# Patient Record
Sex: FEMALE | Race: WHITE | Employment: FULL TIME | URBAN - METROPOLITAN AREA
[De-identification: names, ages, dates, MRNs, and addresses within clinical notes are randomized per-mention and may not be internally consistent; named-entity substitution may affect disease eponyms.]

---

## 2017-08-30 ENCOUNTER — TELEPHONE (OUTPATIENT)
Dept: INTERNAL MEDICINE CLINIC | Facility: CLINIC | Age: 47
End: 2017-08-30

## 2017-08-31 ENCOUNTER — TELEPHONE (OUTPATIENT)
Dept: INTERNAL MEDICINE CLINIC | Facility: CLINIC | Age: 47
End: 2017-08-31

## 2017-08-31 DIAGNOSIS — Z13.220 SCREENING FOR LIPID DISORDERS: ICD-10-CM

## 2017-08-31 DIAGNOSIS — Z13.29 SCREENING FOR THYROID DISORDER: ICD-10-CM

## 2017-08-31 DIAGNOSIS — Z00.00 ROUTINE GENERAL MEDICAL EXAMINATION AT A HEALTH CARE FACILITY: Primary | ICD-10-CM

## 2017-08-31 DIAGNOSIS — Z12.31 ENCOUNTER FOR SCREENING MAMMOGRAM FOR MALIGNANT NEOPLASM OF BREAST: ICD-10-CM

## 2017-08-31 DIAGNOSIS — Z13.228 SCREENING FOR METABOLIC DISORDER: ICD-10-CM

## 2017-08-31 DIAGNOSIS — Z13.0 SCREENING FOR DISORDER OF BLOOD AND BLOOD-FORMING ORGANS: ICD-10-CM

## 2017-08-31 NOTE — TELEPHONE ENCOUNTER
CPE 9/25 please send all orders to [facility] . Patient is aware to fast no cb required.

## 2017-09-05 NOTE — TELEPHONE ENCOUNTER
Future Appointments  Date Time Provider Leora Hansen   9/25/2017 7:30 AM KOFI Steele [facility]

## 2017-09-19 ENCOUNTER — LAB ENCOUNTER (OUTPATIENT)
Dept: LAB | Facility: HOSPITAL | Age: 47
End: 2017-09-19
Attending: NURSE PRACTITIONER
Payer: COMMERCIAL

## 2017-09-19 DIAGNOSIS — Z00.00 ROUTINE GENERAL MEDICAL EXAMINATION AT A HEALTH CARE FACILITY: ICD-10-CM

## 2017-09-19 DIAGNOSIS — Z13.228 SCREENING FOR METABOLIC DISORDER: ICD-10-CM

## 2017-09-19 DIAGNOSIS — Z13.220 SCREENING FOR LIPID DISORDERS: ICD-10-CM

## 2017-09-19 DIAGNOSIS — Z13.29 SCREENING FOR THYROID DISORDER: ICD-10-CM

## 2017-09-19 DIAGNOSIS — Z13.0 SCREENING FOR DISORDER OF BLOOD AND BLOOD-FORMING ORGANS: ICD-10-CM

## 2017-09-19 LAB
ALBUMIN SERPL-MCNC: 3.8 G/DL (ref 3.5–4.8)
ALP LIVER SERPL-CCNC: 90 U/L (ref 39–100)
ALT SERPL-CCNC: 34 U/L (ref 14–54)
AST SERPL-CCNC: 17 U/L (ref 15–41)
BASOPHILS # BLD AUTO: 0.05 X10(3) UL (ref 0–0.1)
BASOPHILS NFR BLD AUTO: 0.7 %
BILIRUB SERPL-MCNC: 0.5 MG/DL (ref 0.1–2)
BUN BLD-MCNC: 15 MG/DL (ref 8–20)
CALCIUM BLD-MCNC: 9.3 MG/DL (ref 8.3–10.3)
CHLORIDE: 105 MMOL/L (ref 101–111)
CHOLEST SMN-MCNC: 186 MG/DL (ref ?–200)
CO2: 28 MMOL/L (ref 22–32)
CREAT BLD-MCNC: 0.71 MG/DL (ref 0.55–1.02)
EOSINOPHIL # BLD AUTO: 0.45 X10(3) UL (ref 0–0.3)
EOSINOPHIL NFR BLD AUTO: 6.3 %
ERYTHROCYTE [DISTWIDTH] IN BLOOD BY AUTOMATED COUNT: 11.5 % (ref 11.5–16)
GLUCOSE BLD-MCNC: 88 MG/DL (ref 70–99)
HCT VFR BLD AUTO: 40.4 % (ref 34–50)
HDLC SERPL-MCNC: 88 MG/DL (ref 45–?)
HDLC SERPL: 2.11 {RATIO} (ref ?–4.44)
HGB BLD-MCNC: 13.8 G/DL (ref 12–16)
IMMATURE GRANULOCYTE COUNT: 0.02 X10(3) UL (ref 0–1)
IMMATURE GRANULOCYTE RATIO %: 0.3 %
LDLC SERPL CALC-MCNC: 84 MG/DL (ref ?–130)
LDLC SERPL-MCNC: 14 MG/DL (ref 5–40)
LYMPHOCYTES # BLD AUTO: 2.05 X10(3) UL (ref 0.9–4)
LYMPHOCYTES NFR BLD AUTO: 28.6 %
M PROTEIN MFR SERPL ELPH: 7.8 G/DL (ref 6.1–8.3)
MCH RBC QN AUTO: 32.3 PG (ref 27–33.2)
MCHC RBC AUTO-ENTMCNC: 34.2 G/DL (ref 31–37)
MCV RBC AUTO: 94.6 FL (ref 81–100)
MONOCYTES # BLD AUTO: 0.42 X10(3) UL (ref 0.1–0.6)
MONOCYTES NFR BLD AUTO: 5.8 %
NEUTROPHIL ABS PRELIM: 4.19 X10 (3) UL (ref 1.3–6.7)
NEUTROPHILS # BLD AUTO: 4.19 X10(3) UL (ref 1.3–6.7)
NEUTROPHILS NFR BLD AUTO: 58.3 %
NONHDLC SERPL-MCNC: 98 MG/DL (ref ?–130)
PLATELET # BLD AUTO: 275 10(3)UL (ref 150–450)
POTASSIUM SERPL-SCNC: 4.2 MMOL/L (ref 3.6–5.1)
RBC # BLD AUTO: 4.27 X10(6)UL (ref 3.8–5.1)
RED CELL DISTRIBUTION WIDTH-SD: 39.1 FL (ref 35.1–46.3)
SODIUM SERPL-SCNC: 140 MMOL/L (ref 136–144)
TRIGLYCERIDES: 70 MG/DL (ref ?–150)
TSI SER-ACNC: 2.39 MIU/ML (ref 0.35–5.5)
WBC # BLD AUTO: 7.2 X10(3) UL (ref 4–13)

## 2017-09-19 PROCEDURE — 84443 ASSAY THYROID STIM HORMONE: CPT

## 2017-09-19 PROCEDURE — 36415 COLL VENOUS BLD VENIPUNCTURE: CPT

## 2017-09-19 PROCEDURE — 85025 COMPLETE CBC W/AUTO DIFF WBC: CPT

## 2017-09-19 PROCEDURE — 80053 COMPREHEN METABOLIC PANEL: CPT

## 2017-09-19 PROCEDURE — 80061 LIPID PANEL: CPT

## 2017-09-25 ENCOUNTER — OFFICE VISIT (OUTPATIENT)
Dept: INTERNAL MEDICINE CLINIC | Facility: CLINIC | Age: 47
End: 2017-09-25

## 2017-09-25 VITALS
TEMPERATURE: 97 F | HEART RATE: 80 BPM | RESPIRATION RATE: 16 BRPM | SYSTOLIC BLOOD PRESSURE: 104 MMHG | DIASTOLIC BLOOD PRESSURE: 60 MMHG | BODY MASS INDEX: 28.93 KG/M2 | HEIGHT: 66 IN | WEIGHT: 180 LBS

## 2017-09-25 DIAGNOSIS — Z00.00 ROUTINE GENERAL MEDICAL EXAMINATION AT A HEALTH CARE FACILITY: Primary | ICD-10-CM

## 2017-09-25 PROCEDURE — 99396 PREV VISIT EST AGE 40-64: CPT | Performed by: NURSE PRACTITIONER

## 2017-09-25 NOTE — PROGRESS NOTES
Geovanna Thorne is a 52year old female who presents for a complete physical exam:       Patient complains of:  Doing well   No complaints.      Wt Readings from Last 6 Encounters:  05/19/16 : 193 lb  10/26/15 : 193 lb 7 oz  10/05/15 : 192 lb 8 oz  09/14/15 Maintenance  Immunizations: flu will be done at work    There is no immunization history on file for this patient.   Dental Visits: yes  Colonoscopy: due 2021  Pap: Dr Hagan      History of dysplasia:  No  Menstrual Cycle: regular         LMP: 8/20  [facility] visit.

## 2017-10-07 ENCOUNTER — HOSPITAL ENCOUNTER (OUTPATIENT)
Dept: MAMMOGRAPHY | Facility: HOSPITAL | Age: 47
Discharge: HOME | End: 2017-10-07
Attending: NURSE PRACTITIONER
Payer: COMMERCIAL

## 2017-10-07 DIAGNOSIS — Z12.31 ENCOUNTER FOR SCREENING MAMMOGRAM FOR MALIGNANT NEOPLASM OF BREAST: ICD-10-CM

## 2017-10-07 DIAGNOSIS — Z00.00 ROUTINE GENERAL MEDICAL EXAMINATION AT A HEALTH CARE FACILITY: ICD-10-CM

## 2017-10-07 PROCEDURE — 77067 SCR MAMMO BI INCL CAD: CPT | Performed by: NURSE PRACTITIONER

## 2018-01-08 ENCOUNTER — HOSPITAL ENCOUNTER (OUTPATIENT)
Dept: CT IMAGING | Facility: HOSPITAL | Age: 48
Discharge: HOME | End: 2018-01-08
Attending: INTERNAL MEDICINE

## 2018-01-08 DIAGNOSIS — Z13.6 SCREENING FOR HEART DISEASE: ICD-10-CM

## 2018-10-15 ENCOUNTER — TELEPHONE (OUTPATIENT)
Dept: INTERNAL MEDICINE CLINIC | Facility: CLINIC | Age: 48
End: 2018-10-15

## 2018-10-15 DIAGNOSIS — Z00.00 ROUTINE GENERAL MEDICAL EXAMINATION AT A HEALTH CARE FACILITY: Primary | ICD-10-CM

## 2018-10-15 NOTE — TELEPHONE ENCOUNTER
Blood work orders for cpe   Future Appointments   Date Time Provider Leora Hansen   12/17/2018  1:00 PM Isa Koenig MD [facility]     Lab is edward.  Pt aware to fast

## 2018-12-10 ENCOUNTER — TELEPHONE (OUTPATIENT)
Dept: INTERNAL MEDICINE CLINIC | Facility: CLINIC | Age: 48
End: 2018-12-10

## 2018-12-11 ENCOUNTER — LAB ENCOUNTER (OUTPATIENT)
Dept: LAB | Facility: HOSPITAL | Age: 48
End: 2018-12-11
Attending: INTERNAL MEDICINE
Payer: COMMERCIAL

## 2018-12-11 DIAGNOSIS — Z00.00 ROUTINE GENERAL MEDICAL EXAMINATION AT A HEALTH CARE FACILITY: ICD-10-CM

## 2018-12-11 PROCEDURE — 36415 COLL VENOUS BLD VENIPUNCTURE: CPT

## 2018-12-11 PROCEDURE — 80061 LIPID PANEL: CPT

## 2018-12-11 PROCEDURE — 80053 COMPREHEN METABOLIC PANEL: CPT

## 2018-12-11 PROCEDURE — 84443 ASSAY THYROID STIM HORMONE: CPT

## 2018-12-11 PROCEDURE — 85025 COMPLETE CBC W/AUTO DIFF WBC: CPT

## 2018-12-17 ENCOUNTER — LABORATORY ENCOUNTER (OUTPATIENT)
Dept: LAB | Age: 48
End: 2018-12-17
Attending: INTERNAL MEDICINE
Payer: COMMERCIAL

## 2018-12-17 ENCOUNTER — OFFICE VISIT (OUTPATIENT)
Dept: INTERNAL MEDICINE CLINIC | Facility: CLINIC | Age: 48
End: 2018-12-17
Payer: COMMERCIAL

## 2018-12-17 VITALS
DIASTOLIC BLOOD PRESSURE: 84 MMHG | RESPIRATION RATE: 16 BRPM | HEART RATE: 76 BPM | SYSTOLIC BLOOD PRESSURE: 130 MMHG | HEIGHT: 65.5 IN | WEIGHT: 199.38 LBS | TEMPERATURE: 98 F | BODY MASS INDEX: 32.82 KG/M2

## 2018-12-17 DIAGNOSIS — R74.8 ELEVATED ALKALINE PHOSPHATASE LEVEL: ICD-10-CM

## 2018-12-17 DIAGNOSIS — Z00.00 PE (PHYSICAL EXAM), ANNUAL: Primary | ICD-10-CM

## 2018-12-17 DIAGNOSIS — Z12.31 VISIT FOR SCREENING MAMMOGRAM: ICD-10-CM

## 2018-12-17 DIAGNOSIS — R10.11 RUQ ABDOMINAL PAIN: ICD-10-CM

## 2018-12-17 PROCEDURE — 84080 ASSAY ALKALINE PHOSPHATASES: CPT

## 2018-12-17 PROCEDURE — 84075 ASSAY ALKALINE PHOSPHATASE: CPT

## 2018-12-17 PROCEDURE — 99213 OFFICE O/P EST LOW 20 MIN: CPT | Performed by: INTERNAL MEDICINE

## 2018-12-17 PROCEDURE — 99396 PREV VISIT EST AGE 40-64: CPT | Performed by: INTERNAL MEDICINE

## 2018-12-17 NOTE — PROGRESS NOTES
Patient presents with:  Physical: cn room 9: physical , pressure on the same side of gallbaldder surgery, same Hpylori symptoms as before      HPI:  Here for cpe. Pt has ruq abd discommfort more at night, since her cholecystectomy.  Pt also notes gerd lik Smoking status: Never Smoker      Smokeless tobacco: Never Used    Alcohol use: No      Alcohol/week: 0.0 oz    Drug use: No        Current Outpatient Medications:  Multiple Vitamins-Minerals (MULTI-VITAMIN/MINERALS) Oral Tab Take 1 tablet by mouth daily. for elevated alk phos, likely fatty liver due to increased wt and chol. Labs reviewed. Advised low fat and low cholesterol diet. Exercise 3-4 times weekly.      Orders Placed This Encounter      Alk Phos Isoenzyme [E]      Meds & Refills for this Visit:

## 2018-12-23 NOTE — PROGRESS NOTES
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 Mom    REVIEW OF SYSTEMS  GENERAL: feels well otherwise  SKIN: denies any rashes to skin folds  HEENT: spouse reports snoring  LUNGS: denies shortness of breath with exertion, no apnea  CARDIOVASCULAR: denies chest pain on exertion, denies palpitations or 12/11/2018    BILT 0.5 12/11/2018    TP 7.3 12/11/2018    ALB 3.5 12/11/2018    GLOBULT 2.8 09/14/2015    GLOBULIN 3.8 12/11/2018    ALBGLOBRAT 1.5 09/14/2015     12/11/2018    K 4.1 12/11/2018     12/11/2018    CO2 28.0 12/11/2018     No resul contraindications: none  · Follow up with dietitian and psychologist as recommended. · Discussed the role of sleep and stress in weight management. · Labs orders as above.   · Counseled on comprehensive weight loss plan including attention to nutrition, e eating the right amount of calories or too much or too little which would hinder weight loss. Additionally this will help to see your daily carbohydrate intake.  When you set the neyda up chose 1-2 lbs/week as a goal.  Keeping a paper food journal is an optio

## 2018-12-26 ENCOUNTER — OFFICE VISIT (OUTPATIENT)
Dept: INTERNAL MEDICINE CLINIC | Facility: CLINIC | Age: 48
End: 2018-12-26
Payer: COMMERCIAL

## 2018-12-26 VITALS
RESPIRATION RATE: 16 BRPM | HEART RATE: 78 BPM | SYSTOLIC BLOOD PRESSURE: 110 MMHG | HEIGHT: 65.5 IN | BODY MASS INDEX: 33.42 KG/M2 | WEIGHT: 203 LBS | DIASTOLIC BLOOD PRESSURE: 70 MMHG

## 2018-12-26 DIAGNOSIS — E66.9 OBESITY (BMI 30.0-34.9): ICD-10-CM

## 2018-12-26 DIAGNOSIS — Z51.81 ENCOUNTER FOR THERAPEUTIC DRUG MONITORING: Primary | ICD-10-CM

## 2018-12-26 PROBLEM — E66.811 OBESITY (BMI 30.0-34.9): Status: ACTIVE | Noted: 2018-12-26

## 2018-12-26 PROCEDURE — 93000 ELECTROCARDIOGRAM COMPLETE: CPT | Performed by: NURSE PRACTITIONER

## 2018-12-26 PROCEDURE — 99214 OFFICE O/P EST MOD 30 MIN: CPT | Performed by: NURSE PRACTITIONER

## 2018-12-26 RX ORDER — PHENTERMINE HYDROCHLORIDE 37.5 MG/1
37.5 TABLET ORAL
Qty: 30 TABLET | Refills: 0 | Status: SHIPPED | OUTPATIENT
Start: 2018-12-26 | End: 2019-01-17

## 2018-12-26 RX ORDER — OMEPRAZOLE 20 MG/1
20 CAPSULE, DELAYED RELEASE ORAL
COMMUNITY
End: 2019-06-11

## 2018-12-26 NOTE — PATIENT INSTRUCTIONS
Welcome to the [facility]...your Lifestyle Renovation begins now! Thank you for placing your trust in our health care team, I look forward to working with you along this journey to better health!     Next steps:     1.  Sched begin with 1 day and progress as able with long-term goal of 30 minutes 5 days a week at a minimum. Meditation daily can help manage and control stress. Chronic stress can make weight loss difficult.   Exercising is one way to help with stress, but medit

## 2019-01-03 ENCOUNTER — LAB ENCOUNTER (OUTPATIENT)
Dept: LAB | Facility: HOSPITAL | Age: 49
End: 2019-01-03
Attending: NURSE PRACTITIONER
Payer: COMMERCIAL

## 2019-01-03 DIAGNOSIS — E66.9 OBESITY (BMI 30.0-34.9): ICD-10-CM

## 2019-01-03 DIAGNOSIS — Z51.81 ENCOUNTER FOR THERAPEUTIC DRUG MONITORING: ICD-10-CM

## 2019-01-03 LAB
EST. AVERAGE GLUCOSE BLD GHB EST-MCNC: 108 MG/DL (ref 68–126)
HAV AB SERPL IA-ACNC: 681 PG/ML (ref 193–986)
HBA1C MFR BLD HPLC: 5.4 % (ref ?–5.7)
VIT D+METAB SERPL-MCNC: 28.8 NG/ML (ref 30–100)

## 2019-01-03 PROCEDURE — 82306 VITAMIN D 25 HYDROXY: CPT

## 2019-01-03 PROCEDURE — 82397 CHEMILUMINESCENT ASSAY: CPT

## 2019-01-03 PROCEDURE — 36415 COLL VENOUS BLD VENIPUNCTURE: CPT

## 2019-01-03 PROCEDURE — 83036 HEMOGLOBIN GLYCOSYLATED A1C: CPT

## 2019-01-03 PROCEDURE — 82607 VITAMIN B-12: CPT

## 2019-01-07 ENCOUNTER — HOSPITAL ENCOUNTER (OUTPATIENT)
Dept: ULTRASOUND IMAGING | Facility: HOSPITAL | Age: 49
Discharge: HOME | End: 2019-01-07
Attending: INTERNAL MEDICINE
Payer: COMMERCIAL

## 2019-01-07 DIAGNOSIS — R10.11 RUQ ABDOMINAL PAIN: ICD-10-CM

## 2019-01-07 LAB — LEPTIN: 16.4 NG/ML

## 2019-01-07 PROCEDURE — 76700 US EXAM ABDOM COMPLETE: CPT | Performed by: INTERNAL MEDICINE

## 2019-01-17 ENCOUNTER — OFFICE VISIT (OUTPATIENT)
Dept: INTERNAL MEDICINE CLINIC | Facility: CLINIC | Age: 49
End: 2019-01-17
Payer: COMMERCIAL

## 2019-01-17 VITALS
HEART RATE: 100 BPM | DIASTOLIC BLOOD PRESSURE: 80 MMHG | SYSTOLIC BLOOD PRESSURE: 110 MMHG | BODY MASS INDEX: 31.93 KG/M2 | HEIGHT: 65.5 IN | WEIGHT: 194 LBS | RESPIRATION RATE: 14 BRPM

## 2019-01-17 DIAGNOSIS — E66.9 OBESITY (BMI 30.0-34.9): ICD-10-CM

## 2019-01-17 DIAGNOSIS — Z51.81 ENCOUNTER FOR THERAPEUTIC DRUG MONITORING: Primary | ICD-10-CM

## 2019-01-17 DIAGNOSIS — K59.03 DRUG INDUCED CONSTIPATION: ICD-10-CM

## 2019-01-17 DIAGNOSIS — E55.9 VITAMIN D DEFICIENCY: ICD-10-CM

## 2019-01-17 PROCEDURE — 99214 OFFICE O/P EST MOD 30 MIN: CPT | Performed by: NURSE PRACTITIONER

## 2019-01-17 RX ORDER — LACTOBACIL 2/BIFIDO 1/S.THERMO 450B CELL
1 PACKET (EA) ORAL DAILY
Qty: 60 CAPSULE | Refills: 3 | COMMUNITY
Start: 2019-01-17 | End: 2019-06-11

## 2019-01-17 RX ORDER — ERGOCALCIFEROL 1.25 MG/1
50000 CAPSULE ORAL WEEKLY
Qty: 12 CAPSULE | Refills: 1 | Status: SHIPPED | OUTPATIENT
Start: 2019-01-17 | End: 2019-11-19

## 2019-01-17 RX ORDER — PHENTERMINE HYDROCHLORIDE 37.5 MG/1
37.5 TABLET ORAL
Qty: 30 TABLET | Refills: 0 | Status: SHIPPED | OUTPATIENT
Start: 2019-01-17 | End: 2019-03-18

## 2019-01-17 NOTE — PATIENT INSTRUCTIONS
Congratulations on your 9# weight loss! Continue making lifestyle changes that focus on good nutrition, regular exercise and stress management.     Today we reviewed your labs with findings of: Vitamin D deficiency, mild leptin elevation    Medication Plan: Although daily physical activity, like walking, swimming and aerobics are essential to good heart health and weight management, combining muscle building weight training with cardiovascular exercise, gives you an unbeatable combination to lose fat and keep One of the best ways to help treat constipation is to increase your fiber intake. You can do this either through diet or by using fiber supplements. Fiber (in whole grains, fruits, and vegetables) adds bulk and absorbs water to soften the stool.  This helps

## 2019-01-17 NOTE — PROGRESS NOTES
Quinton Euceda is a 50year old female presents today for 1 month follow-up on medical weight loss program for the treatment of overweight, obesity, or morbid obesity with associated Vitamin D deficiency, mild leptin elevation.     S:  Current weight Wt Re PSYCH: pleasant, cooperative, normal mood and affect    ASSESSMENT AND PLAN:  Encounter for therapeutic drug monitoring  (primary encounter diagnosis)  Obesity (bmi 30.0-34. 9)  Vitamin d deficiency  Drug induced constipation    No orders of the defined typ Agreed upon goal/s before next office visit include: Romy Matthews work! Keep up the variety of fitness with cardio and strength.     Build Muscle & Lose Fat  The great fat vs muscle diagram below paints a clear picture of why it’s so important for you to build mus 2. Improve appearance. When done properly, strength training can greatly improve your posture and help to prevent joint pain. 3. Build confidence.  Strong muscles and joints increase your level of confidence in your abilities to perform many lifestyle acti · Nuts and legumes (especially beans such as lentils, kidney beans, and lima beans)  Get physically active  Exercise helps improve the working of your colon which helps ease constipation. Try to get some physical activity every day.  If you haven’t been act

## 2019-01-28 ENCOUNTER — HOSPITAL ENCOUNTER (OUTPATIENT)
Dept: MAMMOGRAPHY | Facility: HOSPITAL | Age: 49
Discharge: HOME | End: 2019-01-28
Attending: INTERNAL MEDICINE
Payer: COMMERCIAL

## 2019-01-28 DIAGNOSIS — Z12.31 VISIT FOR SCREENING MAMMOGRAM: ICD-10-CM

## 2019-01-28 PROCEDURE — 77063 BREAST TOMOSYNTHESIS BI: CPT | Performed by: INTERNAL MEDICINE

## 2019-01-28 PROCEDURE — 77067 SCR MAMMO BI INCL CAD: CPT | Performed by: INTERNAL MEDICINE

## 2019-03-18 ENCOUNTER — OFFICE VISIT (OUTPATIENT)
Dept: INTERNAL MEDICINE CLINIC | Facility: CLINIC | Age: 49
End: 2019-03-18
Payer: COMMERCIAL

## 2019-03-18 VITALS
SYSTOLIC BLOOD PRESSURE: 130 MMHG | DIASTOLIC BLOOD PRESSURE: 86 MMHG | WEIGHT: 188.19 LBS | BODY MASS INDEX: 30.98 KG/M2 | RESPIRATION RATE: 16 BRPM | HEART RATE: 88 BPM | HEIGHT: 65.5 IN

## 2019-03-18 DIAGNOSIS — Z51.81 ENCOUNTER FOR THERAPEUTIC DRUG MONITORING: Primary | ICD-10-CM

## 2019-03-18 DIAGNOSIS — E66.9 OBESITY (BMI 30.0-34.9): ICD-10-CM

## 2019-03-18 PROCEDURE — 99213 OFFICE O/P EST LOW 20 MIN: CPT | Performed by: NURSE PRACTITIONER

## 2019-03-18 RX ORDER — PHENTERMINE HYDROCHLORIDE 37.5 MG/1
37.5 TABLET ORAL DAILY
Qty: 30 TABLET | Refills: 0 | Status: SHIPPED | OUTPATIENT
Start: 2019-03-18 | End: 2019-05-16

## 2019-03-18 NOTE — PROGRESS NOTES
Valeria Valadez is a 50year old female presents today for 3 month follow-up on medical weight loss program for the treatment of overweight, obesity, or morbid obesity with associated Vitamin D deficiency, mild leptin elevation.     S:  Current weight Wt Re PLAN:  Encounter for therapeutic drug monitoring  (primary encounter diagnosis)  Obesity (bmi 30.0-34.9)    No orders of the defined types were placed in this encounter.       Meds & Refills for this Visit:  Requested Prescriptions     Signed Prescriptions surprised at how fast these little changes can make a difference. Some people really cannot walk very far, and tire out quickly with exercise.  Instead of becoming discouraged, resolve to do what you can do, and work to make that a regular frequent habit.

## 2019-03-18 NOTE — PATIENT INSTRUCTIONS
Continue making lifestyle changes that focus on good nutrition, regular exercise and stress management. Medication Plan: Continue current medication regimen. Agreed upon goal/s before next office visit include: Lai Burch work!     Weight Management: Exerc Have a  help you develop a plan that’s safe for you. Date Last Reviewed: 2/4/2016  © 3020-2208 [employer]. [address] [facility] [place], [address] [place] [address]. All rights reserved.  This information is not intended as a substitute

## 2019-04-09 ENCOUNTER — OFFICE VISIT (OUTPATIENT)
Dept: INTERNAL MEDICINE CLINIC | Facility: CLINIC | Age: 49
End: 2019-04-09
Payer: COMMERCIAL

## 2019-04-09 VITALS — BODY MASS INDEX: 31.11 KG/M2 | HEIGHT: 65.5 IN | WEIGHT: 189 LBS

## 2019-04-09 DIAGNOSIS — E66.09 CLASS 1 OBESITY DUE TO EXCESS CALORIES WITHOUT SERIOUS COMORBIDITY WITH BODY MASS INDEX (BMI) OF 30.0 TO 30.9 IN ADULT: Primary | ICD-10-CM

## 2019-04-09 PROCEDURE — 97802 MEDICAL NUTRITION INDIV IN: CPT | Performed by: DIETITIAN, REGISTERED

## 2019-04-10 NOTE — PROGRESS NOTES
INITIAL OUTPATIENT NUTRITION CONSULTATION    Nutrition Assessment    Medical Diagnosis: Obesity    Physical Findings: n/a    Client Age and Gender: 50year old female    Marital Status and Occupation: , OB Tech at [employer]:    Current O cholesterol >60 mg/dL is a negative risk factor for coronary heart disease.          HDL CHOLESTEROL   Date Value Ref Range Status   09/14/2015 63 > OR = 46 mg/dL Final     AST   Date Value Ref Range Status   12/11/2018 26 15 - 41 U/L Final   09/14/2015 19 Encouraged pt to try taking phentermine later in the morning + include a high protein snack in between lunch and dinner to prevent feelings of intense hunger around dinner time.  Pt also looking to restart exercise routine- she used to go to the [facility] 2-3x p

## 2019-05-16 ENCOUNTER — OFFICE VISIT (OUTPATIENT)
Dept: INTERNAL MEDICINE CLINIC | Facility: CLINIC | Age: 49
End: 2019-05-16
Payer: COMMERCIAL

## 2019-05-16 VITALS
BODY MASS INDEX: 30.62 KG/M2 | DIASTOLIC BLOOD PRESSURE: 70 MMHG | HEIGHT: 65.5 IN | RESPIRATION RATE: 14 BRPM | HEART RATE: 70 BPM | WEIGHT: 186 LBS | SYSTOLIC BLOOD PRESSURE: 110 MMHG

## 2019-05-16 DIAGNOSIS — E66.9 OBESITY (BMI 30.0-34.9): ICD-10-CM

## 2019-05-16 DIAGNOSIS — Z51.81 ENCOUNTER FOR THERAPEUTIC DRUG MONITORING: Primary | ICD-10-CM

## 2019-05-16 DIAGNOSIS — E55.9 VITAMIN D DEFICIENCY: ICD-10-CM

## 2019-05-16 PROCEDURE — 99214 OFFICE O/P EST MOD 30 MIN: CPT | Performed by: NURSE PRACTITIONER

## 2019-05-16 RX ORDER — PHENTERMINE HYDROCHLORIDE 37.5 MG/1
37.5 TABLET ORAL DAILY
Qty: 30 TABLET | Refills: 0 | Status: SHIPPED | OUTPATIENT
Start: 2019-05-16 | End: 2019-06-11

## 2019-05-16 RX ORDER — TOPIRAMATE 25 MG/1
25 TABLET ORAL 2 TIMES DAILY
Qty: 60 TABLET | Refills: 1 | Status: SHIPPED | OUTPATIENT
Start: 2019-05-16 | End: 2019-06-11

## 2019-05-16 NOTE — PATIENT INSTRUCTIONS
Continue making lifestyle changes that focus on good nutrition, regular exercise and stress management. Medication Plan: Continue current medication regimen, Start Topamax at 1 tab daily for 7 days, then increase to 1 tab twice a day as prescribed.     A afterwards. 3. Manage your stress. Healthy emotional distress management is an important life skill. Positive ways to reduce stress include regular exercise, relaxation techniques and getting support from family and friends. 4. Be physically active.  Exer Living. Taken from [email or web address] com

## 2019-05-16 NOTE — PROGRESS NOTES
Marquis Reardon is covered by your patient's prescription insurance plan  Based on the information provided, your patient can expect to pay:  $75

## 2019-05-16 NOTE — PROGRESS NOTES
Prashant Orozco is a 50year old female presents today for 5 month follow-up on medical weight loss program for the treatment of overweight, obesity, or morbid obesity with associated Vitamin D deficiency, mild leptin elevation.     S:  Current weight Wt Re grossly intact  PSYCH: pleasant, cooperative, normal mood and affect    ASSESSMENT AND PLAN:  Encounter for therapeutic drug monitoring  (primary encounter diagnosis)  Obesity (bmi 30.0-34. 9)  Vitamin d deficiency    No orders of the defined types were boogie appetite anyway. And whether or not Keith Hightowerling been trying to use emotional eating to soothe feelings of stress, depression, loneliness, frustration or boredom, in the long run, overeating to feed those feelings only makes them worse.   But learning how to sto grains and fresh fruits, plus healthy high protein foods, like fish, lean poultry and low-fat dairy. 7. Eat mini-meals often. By eating 5 or 6 small healthy meals a day, including breakfast, you help keep your blood sugar and moods stable.   8. Get rid of

## 2019-06-11 ENCOUNTER — OFFICE VISIT (OUTPATIENT)
Dept: INTERNAL MEDICINE CLINIC | Facility: CLINIC | Age: 49
End: 2019-06-11
Payer: COMMERCIAL

## 2019-06-11 VITALS
DIASTOLIC BLOOD PRESSURE: 80 MMHG | HEART RATE: 70 BPM | WEIGHT: 184 LBS | SYSTOLIC BLOOD PRESSURE: 120 MMHG | HEIGHT: 65.5 IN | RESPIRATION RATE: 14 BRPM | BODY MASS INDEX: 30.29 KG/M2

## 2019-06-11 DIAGNOSIS — Z51.81 ENCOUNTER FOR THERAPEUTIC DRUG MONITORING: Primary | ICD-10-CM

## 2019-06-11 DIAGNOSIS — E66.9 OBESITY (BMI 30.0-34.9): ICD-10-CM

## 2019-06-11 PROCEDURE — 99213 OFFICE O/P EST LOW 20 MIN: CPT | Performed by: NURSE PRACTITIONER

## 2019-06-11 RX ORDER — PEN NEEDLE, DIABETIC 30 GX3/16"
1 NEEDLE, DISPOSABLE MISCELLANEOUS DAILY
Qty: 100 EACH | Refills: 1 | Status: SHIPPED | OUTPATIENT
Start: 2019-06-11 | End: 2019-10-28

## 2019-06-11 RX ORDER — PHENTERMINE HYDROCHLORIDE 37.5 MG/1
37.5 TABLET ORAL DAILY
Qty: 30 TABLET | Refills: 0 | Status: SHIPPED | OUTPATIENT
Start: 2019-06-11 | End: 2019-07-16

## 2019-06-11 RX ORDER — LIRAGLUTIDE 6 MG/ML
3 INJECTION, SOLUTION SUBCUTANEOUS DAILY
Qty: 5 PEN | Refills: 1 | Status: SHIPPED | OUTPATIENT
Start: 2019-06-11 | End: 2019-07-16

## 2019-06-11 NOTE — PROGRESS NOTES
Quinton Euceda is a 50year old female presents today for 6 month follow-up on medical weight loss program for the treatment of overweight, obesity, or morbid obesity with associated Vitamin D deficiency, mild leptin elevation.     S:  Current weight Wt Re mood and affect    ASSESSMENT AND PLAN:  Encounter for therapeutic drug monitoring  (primary encounter diagnosis)  Obesity (bmi 30.0-34.9)    No orders of the defined types were placed in this encounter.       Meds & Refills for this Visit:  Requested Presc weight.     Perimenopause/Menopause and Obesity    The prevalence of obesity, which is closely associated with cardiovascular risk, increases significantly in American women after they reach age 36; the prevalence reaches 65% between 36 and 61 years, and 68 with premenopausal and postmenopausal women. The low estrogen levels in the late stages of menopause may also impair the function of leptin and neuropeptide Y, hormones that control fullness and appetite.  Therefore, women in the late stages of perimenop maintain lean muscle. The American Heart Association recommends 150 minutes of moderate exercise per week. Add ANY activity to your day. Strength and resistance training help maintain bone mass.  This will help to prevent osteoporosis, which is bone loss th

## 2019-06-11 NOTE — PATIENT INSTRUCTIONS
Continue making lifestyle changes that focus on good nutrition, regular exercise and stress management. Medication Plan: Continue current medication regimen, remain off Topamax.  Add Saxenda daily as directed and tolerated:  Start JÄRVENPÄÄ daily as direct overall increase in total body fat but now more so in your midsection. Studies have consistently shown that this waistline increase is different from when you were younger.  An increase in visceral (abdominal) fat is linked to an increase in insulin resista that we’ve got to move more and eat less to keep our healthy weight. To help decrease portion sizes, try splitting your meals with a friend, ordering the lighter portion when available, or put half in the takeout box right away.  Swap out dessert for fruit

## 2019-06-25 ENCOUNTER — TELEPHONE (OUTPATIENT)
Dept: INTERNAL MEDICINE CLINIC | Facility: CLINIC | Age: 49
End: 2019-06-25

## 2019-07-16 DIAGNOSIS — Z51.81 ENCOUNTER FOR THERAPEUTIC DRUG MONITORING: ICD-10-CM

## 2019-07-16 DIAGNOSIS — E66.9 OBESITY (BMI 30.0-34.9): ICD-10-CM

## 2019-07-16 NOTE — TELEPHONE ENCOUNTER
Requesting   Requested Prescriptions     Pending Prescriptions Disp Refills   • Phentermine HCl 37.5 MG Oral Tab 30 tablet 0     Sig: Take 1 tablet (37.5 mg total) by mouth daily.    • SAXENDA 18 MG/3ML Subcutaneous Solution Pen-injector 5 pen 1     Sig: In

## 2019-07-18 RX ORDER — PHENTERMINE HYDROCHLORIDE 37.5 MG/1
37.5 TABLET ORAL DAILY
Qty: 30 TABLET | Refills: 0 | OUTPATIENT
Start: 2019-07-18 | End: 2019-08-15

## 2019-07-18 RX ORDER — LIRAGLUTIDE 6 MG/ML
3 INJECTION, SOLUTION SUBCUTANEOUS DAILY
Qty: 5 PEN | Refills: 1 | Status: SHIPPED | OUTPATIENT
Start: 2019-07-18 | End: 2019-08-15

## 2019-08-15 ENCOUNTER — OFFICE VISIT (OUTPATIENT)
Dept: INTERNAL MEDICINE CLINIC | Facility: CLINIC | Age: 49
End: 2019-08-15
Payer: COMMERCIAL

## 2019-08-15 VITALS
BODY MASS INDEX: 28.31 KG/M2 | WEIGHT: 172 LBS | SYSTOLIC BLOOD PRESSURE: 110 MMHG | DIASTOLIC BLOOD PRESSURE: 80 MMHG | HEART RATE: 70 BPM | RESPIRATION RATE: 14 BRPM | HEIGHT: 65.5 IN

## 2019-08-15 DIAGNOSIS — E66.9 OBESITY (BMI 30.0-34.9): ICD-10-CM

## 2019-08-15 DIAGNOSIS — Z51.81 ENCOUNTER FOR THERAPEUTIC DRUG MONITORING: Primary | ICD-10-CM

## 2019-08-15 PROCEDURE — 99213 OFFICE O/P EST LOW 20 MIN: CPT | Performed by: NURSE PRACTITIONER

## 2019-08-15 RX ORDER — PHENTERMINE HYDROCHLORIDE 37.5 MG/1
37.5 TABLET ORAL DAILY
Qty: 30 TABLET | Refills: 2 | Status: SHIPPED | OUTPATIENT
Start: 2019-08-15 | End: 2019-10-28

## 2019-08-15 RX ORDER — LIRAGLUTIDE 6 MG/ML
3 INJECTION, SOLUTION SUBCUTANEOUS DAILY
Qty: 5 PEN | Refills: 5 | Status: SHIPPED | OUTPATIENT
Start: 2019-08-15 | End: 2019-10-28

## 2019-08-15 NOTE — PROGRESS NOTES
Rema Medina is a 52year old female presents today for 8 month follow-up on medical weight loss program for the treatment of overweight, obesity, or morbid obesity with associated Vitamin D deficiency, mild leptin elevation.     S:  Current weight Wt Re PLAN:  Encounter for therapeutic drug monitoring  (primary encounter diagnosis)  Obesity (bmi 30.0-34.9)    No orders of the defined types were placed in this encounter.       Meds & Refills for this Visit:  Requested Prescriptions     Signed Prescriptions culturally-defined standards for weight and body size can definitely influence an individual’s decision to lose weight, we may be missing one very important factor that should never go overlooked!   Along the journey with weight and health, it’s also import conditions. Fortunately, there is a strong relationship between weight-loss and risk factor improvement.  Every pound lost reduces our risk of:  • Diabetes   • Heart disease and stroke   • Osteoarthritis   • High blood pressure   • Breathing problems (such use and SEs reviewed with patient. Return in about 3 months (around 11/15/2019) for weight mangement. Patient verbalizes understanding.

## 2019-08-15 NOTE — PATIENT INSTRUCTIONS
Continue making lifestyle changes that focus on good nutrition, regular exercise and stress management. Medication Plan: Continue current medication regimen. Agreed upon goal/s before next office visit include: Great work with lifestyle changes!  Now weight gain such as insulin, oral medications for diabetes, antidepressants and more   • Metabolic adaptation – Changes in our metabolism and physiology can affect our energy needs that influence weight-loss   • Environmental drivers – Our environment can going into your body and how much. Are you controlling your portions? Eating foods that are less-processed and rich with nutrients? • Establish healthy habits – Simple lifestyle changes can do a lot for your body.  Try developing an exercise routine, or e

## 2019-09-24 DIAGNOSIS — E55.9 VITAMIN D DEFICIENCY: ICD-10-CM

## 2019-09-24 RX ORDER — ERGOCALCIFEROL 1.25 MG/1
CAPSULE ORAL
Qty: 12 CAPSULE | Refills: 1 | OUTPATIENT
Start: 2019-09-24

## 2019-09-24 NOTE — TELEPHONE ENCOUNTER
Requesting Vitamin D  LOV: 8/15/19  RTC: 3 months  Last Relevant Labs: 1/3/19  Filled: 1/17/19 #12 with 1 refills    No future appointments.     Should be done with RX now

## 2019-10-10 ENCOUNTER — TELEPHONE (OUTPATIENT)
Dept: INTERNAL MEDICINE CLINIC | Facility: CLINIC | Age: 49
End: 2019-10-10

## 2019-10-10 DIAGNOSIS — Z00.00 ROUTINE GENERAL MEDICAL EXAMINATION AT A HEALTH CARE FACILITY: Primary | ICD-10-CM

## 2019-10-10 DIAGNOSIS — Z13.228 SCREENING FOR METABOLIC DISORDER: ICD-10-CM

## 2019-10-10 DIAGNOSIS — Z13.220 SCREENING FOR LIPID DISORDERS: ICD-10-CM

## 2019-10-10 DIAGNOSIS — Z13.0 SCREENING FOR BLOOD DISEASE: ICD-10-CM

## 2019-10-10 DIAGNOSIS — Z13.29 SCREENING FOR THYROID DISORDER: ICD-10-CM

## 2019-10-10 NOTE — TELEPHONE ENCOUNTER
CPE   Future Appointments   Date Time Provider Leora Mittali   12/20/2019 11:00 AM Rafiq Watson MD [facility]     Orders to  Gemini Hernandez aware must fast no call back required

## 2019-10-28 ENCOUNTER — OFFICE VISIT (OUTPATIENT)
Dept: OBGYN CLINIC | Facility: CLINIC | Age: 49
End: 2019-10-28
Payer: COMMERCIAL

## 2019-10-28 VITALS
BODY MASS INDEX: 28.09 KG/M2 | SYSTOLIC BLOOD PRESSURE: 112 MMHG | HEART RATE: 112 BPM | WEIGHT: 170.63 LBS | HEIGHT: 65.5 IN | DIASTOLIC BLOOD PRESSURE: 80 MMHG

## 2019-10-28 DIAGNOSIS — Z01.419 ENCOUNTER FOR WELL WOMAN EXAM WITH ROUTINE GYNECOLOGICAL EXAM: Primary | ICD-10-CM

## 2019-10-28 DIAGNOSIS — Z12.4 CERVICAL CANCER SCREENING: ICD-10-CM

## 2019-10-28 PROCEDURE — 99396 PREV VISIT EST AGE 40-64: CPT | Performed by: OBSTETRICS & GYNECOLOGY

## 2019-10-28 PROCEDURE — 88175 CYTOPATH C/V AUTO FLUID REDO: CPT | Performed by: OBSTETRICS & GYNECOLOGY

## 2019-10-28 PROCEDURE — 87624 HPV HI-RISK TYP POOLED RSLT: CPT | Performed by: OBSTETRICS & GYNECOLOGY

## 2019-10-28 NOTE — PROGRESS NOTES
Rema Medina is a 52year old female  No LMP recorded. (Menstrual status: IUD - Intrauterine Device). No chief complaint on file.   .Patient started having hot flashes, perimenopausal, will leave Mirena IUD for another year and then remove      OBSTE file        Attends meetings of clubs or organizations: Not on file        Relationship status: Not on file      Intimate partner violence:        Fear of current or ex partner: Not on file        Emotionally abused: Not on file        Physically abused: N any breast pain, lumps, or discharge. Neurological:  denies headaches, extremity weakness or numbness. Psychiatric: denies depression or anxiety. Endocrine:   denies excessive thirst or urination.   Heme/Lymph:  denies history of anemia, easy bruising o

## 2019-11-04 ENCOUNTER — TELEPHONE (OUTPATIENT)
Dept: INTERNAL MEDICINE CLINIC | Facility: CLINIC | Age: 49
End: 2019-11-04

## 2019-11-05 NOTE — TELEPHONE ENCOUNTER
Requesting Phentermine  LOV: 8/15/19  RTC: 3 months  Last Relevant Labs: na  Filled: 8/15/19 #30 with 2 refills    Future Appointments   Date Time Provider Leora Hansen   11/19/2019  1:00 PM GERRY Vines [facility]   12/11/2019  9:30 AM Sukhjinder Weiner MD [address]   12/20/2019 11:00 AM Isacc Baxter MD [facility]     Patient should have a refill left to fill.   My chart sent

## 2019-11-19 ENCOUNTER — OFFICE VISIT (OUTPATIENT)
Dept: INTERNAL MEDICINE CLINIC | Facility: CLINIC | Age: 49
End: 2019-11-19
Payer: COMMERCIAL

## 2019-11-19 VITALS
DIASTOLIC BLOOD PRESSURE: 70 MMHG | BODY MASS INDEX: 27.82 KG/M2 | HEIGHT: 65.5 IN | WEIGHT: 169 LBS | SYSTOLIC BLOOD PRESSURE: 110 MMHG | RESPIRATION RATE: 14 BRPM | HEART RATE: 70 BPM

## 2019-11-19 DIAGNOSIS — K59.00 CONSTIPATION, UNSPECIFIED CONSTIPATION TYPE: ICD-10-CM

## 2019-11-19 DIAGNOSIS — Z51.81 ENCOUNTER FOR THERAPEUTIC DRUG MONITORING: Primary | ICD-10-CM

## 2019-11-19 DIAGNOSIS — E66.9 OBESITY (BMI 30.0-34.9): ICD-10-CM

## 2019-11-19 DIAGNOSIS — E55.9 VITAMIN D DEFICIENCY: ICD-10-CM

## 2019-11-19 PROCEDURE — 99213 OFFICE O/P EST LOW 20 MIN: CPT | Performed by: NURSE PRACTITIONER

## 2019-11-19 RX ORDER — PHENTERMINE HYDROCHLORIDE 37.5 MG/1
1 TABLET ORAL DAILY
Refills: 2 | COMMUNITY
Start: 2019-11-05 | End: 2019-11-19

## 2019-11-19 RX ORDER — PHENTERMINE HYDROCHLORIDE 37.5 MG/1
37.5 TABLET ORAL DAILY
Qty: 30 TABLET | Refills: 2 | Status: SHIPPED | OUTPATIENT
Start: 2019-11-19 | End: 2020-03-04

## 2019-11-19 NOTE — PROGRESS NOTES
Christina Monday is a 52year old female presents today for 11 month follow-up on medical weight loss program for the treatment of overweight, obesity, or morbid obesity with associated Vitamin D deficiency, mild leptin elevation.     S:  Current weight Wt R S2 without clicks or gallops, no pedal edema.   GI: +BS, soft, non tender  NEURO/MS: motor and sensory grossly intact  PSYCH: pleasant, cooperative, normal mood and affect    ASSESSMENT AND PLAN:  Encounter for therapeutic drug monitoring  (primary encounte stretching/flexibility/balance- such as Yoga. [facility] schedule provided with options for strength training. Schedule your fitness and put it on your calendar. Recheck Vitamin D level with wellness labs.     Maintaining weight over the holidays can be a challen active  Exercise helps improve the working of your colon which helps ease constipation. Try to get some physical activity every day. If you haven’t been active for a while, talk to your healthcare provider before starting again.   Laxatives  Your healthcare

## 2019-11-19 NOTE — PATIENT INSTRUCTIONS
Continue making lifestyle changes that focus on good nutrition, regular exercise and stress management. Medication Plan: Continue current medication regimen.     Agreed upon goal/s before next office visit include: Develop a fitness routine gradually and effects such as bloating. Also increase the amount of water that you drink. Eating more of the following foods can add fiber to your diet.   · High-fiber cereals  · Whole grains, bran, and brown rice  · Vegetables such as carrots, broccoli, and greens  · Fr

## 2020-01-20 ENCOUNTER — TELEPHONE (OUTPATIENT)
Dept: INTERNAL MEDICINE CLINIC | Facility: CLINIC | Age: 50
End: 2020-01-20

## 2020-01-20 DIAGNOSIS — Z51.81 ENCOUNTER FOR THERAPEUTIC DRUG MONITORING: ICD-10-CM

## 2020-01-20 RX ORDER — PEN NEEDLE, DIABETIC 30 GX3/16"
1 NEEDLE, DISPOSABLE MISCELLANEOUS DAILY
Qty: 100 EACH | Refills: 1 | Status: SHIPPED | OUTPATIENT
Start: 2020-01-20 | End: 2020-03-04

## 2020-01-20 NOTE — TELEPHONE ENCOUNTER
Received a call from the pharmacy stating that patient got Saxenda at their pharmacy and no needles. They sent a request over for the needles and we sent back a denial.  She will forward to me now.   She was last seen in November and is to return in 3 justin

## 2020-01-27 ENCOUNTER — LAB ENCOUNTER (OUTPATIENT)
Dept: LAB | Age: 50
End: 2020-01-27
Attending: INTERNAL MEDICINE
Payer: COMMERCIAL

## 2020-01-27 DIAGNOSIS — Z13.0 SCREENING FOR BLOOD DISEASE: ICD-10-CM

## 2020-01-27 DIAGNOSIS — Z00.00 ROUTINE GENERAL MEDICAL EXAMINATION AT A HEALTH CARE FACILITY: ICD-10-CM

## 2020-01-27 DIAGNOSIS — Z13.29 SCREENING FOR THYROID DISORDER: ICD-10-CM

## 2020-01-27 DIAGNOSIS — Z13.220 SCREENING FOR LIPID DISORDERS: ICD-10-CM

## 2020-01-27 DIAGNOSIS — E55.9 VITAMIN D DEFICIENCY: ICD-10-CM

## 2020-01-27 DIAGNOSIS — Z13.228 SCREENING FOR METABOLIC DISORDER: ICD-10-CM

## 2020-01-27 LAB
ALBUMIN SERPL-MCNC: 3.8 G/DL (ref 3.4–5)
ALBUMIN/GLOB SERPL: 1 {RATIO} (ref 1–2)
ALP LIVER SERPL-CCNC: 128 U/L (ref 39–100)
ALT SERPL-CCNC: 44 U/L (ref 13–56)
ANION GAP SERPL CALC-SCNC: 6 MMOL/L (ref 0–18)
AST SERPL-CCNC: 26 U/L (ref 15–37)
BASOPHILS # BLD AUTO: 0.04 X10(3) UL (ref 0–0.2)
BASOPHILS NFR BLD AUTO: 0.7 %
BILIRUB SERPL-MCNC: 0.6 MG/DL (ref 0.1–2)
BUN BLD-MCNC: 15 MG/DL (ref 7–18)
BUN/CREAT SERPL: 18.5 (ref 10–20)
CALCIUM BLD-MCNC: 9.4 MG/DL (ref 8.5–10.1)
CHLORIDE SERPL-SCNC: 105 MMOL/L (ref 98–112)
CHOLEST SMN-MCNC: 218 MG/DL (ref ?–200)
CO2 SERPL-SCNC: 29 MMOL/L (ref 21–32)
CREAT BLD-MCNC: 0.81 MG/DL (ref 0.55–1.02)
DEPRECATED RDW RBC AUTO: 39.1 FL (ref 35.1–46.3)
EOSINOPHIL # BLD AUTO: 0.16 X10(3) UL (ref 0–0.7)
EOSINOPHIL NFR BLD AUTO: 2.8 %
ERYTHROCYTE [DISTWIDTH] IN BLOOD BY AUTOMATED COUNT: 11.3 % (ref 11–15)
GLOBULIN PLAS-MCNC: 3.9 G/DL (ref 2.8–4.4)
GLUCOSE BLD-MCNC: 79 MG/DL (ref 70–99)
HCT VFR BLD AUTO: 44.3 % (ref 35–48)
HDLC SERPL-MCNC: 87 MG/DL (ref 40–59)
HGB BLD-MCNC: 14.8 G/DL (ref 12–16)
IMM GRANULOCYTES # BLD AUTO: 0.01 X10(3) UL (ref 0–1)
IMM GRANULOCYTES NFR BLD: 0.2 %
LDLC SERPL CALC-MCNC: 117 MG/DL (ref ?–100)
LYMPHOCYTES # BLD AUTO: 1.78 X10(3) UL (ref 1–4)
LYMPHOCYTES NFR BLD AUTO: 31 %
M PROTEIN MFR SERPL ELPH: 7.7 G/DL (ref 6.4–8.2)
MCH RBC QN AUTO: 31.4 PG (ref 26–34)
MCHC RBC AUTO-ENTMCNC: 33.4 G/DL (ref 31–37)
MCV RBC AUTO: 94.1 FL (ref 80–100)
MONOCYTES # BLD AUTO: 0.41 X10(3) UL (ref 0.1–1)
MONOCYTES NFR BLD AUTO: 7.1 %
NEUTROPHILS # BLD AUTO: 3.35 X10 (3) UL (ref 1.5–7.7)
NEUTROPHILS # BLD AUTO: 3.35 X10(3) UL (ref 1.5–7.7)
NEUTROPHILS NFR BLD AUTO: 58.2 %
NONHDLC SERPL-MCNC: 131 MG/DL (ref ?–130)
OSMOLALITY SERPL CALC.SUM OF ELEC: 290 MOSM/KG (ref 275–295)
PATIENT FASTING Y/N/NP: YES
PATIENT FASTING Y/N/NP: YES
PLATELET # BLD AUTO: 317 10(3)UL (ref 150–450)
POTASSIUM SERPL-SCNC: 4.4 MMOL/L (ref 3.5–5.1)
RBC # BLD AUTO: 4.71 X10(6)UL (ref 3.8–5.3)
SODIUM SERPL-SCNC: 140 MMOL/L (ref 136–145)
TRIGL SERPL-MCNC: 70 MG/DL (ref 30–149)
TSI SER-ACNC: 2.98 MIU/ML (ref 0.36–3.74)
VIT D+METAB SERPL-MCNC: 61.8 NG/ML (ref 30–100)
VLDLC SERPL CALC-MCNC: 14 MG/DL (ref 0–30)
WBC # BLD AUTO: 5.8 X10(3) UL (ref 4–11)

## 2020-01-27 PROCEDURE — 84443 ASSAY THYROID STIM HORMONE: CPT

## 2020-01-27 PROCEDURE — 85025 COMPLETE CBC W/AUTO DIFF WBC: CPT

## 2020-01-27 PROCEDURE — 80053 COMPREHEN METABOLIC PANEL: CPT

## 2020-01-27 PROCEDURE — 80061 LIPID PANEL: CPT

## 2020-01-27 PROCEDURE — 82306 VITAMIN D 25 HYDROXY: CPT

## 2020-01-31 ENCOUNTER — OFFICE VISIT (OUTPATIENT)
Dept: INTERNAL MEDICINE CLINIC | Facility: CLINIC | Age: 50
End: 2020-01-31
Payer: COMMERCIAL

## 2020-01-31 VITALS
HEART RATE: 84 BPM | SYSTOLIC BLOOD PRESSURE: 132 MMHG | HEIGHT: 65.75 IN | RESPIRATION RATE: 16 BRPM | WEIGHT: 170 LBS | DIASTOLIC BLOOD PRESSURE: 84 MMHG | BODY MASS INDEX: 27.65 KG/M2 | OXYGEN SATURATION: 99 % | TEMPERATURE: 98 F

## 2020-01-31 DIAGNOSIS — R10.9 RIGHT FLANK PAIN: ICD-10-CM

## 2020-01-31 DIAGNOSIS — K59.00 CONSTIPATION, UNSPECIFIED CONSTIPATION TYPE: ICD-10-CM

## 2020-01-31 DIAGNOSIS — R74.8 ELEVATED ALKALINE PHOSPHATASE LEVEL: ICD-10-CM

## 2020-01-31 DIAGNOSIS — Z12.11 SCREEN FOR COLON CANCER: ICD-10-CM

## 2020-01-31 DIAGNOSIS — E55.9 VITAMIN D DEFICIENCY: ICD-10-CM

## 2020-01-31 DIAGNOSIS — Z12.31 ENCOUNTER FOR SCREENING MAMMOGRAM FOR MALIGNANT NEOPLASM OF BREAST: ICD-10-CM

## 2020-01-31 DIAGNOSIS — Z00.00 ROUTINE GENERAL MEDICAL EXAMINATION AT A HEALTH CARE FACILITY: Primary | ICD-10-CM

## 2020-01-31 PROBLEM — R10.11 RUQ ABDOMINAL PAIN: Status: RESOLVED | Noted: 2018-12-17 | Resolved: 2020-01-31

## 2020-01-31 LAB
BILIRUB UR QL STRIP.AUTO: NEGATIVE
CLARITY UR REFRACT.AUTO: CLEAR
GLUCOSE UR STRIP.AUTO-MCNC: NEGATIVE MG/DL
KETONES UR STRIP.AUTO-MCNC: NEGATIVE MG/DL
NITRITE UR QL STRIP.AUTO: NEGATIVE
PH UR STRIP.AUTO: 8 [PH] (ref 4.5–8)
PROT UR STRIP.AUTO-MCNC: NEGATIVE MG/DL
SP GR UR STRIP.AUTO: 1.01 (ref 1–1.03)
UROBILINOGEN UR STRIP.AUTO-MCNC: <2 MG/DL

## 2020-01-31 PROCEDURE — 81001 URINALYSIS AUTO W/SCOPE: CPT | Performed by: NURSE PRACTITIONER

## 2020-01-31 PROCEDURE — 99396 PREV VISIT EST AGE 40-64: CPT | Performed by: NURSE PRACTITIONER

## 2020-01-31 NOTE — PROGRESS NOTES
Wilson Block is a 52year old female who presents for a complete physical exam:       Patient complains of:     Weight loss clinic  Phentermine and Saxenda   Doing well   Has f/u with Brisa    Right flank pain with intermittent RUQ pain.   Saw GI  Per pain    • Abdominal pain, unspecified site 6/29/2012   • Constipation    • Costochondral chest pain 12/27/2014   • Weight gain       Past Surgical History:   Procedure Laterality Date   • CHOLECYSTECTOMY  6/2009   • COLONOSCOPY     • Caffie Kawasaki 27.65 kg/m².    GENERAL: well developed, well nourished,in no apparent distress  SKIN: no rashes,no suspicious lesions  HEENT: atraumatic, normocephalic,ears and throat are clear  EYES:PERRLA, EOMI, conjunctiva are clear  NECK: supple,no adenopathy,  LUNGS:

## 2020-02-14 ENCOUNTER — HOSPITAL ENCOUNTER (OUTPATIENT)
Dept: CT IMAGING | Facility: HOSPITAL | Age: 50
Discharge: HOME | End: 2020-02-14
Attending: NURSE PRACTITIONER
Payer: COMMERCIAL

## 2020-02-14 DIAGNOSIS — R10.9 RIGHT FLANK PAIN: ICD-10-CM

## 2020-02-14 PROCEDURE — 74176 CT ABD & PELVIS W/O CONTRAST: CPT | Performed by: NURSE PRACTITIONER

## 2020-02-15 ENCOUNTER — HOSPITAL ENCOUNTER (OUTPATIENT)
Dept: MAMMOGRAPHY | Facility: HOSPITAL | Age: 50
Discharge: HOME | End: 2020-02-15
Attending: NURSE PRACTITIONER
Payer: COMMERCIAL

## 2020-02-15 DIAGNOSIS — Z12.31 ENCOUNTER FOR SCREENING MAMMOGRAM FOR MALIGNANT NEOPLASM OF BREAST: ICD-10-CM

## 2020-02-15 PROCEDURE — 77067 SCR MAMMO BI INCL CAD: CPT | Performed by: NURSE PRACTITIONER

## 2020-02-15 PROCEDURE — 77063 BREAST TOMOSYNTHESIS BI: CPT | Performed by: NURSE PRACTITIONER

## 2020-03-04 ENCOUNTER — OFFICE VISIT (OUTPATIENT)
Dept: INTERNAL MEDICINE CLINIC | Facility: CLINIC | Age: 50
End: 2020-03-04
Payer: COMMERCIAL

## 2020-03-04 VITALS
DIASTOLIC BLOOD PRESSURE: 60 MMHG | WEIGHT: 169 LBS | RESPIRATION RATE: 16 BRPM | HEIGHT: 65.5 IN | BODY MASS INDEX: 27.82 KG/M2 | SYSTOLIC BLOOD PRESSURE: 110 MMHG | HEART RATE: 91 BPM

## 2020-03-04 DIAGNOSIS — E66.9 OBESITY (BMI 30.0-34.9): ICD-10-CM

## 2020-03-04 DIAGNOSIS — Z51.81 ENCOUNTER FOR THERAPEUTIC DRUG MONITORING: Primary | ICD-10-CM

## 2020-03-04 PROCEDURE — 99213 OFFICE O/P EST LOW 20 MIN: CPT | Performed by: NURSE PRACTITIONER

## 2020-03-04 RX ORDER — PEN NEEDLE, DIABETIC 30 GX3/16"
1 NEEDLE, DISPOSABLE MISCELLANEOUS DAILY
Qty: 100 EACH | Refills: 1 | Status: SHIPPED | OUTPATIENT
Start: 2020-03-04 | End: 2020-10-26 | Stop reason: ALTCHOICE

## 2020-03-04 RX ORDER — PHENTERMINE HYDROCHLORIDE 37.5 MG/1
37.5 TABLET ORAL DAILY
Qty: 30 TABLET | Refills: 2 | Status: SHIPPED | OUTPATIENT
Start: 2020-03-04 | End: 2020-06-17

## 2020-03-04 NOTE — PROGRESS NOTES
Sybil Quintanilla is a 52year old female presents today for follow-up on medical weight loss program for the treatment of overweight, obesity, or morbid obesity with associated Vitamin D deficiency, mild leptin elevation.     S:  Current weight Wt Readings f motor and sensory grossly intact  PSYCH: pleasant, cooperative, normal mood and affect    ASSESSMENT AND PLAN:  Encounter for therapeutic drug monitoring  (primary encounter diagnosis)  Obesity (bmi 30.0-34.9)    No orders of the defined types were placed keeping daily carbs around or under 100 grams/day. Daily caloric needs recommended at 1300/day. Additionally fitness frequency and variety is necessary.  Develop a fitness routine gradually and safely to continue to lose and maintain weight loss, build s off of the answers to these questions. If you are a busy mom and you want to lose weight to gain more energy, you might not have a lot of time. Maybe your only form of exercise is keeping up with your kids. In this case, you may want to start small.  For ex chin-ups, planks, etc.  The FITT Principle: Final Considerations  You can use the FITT Principle for cardio exercise, strength-based exercise, stretching and more. However, before you start any exercise plan, first consult with your healthcare provider.  Sujey Dailey

## 2020-03-04 NOTE — PATIENT INSTRUCTIONS
Continue making lifestyle changes that focus on good nutrition, regular exercise and stress management. Medication Plan: Continue current medication regimen. If Saxenda no longer cost affordable will consider Contrave or Wellbutrin XL as alternative. extended periods every day to lose weight and keep it off. What is considered an “effective” exercise varies between people.  Factors that affect this include age, fitness level, mobility, health conditions, etc.  Before you begin an exercise plan and decid Should You Do for Exercise? Should you hit up the elliptical at the gym? Should you go for a hike? The type of exercise you do depends on what you like and what results you want.   For example, if you want to improve your cardio-vascular fitness and you lo

## 2020-06-17 ENCOUNTER — VIRTUAL PHONE E/M (OUTPATIENT)
Dept: INTERNAL MEDICINE CLINIC | Facility: CLINIC | Age: 50
End: 2020-06-17

## 2020-06-17 DIAGNOSIS — Z51.81 ENCOUNTER FOR THERAPEUTIC DRUG MONITORING: Primary | ICD-10-CM

## 2020-06-17 DIAGNOSIS — E66.9 OBESITY (BMI 30.0-34.9): ICD-10-CM

## 2020-06-17 PROCEDURE — 99213 OFFICE O/P EST LOW 20 MIN: CPT | Performed by: NURSE PRACTITIONER

## 2020-06-17 RX ORDER — PHENTERMINE HYDROCHLORIDE 37.5 MG/1
37.5 TABLET ORAL DAILY
Qty: 30 TABLET | Refills: 2 | Status: SHIPPED | OUTPATIENT
Start: 2020-06-17 | End: 2020-10-19

## 2020-06-17 NOTE — PATIENT INSTRUCTIONS
Thank you for taking the time for our virtual encounter today! Here are the next steps and plans we discussed:    1. Continue your medications, no changes. 2. Great job with choosing alternative fitness with the gym closed.  Consider adding at home options can use to fight back.   Hormones  When your brain detects the presence of a threat, no matter if it is a snake in the grass, a grumpy boss, or a big credit card bill, it triggers the release of a cascade of chemicals, including adrenaline, CRH, and cortiso jeans you splurged on, leading to more stress about money wasted!  Unfortunately, excess cortisol also slows down your metabolism, because your body wants to maintain an adequate supply of glucose for all that hard mental and physical work dealing with the more likely to drive through the [address] [place] [address], rather than taking the time and mental energy to plan and cook a meal. Americans are less likely to cook and eat dinner at home than people from many other countries, and they also work more hours.  Working exercise has a one-two punch. It can decrease cortisol and trigger release of chemicals that relieve pain and improve mood.  It can also help speed your metabolism so you burn off the extra indulgences  Eat Mindfully  Mindful Eating programs train you in me Ph.D

## 2020-06-17 NOTE — PROGRESS NOTES
Virtual Telephone Check-In    Raegan Joshi verbally consents to a Virtual/Telephone Check-In visit on 6/17/2020. Patient understands and accepts financial responsibility for any deductible, co-insurance and/or co-pays associated with this service. time  -  on resources for support and alternate home fitness programs  -     Phentermine HCl 37.5 MG Oral Tab; Take 1 tablet (37.5 mg total) by mouth daily. Medication side effects and monitoring reviewed with patient.   Patient verbalizes und

## 2020-06-18 ENCOUNTER — TELEPHONE (OUTPATIENT)
Dept: INTERNAL MEDICINE CLINIC | Facility: CLINIC | Age: 50
End: 2020-06-18

## 2020-06-18 DIAGNOSIS — E66.9 OBESITY (BMI 30.0-34.9): ICD-10-CM

## 2020-06-18 DIAGNOSIS — Z51.81 ENCOUNTER FOR THERAPEUTIC DRUG MONITORING: Primary | ICD-10-CM

## 2020-06-18 NOTE — TELEPHONE ENCOUNTER
Received request from [address] [place] [address] to complete PA for [place] (Key: [id number])  Entered in [address] [place] [address]

## 2020-06-25 NOTE — TELEPHONE ENCOUNTER
This request has received a Cancelled outcome. This may mean either your patient does not have active coverage with this plan, this authorization was processed as a duplicate request, or an authorization was not needed for this medication.     Note any a

## 2020-06-26 DIAGNOSIS — Z78.9 PARTICIPANT IN HEALTH AND WELLNESS PLAN: Primary | ICD-10-CM

## 2020-06-30 NOTE — TELEPHONE ENCOUNTER
I called [facility] and spoke with Jono Millan there. She said patient must obtain RX through mail order - it does not need a pa. I sent my chart to patient to find out what mail order she uses as it is her responsibility to contact her insurance.

## 2020-07-02 ENCOUNTER — NURSE ONLY (OUTPATIENT)
Dept: LAB | Age: 50
End: 2020-07-02
Attending: PREVENTIVE MEDICINE

## 2020-07-02 DIAGNOSIS — Z78.9 PARTICIPANT IN HEALTH AND WELLNESS PLAN: ICD-10-CM

## 2020-07-02 PROCEDURE — 86769 SARS-COV-2 COVID-19 ANTIBODY: CPT

## 2020-07-03 LAB — SARS-COV-2 IGG SERPLBLD QL IA.RAPID: NEGATIVE

## 2020-07-06 ENCOUNTER — TELEPHONE (OUTPATIENT)
Dept: INTERNAL MEDICINE CLINIC | Facility: CLINIC | Age: 50
End: 2020-07-06

## 2020-07-14 NOTE — TELEPHONE ENCOUNTER
I went onto website to checked status of PA and got this message    Your prior authorization for Mp Cavazos has been approved! MORE INFO  For eligible patients, copay assistance may be available.  To learn more and be redirected to the [place] website, click

## 2020-07-16 ENCOUNTER — TELEPHONE (OUTPATIENT)
Dept: INTERNAL MEDICINE CLINIC | Facility: CLINIC | Age: 50
End: 2020-07-16

## 2020-07-16 NOTE — TELEPHONE ENCOUNTER
We sent letter to pt to tell her she is to have CT of abd/pelvis prior to having a colonoscopy-there is no order in the system-please enter the order and call pt back

## 2020-07-16 NOTE — TELEPHONE ENCOUNTER
Please look into why letter that was sent in May states she needs CT? It was done following the ov in February and is not outstanding. No need for CT to be completed again or at this time. Back to me with response.

## 2020-07-16 NOTE — TELEPHONE ENCOUNTER
Spoke with patient apologized and notified it was a mistake on our end. Patient aware no need for CT scan. Patient verbalized understanding and agreeable to POC. Printed letter and gave to [facility] lead she will look into this.    DERICKI to [place].

## 2020-07-16 NOTE — TELEPHONE ENCOUNTER
LOV-01/31/2020  ASSESSMENT AND PLAN:       HEALTH MAINTENANCE:  Labs reviewed. Mammogram ordered.   For screening colonoscopy age 48.      Routine general medical examination at a health care facility  (primary encounter diagnosis)  Vitamin d deficiency  S

## 2020-07-18 ENCOUNTER — APPOINTMENT (OUTPATIENT)
Dept: LAB | Facility: HOSPITAL | Age: 50
End: 2020-07-18
Attending: NURSE PRACTITIONER
Payer: COMMERCIAL

## 2020-07-18 DIAGNOSIS — R10.9 RIGHT FLANK PAIN: ICD-10-CM

## 2020-07-18 DIAGNOSIS — R74.8 ELEVATED ALKALINE PHOSPHATASE LEVEL: ICD-10-CM

## 2020-07-18 LAB
ALBUMIN SERPL-MCNC: 3.8 G/DL (ref 3.4–5)
ALBUMIN/GLOB SERPL: 1 {RATIO} (ref 1–2)
ALP LIVER SERPL-CCNC: 114 U/L (ref 39–100)
ALT SERPL-CCNC: 41 U/L (ref 13–56)
ANION GAP SERPL CALC-SCNC: 3 MMOL/L (ref 0–18)
AST SERPL-CCNC: 26 U/L (ref 15–37)
BILIRUB SERPL-MCNC: 0.5 MG/DL (ref 0.1–2)
BUN BLD-MCNC: 14 MG/DL (ref 7–18)
BUN/CREAT SERPL: 20.3 (ref 10–20)
CALCIUM BLD-MCNC: 8.9 MG/DL (ref 8.5–10.1)
CHLORIDE SERPL-SCNC: 107 MMOL/L (ref 98–112)
CO2 SERPL-SCNC: 30 MMOL/L (ref 21–32)
CREAT BLD-MCNC: 0.69 MG/DL (ref 0.55–1.02)
GLOBULIN PLAS-MCNC: 3.7 G/DL (ref 2.8–4.4)
GLUCOSE BLD-MCNC: 90 MG/DL (ref 70–99)
M PROTEIN MFR SERPL ELPH: 7.5 G/DL (ref 6.4–8.2)
OSMOLALITY SERPL CALC.SUM OF ELEC: 290 MOSM/KG (ref 275–295)
PATIENT FASTING Y/N/NP: YES
POTASSIUM SERPL-SCNC: 4.1 MMOL/L (ref 3.5–5.1)
SODIUM SERPL-SCNC: 140 MMOL/L (ref 136–145)

## 2020-07-18 PROCEDURE — 84075 ASSAY ALKALINE PHOSPHATASE: CPT

## 2020-07-18 PROCEDURE — 84080 ASSAY ALKALINE PHOSPHATASES: CPT

## 2020-07-18 PROCEDURE — 80053 COMPREHEN METABOLIC PANEL: CPT

## 2020-07-18 PROCEDURE — 36415 COLL VENOUS BLD VENIPUNCTURE: CPT

## 2020-07-22 LAB
ALK-PHOSPHATASE BONE CALC: 13 U/L
ALK-PHOSPHATASE LIVER CALC: 99 U/L
ALK-PHOSPHATASE OTHER CALC: 0 U/L
ALKALINE PHOSPHATASE: 112 U/L

## 2020-07-23 DIAGNOSIS — R74.8 ELEVATED ALKALINE PHOSPHATASE LEVEL: Primary | ICD-10-CM

## 2020-07-23 DIAGNOSIS — D18.03 LIVER HEMANGIOMA: ICD-10-CM

## 2020-08-08 ENCOUNTER — HOSPITAL ENCOUNTER (OUTPATIENT)
Dept: ULTRASOUND IMAGING | Age: 50
Discharge: HOME | End: 2020-08-08
Attending: NURSE PRACTITIONER
Payer: COMMERCIAL

## 2020-08-08 DIAGNOSIS — D18.03 LIVER HEMANGIOMA: ICD-10-CM

## 2020-08-08 DIAGNOSIS — R74.8 ELEVATED ALKALINE PHOSPHATASE LEVEL: ICD-10-CM

## 2020-08-08 PROCEDURE — 76700 US EXAM ABDOM COMPLETE: CPT | Performed by: NURSE PRACTITIONER

## 2020-10-09 ENCOUNTER — APPOINTMENT (OUTPATIENT)
Dept: LAB | Facility: HOSPITAL | Age: 50
End: 2020-10-09
Attending: INTERNAL MEDICINE
Payer: COMMERCIAL

## 2020-10-09 ENCOUNTER — LAB ENCOUNTER (OUTPATIENT)
Dept: LAB | Facility: HOSPITAL | Age: 50
End: 2020-10-09
Attending: NURSE PRACTITIONER
Payer: COMMERCIAL

## 2020-10-09 DIAGNOSIS — D18.03 LIVER HEMANGIOMA: ICD-10-CM

## 2020-10-09 DIAGNOSIS — Z01.818 PRE-OP TESTING: ICD-10-CM

## 2020-10-09 DIAGNOSIS — R74.8 ELEVATED ALKALINE PHOSPHATASE LEVEL: ICD-10-CM

## 2020-10-09 PROCEDURE — 84075 ASSAY ALKALINE PHOSPHATASE: CPT

## 2020-10-09 PROCEDURE — 36415 COLL VENOUS BLD VENIPUNCTURE: CPT

## 2020-10-09 PROCEDURE — 80053 COMPREHEN METABOLIC PANEL: CPT

## 2020-10-09 PROCEDURE — 84080 ASSAY ALKALINE PHOSPHATASES: CPT

## 2020-10-12 PROBLEM — Z12.11 SPECIAL SCREENING FOR MALIGNANT NEOPLASMS, COLON: Status: ACTIVE | Noted: 2020-10-12

## 2020-10-19 ENCOUNTER — OFFICE VISIT (OUTPATIENT)
Dept: OBGYN CLINIC | Facility: CLINIC | Age: 50
End: 2020-10-19
Payer: COMMERCIAL

## 2020-10-19 VITALS
DIASTOLIC BLOOD PRESSURE: 72 MMHG | HEART RATE: 89 BPM | HEIGHT: 65.5 IN | BODY MASS INDEX: 29.14 KG/M2 | SYSTOLIC BLOOD PRESSURE: 138 MMHG | WEIGHT: 177 LBS

## 2020-10-19 DIAGNOSIS — Z30.432 ENCOUNTER FOR REMOVAL OF INTRAUTERINE CONTRACEPTIVE DEVICE: Primary | ICD-10-CM

## 2020-10-19 PROCEDURE — 3078F DIAST BP <80 MM HG: CPT | Performed by: OBSTETRICS & GYNECOLOGY

## 2020-10-19 PROCEDURE — 3008F BODY MASS INDEX DOCD: CPT | Performed by: OBSTETRICS & GYNECOLOGY

## 2020-10-19 PROCEDURE — 3075F SYST BP GE 130 - 139MM HG: CPT | Performed by: OBSTETRICS & GYNECOLOGY

## 2020-10-19 PROCEDURE — 58301 REMOVE INTRAUTERINE DEVICE: CPT | Performed by: OBSTETRICS & GYNECOLOGY

## 2020-10-19 NOTE — PROGRESS NOTES
Matthew Jeong is a 48year old female  No LMP recorded. (Menstrual status: IUD - Intrauterine Device).  Patient presents with:  Wellness Visit  IUD: IUD Removal  .  Patient has no complaints, no menses for the past 2 years  OBSTETRICS HISTORY:  OB Social connections        Talks on phone: Not on file        Gets together: Not on file        Attends [employer] service: Not on file        Active member of club or organization: Not on file        Attends meetings of clubs or organizations: Not on file 1    ALLERGIES:  No Known Allergies      PHYSICAL EXAM:   Pelvic Exam:  External Genitalia: normal appearance, hair distribution, and no lesions  Urethral Meatus:  normal in size, location, without lesions and prolapse  Bladder:  No fullness, masses or ten

## 2020-10-26 ENCOUNTER — OFFICE VISIT (OUTPATIENT)
Dept: SURGERY | Facility: CLINIC | Age: 50
End: 2020-10-26
Payer: COMMERCIAL

## 2020-10-26 VITALS
BODY MASS INDEX: 29.17 KG/M2 | HEART RATE: 106 BPM | OXYGEN SATURATION: 96 % | HEIGHT: 65.5 IN | SYSTOLIC BLOOD PRESSURE: 154 MMHG | WEIGHT: 177.19 LBS | DIASTOLIC BLOOD PRESSURE: 103 MMHG | RESPIRATION RATE: 16 BRPM

## 2020-10-26 DIAGNOSIS — R79.89 ELEVATED LIVER FUNCTION TESTS: Primary | ICD-10-CM

## 2020-10-26 NOTE — PROGRESS NOTES
[facility]  [address], [address]  [address]  [phone] ([phone]) Mother    • No Known Problems Paternal Grandmother    • No Known Problems Paternal Grandfather    • No Known Problems Daughter    • No Known Problems Son       Social History    Tobacco Use      Smoking status: Never Smoker      Smokeless tobacco: Never Us

## 2020-12-14 ENCOUNTER — TELEMEDICINE (OUTPATIENT)
Dept: INTERNAL MEDICINE CLINIC | Facility: CLINIC | Age: 50
End: 2020-12-14

## 2020-12-14 DIAGNOSIS — R63.5 WEIGHT GAIN: ICD-10-CM

## 2020-12-14 DIAGNOSIS — E66.9 OBESITY (BMI 30.0-34.9): ICD-10-CM

## 2020-12-14 DIAGNOSIS — Z51.81 ENCOUNTER FOR THERAPEUTIC DRUG MONITORING: Primary | ICD-10-CM

## 2020-12-14 PROCEDURE — 99213 OFFICE O/P EST LOW 20 MIN: CPT | Performed by: NURSE PRACTITIONER

## 2020-12-14 RX ORDER — PEN NEEDLE, DIABETIC 30 GX3/16"
1 NEEDLE, DISPOSABLE MISCELLANEOUS DAILY
Qty: 100 EACH | Refills: 1 | Status: SHIPPED | OUTPATIENT
Start: 2020-12-14 | End: 2021-03-19

## 2020-12-14 RX ORDER — PHENTERMINE HYDROCHLORIDE 37.5 MG/1
37.5 TABLET ORAL DAILY
Qty: 30 TABLET | Refills: 0 | Status: SHIPPED | OUTPATIENT
Start: 2020-12-14 | End: 2021-02-03

## 2020-12-14 RX ORDER — LIRAGLUTIDE 6 MG/ML
3 INJECTION, SOLUTION SUBCUTANEOUS DAILY
Qty: 15 PEN | Refills: 1 | Status: SHIPPED | OUTPATIENT
Start: 2020-12-14 | End: 2021-07-13 | Stop reason: ALTCHOICE

## 2020-12-14 NOTE — PROGRESS NOTES
[facility] Weight Management follow up via video visit:    Subjective    This visit is conducted using Telemedicine with live, interactive video and audio.     Chief Complaint:  Follow up visit for lifestyle and medical management for overweight, Cristina Johnson Resp: Breathing is non labored  Psych: patient appears cheerful, smiling, making good eye contact    Diagnoses and all orders for this visit:    Encounter for therapeutic drug monitoring  -     Liraglutide -Weight Management (SAXENDA) 18 MG/3ML Subcutaneou Agreed upon goal/s before next office visit include: I highlighted resources below to help support getting back on track. Resume tracking nutrition to remain accountable for both quality and quantity of food.  Recommend keeping daily carbs under 100 grams/d ? MCFP  ? Family holidays  ? Neighborhood environment  ? Income level  This category also includes environments with many high-calorie and tempting foods that are available around the clock.  Some people have limited access to healthy food and places · AnySize Fitness @ http://anysizefitArcadia Eco[employer]ies.[employer]thenas [employer] Personal training and Group Fitness classes with Valentina Edwards [phone]  · 360FIT Ria @ [email or web address]-tobar.org/.  Group Fitness [phone] and/or email Jorge Gr at [email or web address] · Fitlosophy Fitspiration - journal to better health (found at [employer] in fitness aisle)  · Whole Again by Mica Chapman - discovering your true self after trauma  · Manjit Diaz talk on Netflix, The Call to Courage  · Podcasts:  The Doctor's Farmacy by LISA

## 2020-12-14 NOTE — PATIENT INSTRUCTIONS
Continue making lifestyle changes that focus on good nutrition, regular exercise and stress management. Medication Plan: Restart phentermine at 1/2 tab daily in AM for 7 days, then increase to a full tab daily as prescribed.  Restart Saxenda daily as dir Your biology can influence your physiology and metabolism, which then influences your appetite, how you burn calories, and how you store fat. Biological changes can come from:  ? Aging (loss of muscle mass and a slowing metabolism)  ?  Pregnancy and menopa · [facility] HELEN and [employer] @ [email or web address] Full fitness center with group fitness and personal training. Discount available as client of [facility].   · Health Coaching and Per · Atomic Habits by Ronald Bee  · The End of Dieting: How to Live for Life by Dr. Fernando Guidry M.D. · The Complete Guide to fasting by Dr. Kaye Burks  · Gurvinder Ritter by Jude Begum, Ph.D, R.D.   · The Game Changers- Abound Logic Documentary on plant based nutr

## 2020-12-17 ENCOUNTER — IMMUNIZATION (OUTPATIENT)
Dept: LAB | Facility: HOSPITAL | Age: 50
End: 2020-12-17
Attending: PREVENTIVE MEDICINE
Payer: COMMERCIAL

## 2020-12-17 DIAGNOSIS — Z23 NEED FOR VACCINATION: ICD-10-CM

## 2020-12-17 PROCEDURE — 0001A PFIZER-BIONTECH COVID-19 VACCINE: CPT

## 2020-12-24 ENCOUNTER — TELEPHONE (OUTPATIENT)
Dept: INTERNAL MEDICINE CLINIC | Facility: CLINIC | Age: 50
End: 2020-12-24

## 2020-12-24 NOTE — TELEPHONE ENCOUNTER
Received faxed request for PA for Saxenda to be completed on [address] (Scott: Evelyne Palmer)  Entered on [facility] [place]  Awaiting decision

## 2020-12-30 ENCOUNTER — TELEPHONE (OUTPATIENT)
Dept: INTERNAL MEDICINE CLINIC | Facility: CLINIC | Age: 50
End: 2020-12-30

## 2020-12-30 ENCOUNTER — LAB ENCOUNTER (OUTPATIENT)
Dept: LAB | Facility: HOSPITAL | Age: 50
End: 2020-12-30
Attending: INTERNAL MEDICINE
Payer: COMMERCIAL

## 2020-12-30 DIAGNOSIS — R79.89 ELEVATED LIVER FUNCTION TESTS: ICD-10-CM

## 2020-12-30 LAB
HAV AB SER QL IA: REACTIVE
HAV IGM SER QL: NONREACTIVE
HBV SURFACE AB SER QL: REACTIVE
HBV SURFACE AB SERPL IA-ACNC: >1000 MIU/ML
HBV SURFACE AG SER-ACNC: <0.1 [IU]/L
HBV SURFACE AG SERPL QL IA: NONREACTIVE
HCV AB SERPL QL IA: NONREACTIVE

## 2020-12-30 PROCEDURE — 36415 COLL VENOUS BLD VENIPUNCTURE: CPT

## 2020-12-30 PROCEDURE — 83516 IMMUNOASSAY NONANTIBODY: CPT

## 2020-12-30 PROCEDURE — 86708 HEPATITIS A ANTIBODY: CPT

## 2020-12-30 PROCEDURE — 87340 HEPATITIS B SURFACE AG IA: CPT

## 2020-12-30 PROCEDURE — 82103 ALPHA-1-ANTITRYPSIN TOTAL: CPT

## 2020-12-30 PROCEDURE — 82104 ALPHA-1-ANTITRYPSIN PHENO: CPT

## 2020-12-30 PROCEDURE — 86706 HEP B SURFACE ANTIBODY: CPT

## 2020-12-30 PROCEDURE — 86803 HEPATITIS C AB TEST: CPT

## 2020-12-30 PROCEDURE — 86709 HEPATITIS A IGM ANTIBODY: CPT

## 2020-12-30 NOTE — TELEPHONE ENCOUNTER
Patient is requesting 3 month Rx for Saxenda as her insurance is changing before the new year.     Requesting Saxenda  LOV: 12/14/20  RTC: one month  Last Relevant Labs: na  Filled: 12/14/20 #15 pens with 1 refills    Future Appointments   Date Time Provide

## 2020-12-30 NOTE — TELEPHONE ENCOUNTER
Would like switched before new year because ins is changing       Name of Medication:  Edmundo Espinoza  - ins requires 3 month supply , pharm only recived 2 month supply ,    Dose:     How is medication prescribed:    Specific name of pharmacy and location: [facility]

## 2020-12-31 NOTE — TELEPHONE ENCOUNTER
I received a message from patient yesterday about a 3 month RX for [address] [place] [address] which was already sent. Encounter closed.

## 2021-01-01 LAB — MITOCHONDRIAL M2 AB, IGG: 2.6 UNITS

## 2021-01-02 LAB — ALPHA-1-ANTITRYPSIN: 130 MG/DL

## 2021-01-05 ENCOUNTER — TELEPHONE (OUTPATIENT)
Dept: INTERNAL MEDICINE CLINIC | Facility: CLINIC | Age: 51
End: 2021-01-05

## 2021-01-05 DIAGNOSIS — Z13.228 SCREENING FOR METABOLIC DISORDER: ICD-10-CM

## 2021-01-05 DIAGNOSIS — Z13.29 SCREENING FOR THYROID DISORDER: ICD-10-CM

## 2021-01-05 DIAGNOSIS — Z13.0 SCREENING FOR BLOOD DISEASE: ICD-10-CM

## 2021-01-05 DIAGNOSIS — Z13.220 SCREENING FOR LIPID DISORDERS: ICD-10-CM

## 2021-01-05 DIAGNOSIS — Z00.00 ROUTINE GENERAL MEDICAL EXAMINATION AT A HEALTH CARE FACILITY: Primary | ICD-10-CM

## 2021-01-05 NOTE — TELEPHONE ENCOUNTER
Future Appointments   Date Time Provider Leora Hansne   3/19/2021  8:15 AM Ji Carranza MD [facility]     Orders to edward- Pt informed that labs need to be completed no sooner than 2 weeks prior to the appt.  Pt aware to fast-no call virgilio

## 2021-01-07 ENCOUNTER — IMMUNIZATION (OUTPATIENT)
Dept: LAB | Facility: HOSPITAL | Age: 51
End: 2021-01-07
Attending: PREVENTIVE MEDICINE

## 2021-01-07 DIAGNOSIS — Z23 NEED FOR VACCINATION: ICD-10-CM

## 2021-01-07 PROCEDURE — 0002A SARSCOV2 VAC 30MCG/0.3ML IM: CPT

## 2021-01-08 ENCOUNTER — TELEPHONE (OUTPATIENT)
Dept: SURGERY | Facility: CLINIC | Age: 51
End: 2021-01-08

## 2021-01-09 NOTE — TELEPHONE ENCOUNTER
Discussed results with patient. Immune to hepatitis A and B, but serologies otherwise negative. Encouraged to get ultrasound in March as planned and follow up then.

## 2021-02-03 ENCOUNTER — OFFICE VISIT (OUTPATIENT)
Dept: INTERNAL MEDICINE CLINIC | Facility: CLINIC | Age: 51
End: 2021-02-03
Payer: COMMERCIAL

## 2021-02-03 VITALS
BODY MASS INDEX: 29.96 KG/M2 | WEIGHT: 182 LBS | SYSTOLIC BLOOD PRESSURE: 132 MMHG | HEIGHT: 65.5 IN | HEART RATE: 70 BPM | RESPIRATION RATE: 14 BRPM | DIASTOLIC BLOOD PRESSURE: 80 MMHG

## 2021-02-03 DIAGNOSIS — Z51.81 ENCOUNTER FOR THERAPEUTIC DRUG MONITORING: Primary | ICD-10-CM

## 2021-02-03 DIAGNOSIS — E66.9 OBESITY (BMI 30.0-34.9): ICD-10-CM

## 2021-02-03 DIAGNOSIS — R63.5 WEIGHT GAIN: ICD-10-CM

## 2021-02-03 PROCEDURE — 3008F BODY MASS INDEX DOCD: CPT | Performed by: NURSE PRACTITIONER

## 2021-02-03 PROCEDURE — 3075F SYST BP GE 130 - 139MM HG: CPT | Performed by: NURSE PRACTITIONER

## 2021-02-03 PROCEDURE — 99213 OFFICE O/P EST LOW 20 MIN: CPT | Performed by: NURSE PRACTITIONER

## 2021-02-03 PROCEDURE — 3079F DIAST BP 80-89 MM HG: CPT | Performed by: NURSE PRACTITIONER

## 2021-02-03 RX ORDER — PHENTERMINE HYDROCHLORIDE 37.5 MG/1
37.5 TABLET ORAL DAILY
Qty: 30 TABLET | Refills: 2 | Status: SHIPPED | OUTPATIENT
Start: 2021-02-03 | End: 2021-07-13 | Stop reason: ALTCHOICE

## 2021-02-03 NOTE — PROGRESS NOTES
Corin Loza is a 48year old female presents today for follow-up on medical weight loss program for the treatment of overweight, obesity, or morbid obesity with associated Vitamin D deficiency, mild leptin elevation.     S:  Current weight Wt Readings f CARDIO: RRR without murmur, normal S1 and S2 without clicks or gallops, no pedal edema.   GI: +BS  NEURO/MS: motor and sensory grossly intact  PSYCH: pleasant, cooperative, normal mood and affect    ASSESSMENT AND PLAN:  Encounter for therapeutic drug monit New year, new YOU! Set your intentions for health and wellness in the new year. Get back to the basics. Remind yourself of the 4 Pillars of Health (Sleep, stress, fitness and nutrition). Actively be concious and aware of your choices.  Listen to the Mayank Bassett 87 Eating a healthier diet is also about more than just weight-loss.  Nutritious foods fuel your brain and body so they can perform their best. When cleaning up your diet, meal prepping and cooking new dishes, keep in mind all the ways you are benefiting your · [place] 7 Minute Workout (orange box with white 7) - free on the go HIIT training benji  · Monique Benji @ www. onepeloton. com  · 5 Minute Kitchen Workout [email or web address]-zumtiqv-[email or web address]    Nutrition Trackers and Tools  · LoseIT!  And My Fitness P · Podcasts: The Eddye Button by Dr. Ale Castillo, The Exam Room by the Physician's Committee, Nutrition Facts by Dr. Fox Mullins        Medication use and SEs reviewed with patient. Return in about 3 months (around 5/3/2021) for weight management.     Natalie

## 2021-02-03 NOTE — PATIENT INSTRUCTIONS
Continue making lifestyle changes that focus on good nutrition, regular exercise and stress management. Medication Plan: Restart phentermine at 1/2 tab daily in AM for 7 days, then increase to a full tab daily as prescribed. Continue Saxenda.     Monitor Get the idea out of your head that you have to exercise solely because you need to lose weight. Physical activity has so many functional benefits, including stress management and better sleep.  This year, set measurable and attainable goals for getting more · Health Coaching and Personal Training with Mickie Morocho at our [facility]- individual weekly coaching with option to add personal training and small group fitness classes targeted at weight loss- [phone] and/or email @ [email or web address] · Sugar, Salt & Fat by Erika Max, Ph.D, R.D. · The Game Changers- Netflix Documentary on plant based nutrition  · Fed Up - documentary about obesity (Free on [facility])  · The Truth About Sugar - documentary on sugar (Free on [employer], https://youtu. be/9E9bnj

## 2021-03-01 ENCOUNTER — HOSPITAL ENCOUNTER (OUTPATIENT)
Dept: ULTRASOUND IMAGING | Facility: HOSPITAL | Age: 51
Discharge: HOME | End: 2021-03-01
Attending: INTERNAL MEDICINE
Payer: COMMERCIAL

## 2021-03-01 DIAGNOSIS — R79.89 ELEVATED LIVER FUNCTION TESTS: ICD-10-CM

## 2021-03-01 PROCEDURE — 76981 USE PARENCHYMA: CPT | Performed by: INTERNAL MEDICINE

## 2021-03-01 PROCEDURE — 76705 ECHO EXAM OF ABDOMEN: CPT | Performed by: INTERNAL MEDICINE

## 2021-03-12 ENCOUNTER — LAB ENCOUNTER (OUTPATIENT)
Dept: LAB | Age: 51
End: 2021-03-12
Attending: INTERNAL MEDICINE
Payer: COMMERCIAL

## 2021-03-12 DIAGNOSIS — Z13.29 SCREENING FOR THYROID DISORDER: ICD-10-CM

## 2021-03-12 DIAGNOSIS — Z00.00 ROUTINE GENERAL MEDICAL EXAMINATION AT A HEALTH CARE FACILITY: ICD-10-CM

## 2021-03-12 DIAGNOSIS — Z13.0 SCREENING FOR BLOOD DISEASE: ICD-10-CM

## 2021-03-12 DIAGNOSIS — Z13.220 SCREENING FOR LIPID DISORDERS: ICD-10-CM

## 2021-03-12 DIAGNOSIS — Z13.228 SCREENING FOR METABOLIC DISORDER: ICD-10-CM

## 2021-03-12 LAB
ALBUMIN SERPL-MCNC: 4 G/DL (ref 3.4–5)
ALBUMIN/GLOB SERPL: 1.2 {RATIO} (ref 1–2)
ALP LIVER SERPL-CCNC: 110 U/L
ALT SERPL-CCNC: 33 U/L
ANION GAP SERPL CALC-SCNC: 4 MMOL/L (ref 0–18)
AST SERPL-CCNC: 21 U/L (ref 15–37)
BASOPHILS # BLD AUTO: 0.05 X10(3) UL (ref 0–0.2)
BASOPHILS NFR BLD AUTO: 0.8 %
BILIRUB SERPL-MCNC: 0.6 MG/DL (ref 0.1–2)
BUN BLD-MCNC: 15 MG/DL (ref 7–18)
BUN/CREAT SERPL: 21.4 (ref 10–20)
CALCIUM BLD-MCNC: 9.1 MG/DL (ref 8.5–10.1)
CHLORIDE SERPL-SCNC: 105 MMOL/L (ref 98–112)
CHOLEST SMN-MCNC: 233 MG/DL (ref ?–200)
CO2 SERPL-SCNC: 30 MMOL/L (ref 21–32)
CREAT BLD-MCNC: 0.7 MG/DL
DEPRECATED RDW RBC AUTO: 40.5 FL (ref 35.1–46.3)
EOSINOPHIL # BLD AUTO: 0.18 X10(3) UL (ref 0–0.7)
EOSINOPHIL NFR BLD AUTO: 2.9 %
ERYTHROCYTE [DISTWIDTH] IN BLOOD BY AUTOMATED COUNT: 11.8 % (ref 11–15)
GLOBULIN PLAS-MCNC: 3.4 G/DL (ref 2.8–4.4)
GLUCOSE BLD-MCNC: 88 MG/DL (ref 70–99)
HCT VFR BLD AUTO: 42.6 %
HDLC SERPL-MCNC: 87 MG/DL (ref 40–59)
HGB BLD-MCNC: 13.9 G/DL
IMM GRANULOCYTES # BLD AUTO: 0.05 X10(3) UL (ref 0–1)
IMM GRANULOCYTES NFR BLD: 0.8 %
LDLC SERPL CALC-MCNC: 131 MG/DL (ref ?–100)
LYMPHOCYTES # BLD AUTO: 2.19 X10(3) UL (ref 1–4)
LYMPHOCYTES NFR BLD AUTO: 35.6 %
M PROTEIN MFR SERPL ELPH: 7.4 G/DL (ref 6.4–8.2)
MCH RBC QN AUTO: 31.1 PG (ref 26–34)
MCHC RBC AUTO-ENTMCNC: 32.6 G/DL (ref 31–37)
MCV RBC AUTO: 95.3 FL
MONOCYTES # BLD AUTO: 0.47 X10(3) UL (ref 0.1–1)
MONOCYTES NFR BLD AUTO: 7.6 %
NEUTROPHILS # BLD AUTO: 3.21 X10 (3) UL (ref 1.5–7.7)
NEUTROPHILS # BLD AUTO: 3.21 X10(3) UL (ref 1.5–7.7)
NEUTROPHILS NFR BLD AUTO: 52.3 %
NONHDLC SERPL-MCNC: 146 MG/DL (ref ?–130)
OSMOLALITY SERPL CALC.SUM OF ELEC: 288 MOSM/KG (ref 275–295)
PATIENT FASTING Y/N/NP: YES
PATIENT FASTING Y/N/NP: YES
PLATELET # BLD AUTO: 313 10(3)UL (ref 150–450)
POTASSIUM SERPL-SCNC: 4.2 MMOL/L (ref 3.5–5.1)
RBC # BLD AUTO: 4.47 X10(6)UL
SODIUM SERPL-SCNC: 139 MMOL/L (ref 136–145)
TRIGL SERPL-MCNC: 77 MG/DL (ref 30–149)
TSI SER-ACNC: 2.64 MIU/ML (ref 0.36–3.74)
VLDLC SERPL CALC-MCNC: 15 MG/DL (ref 0–30)
WBC # BLD AUTO: 6.2 X10(3) UL (ref 4–11)

## 2021-03-12 PROCEDURE — 84443 ASSAY THYROID STIM HORMONE: CPT

## 2021-03-12 PROCEDURE — 80061 LIPID PANEL: CPT

## 2021-03-12 PROCEDURE — 80053 COMPREHEN METABOLIC PANEL: CPT

## 2021-03-12 PROCEDURE — 36415 COLL VENOUS BLD VENIPUNCTURE: CPT

## 2021-03-12 PROCEDURE — 85025 COMPLETE CBC W/AUTO DIFF WBC: CPT

## 2021-03-19 ENCOUNTER — OFFICE VISIT (OUTPATIENT)
Dept: INTERNAL MEDICINE CLINIC | Facility: CLINIC | Age: 51
End: 2021-03-19
Payer: COMMERCIAL

## 2021-03-19 VITALS
HEIGHT: 65.35 IN | HEART RATE: 75 BPM | WEIGHT: 184 LBS | SYSTOLIC BLOOD PRESSURE: 124 MMHG | BODY MASS INDEX: 30.29 KG/M2 | DIASTOLIC BLOOD PRESSURE: 82 MMHG | OXYGEN SATURATION: 98 % | TEMPERATURE: 97 F

## 2021-03-19 DIAGNOSIS — Z12.31 VISIT FOR SCREENING MAMMOGRAM: ICD-10-CM

## 2021-03-19 DIAGNOSIS — Z00.00 PE (PHYSICAL EXAM), ANNUAL: Primary | ICD-10-CM

## 2021-03-19 PROCEDURE — 3074F SYST BP LT 130 MM HG: CPT | Performed by: INTERNAL MEDICINE

## 2021-03-19 PROCEDURE — 3008F BODY MASS INDEX DOCD: CPT | Performed by: INTERNAL MEDICINE

## 2021-03-19 PROCEDURE — 99396 PREV VISIT EST AGE 40-64: CPT | Performed by: INTERNAL MEDICINE

## 2021-03-19 PROCEDURE — 3079F DIAST BP 80-89 MM HG: CPT | Performed by: INTERNAL MEDICINE

## 2021-03-19 NOTE — PROGRESS NOTES
Patient presents with:  Physical: LM rm 9  Health Maintenance: due for mammogram      HPI:  Her for cpe  Pt due for mammo. Feels well. Labs reiwed, chol borderline. Review of Systems   Constitutional: Negative for fever, chills and fatigue.  No distr Onset   • Heart Disease Maternal Grandmother    • Heart Attack Maternal Grandmother    • Other (Malignant Melanoma of the Skin) Maternal Grandfather    • Cancer Father 68        BLADDER CANCER   • No Known Problems Mother    • No Known Problems Paternal Gr distal pulses. No murmur, rubs or gallops. Pulmonary/Chest: Effort normal and breath sounds normal. No respiratory distress. No wheezes, rhonchi or rales  Abdominal: Soft. Bowel sounds are normal. Non tender, no masses, no organomegaly or hernias.   Musc

## 2021-03-23 DIAGNOSIS — R63.5 WEIGHT GAIN: ICD-10-CM

## 2021-03-23 DIAGNOSIS — Z51.81 ENCOUNTER FOR THERAPEUTIC DRUG MONITORING: ICD-10-CM

## 2021-03-23 DIAGNOSIS — E66.9 OBESITY (BMI 30.0-34.9): ICD-10-CM

## 2021-03-23 RX ORDER — LIRAGLUTIDE 6 MG/ML
3 INJECTION, SOLUTION SUBCUTANEOUS DAILY
Refills: 1 | OUTPATIENT
Start: 2021-03-23

## 2021-03-23 NOTE — TELEPHONE ENCOUNTER
Requesting Lisa  LOV: 2/3/21  RTC: 3 months  Last Relevant Labs: na  Filled: 12/14/20 #15 pens with 1 refills    RX was sent to [address] [place] [address] - denied this request from [facility]

## 2021-04-10 DIAGNOSIS — R79.89 ELEVATED LIVER FUNCTION TESTS: Primary | ICD-10-CM

## 2021-04-22 ENCOUNTER — HOSPITAL ENCOUNTER (OUTPATIENT)
Dept: MAMMOGRAPHY | Facility: HOSPITAL | Age: 51
Discharge: HOME | End: 2021-04-22
Attending: INTERNAL MEDICINE
Payer: COMMERCIAL

## 2021-04-22 DIAGNOSIS — Z12.31 VISIT FOR SCREENING MAMMOGRAM: ICD-10-CM

## 2021-04-22 PROCEDURE — 77063 BREAST TOMOSYNTHESIS BI: CPT | Performed by: INTERNAL MEDICINE

## 2021-04-22 PROCEDURE — 77067 SCR MAMMO BI INCL CAD: CPT | Performed by: INTERNAL MEDICINE

## 2021-07-13 ENCOUNTER — OFFICE VISIT (OUTPATIENT)
Dept: INTERNAL MEDICINE CLINIC | Facility: CLINIC | Age: 51
End: 2021-07-13
Payer: COMMERCIAL

## 2021-07-13 VITALS
SYSTOLIC BLOOD PRESSURE: 134 MMHG | WEIGHT: 189 LBS | HEIGHT: 63.35 IN | BODY MASS INDEX: 33.07 KG/M2 | DIASTOLIC BLOOD PRESSURE: 86 MMHG | HEART RATE: 92 BPM | TEMPERATURE: 98 F

## 2021-07-13 DIAGNOSIS — F43.21 GRIEF REACTION: ICD-10-CM

## 2021-07-13 DIAGNOSIS — M62.9 MUSCULOSKELETAL DISORDER INVOLVING UPPER TRAPEZIUS MUSCLE: ICD-10-CM

## 2021-07-13 DIAGNOSIS — Z82.49 FAMILY HISTORY OF ANEURYSM OF BLOOD VESSEL OF BRAIN: ICD-10-CM

## 2021-07-13 DIAGNOSIS — R51.9 NEW ONSET HEADACHE: Primary | ICD-10-CM

## 2021-07-13 PROBLEM — Z12.11 SPECIAL SCREENING FOR MALIGNANT NEOPLASMS, COLON: Status: RESOLVED | Noted: 2020-10-12 | Resolved: 2021-07-13

## 2021-07-13 PROCEDURE — 3079F DIAST BP 80-89 MM HG: CPT | Performed by: NURSE PRACTITIONER

## 2021-07-13 PROCEDURE — 99214 OFFICE O/P EST MOD 30 MIN: CPT | Performed by: NURSE PRACTITIONER

## 2021-07-13 PROCEDURE — 3008F BODY MASS INDEX DOCD: CPT | Performed by: NURSE PRACTITIONER

## 2021-07-13 PROCEDURE — 3075F SYST BP GE 130 - 139MM HG: CPT | Performed by: NURSE PRACTITIONER

## 2021-07-13 RX ORDER — NAPROXEN 500 MG/1
500 TABLET ORAL 2 TIMES DAILY WITH MEALS
Qty: 30 TABLET | Refills: 0 | Status: SHIPPED | OUTPATIENT
Start: 2021-07-13 | End: 2021-11-30

## 2021-07-13 RX ORDER — SERTRALINE HYDROCHLORIDE 25 MG/1
25 TABLET, FILM COATED ORAL DAILY
Qty: 90 TABLET | Refills: 1 | Status: SHIPPED | OUTPATIENT
Start: 2021-07-13 | End: 2021-11-30

## 2021-07-13 NOTE — PROGRESS NOTES
Courtney Zimmerman is a 48year old female. Patient presents with:  Headache: AJ rm 10 head pain in back of head and sometimes lower back of head. pt said she has noticed some facial numbness that comes and goes      HPI:   Here for eval of head pain.   Poste Maternal Grandfather    • Cancer Father 68        BLADDER CANCER   • No Known Problems Mother    • No Known Problems Paternal Grandmother    • No Known Problems Paternal Grandfather    • No Known Problems Daughter    • No Known Problems Son         Brant Cohen Visit:  Requested Prescriptions     Signed Prescriptions Disp Refills   • naproxen 500 MG Oral Tab 30 tablet 0     Sig: Take 1 tablet (500 mg total) by mouth 2 (two) times daily with meals.    • Sertraline HCl 25 MG Oral Tab 90 tablet 1     Sig: Take 1 tabl

## 2021-08-02 ENCOUNTER — OFFICE VISIT (OUTPATIENT)
Dept: INTEGRATIVE MEDICINE | Facility: CLINIC | Age: 51
End: 2021-08-02
Payer: COMMERCIAL

## 2021-08-02 DIAGNOSIS — M54.2 NECK PAIN: Primary | ICD-10-CM

## 2021-08-02 PROCEDURE — 97811 ACUP 1/> W/O ESTIM EA ADD 15: CPT | Performed by: ACUPUNCTURIST

## 2021-08-02 PROCEDURE — 97810 ACUP 1/> WO ESTIM 1ST 15 MIN: CPT | Performed by: ACUPUNCTURIST

## 2021-08-02 NOTE — PROGRESS NOTES
Parker Fisher is a 48year old female Acupuncture Therapy. Complaints:  1. Neck and shoulder tightness and pain  2. Occipital HA   3. Stress  4.  Bilateral thumb pain    Response to last TX: NA    HPI: Asad complains of bilateral neck and shoulder odn

## 2021-08-09 ENCOUNTER — TELEPHONE (OUTPATIENT)
Dept: INTEGRATIVE MEDICINE | Facility: CLINIC | Age: 51
End: 2021-08-09

## 2021-08-09 ENCOUNTER — OFFICE VISIT (OUTPATIENT)
Dept: INTEGRATIVE MEDICINE | Facility: CLINIC | Age: 51
End: 2021-08-09
Payer: COMMERCIAL

## 2021-08-09 DIAGNOSIS — M54.2 NECK PAIN: Primary | ICD-10-CM

## 2021-08-09 PROCEDURE — 97810 ACUP 1/> WO ESTIM 1ST 15 MIN: CPT | Performed by: ACUPUNCTURIST

## 2021-08-09 PROCEDURE — 97811 ACUP 1/> W/O ESTIM EA ADD 15: CPT | Performed by: ACUPUNCTURIST

## 2021-08-09 NOTE — TELEPHONE ENCOUNTER
Referral for ACUPUNCTURE;  received and entered into Epic.   Referral sent to scanning:  NO    Authorization number: n/a  # of visits approved: 15    Dates approved: 8/2/2021     Order on file: NO  Order sent to scanning: NO    Referral for tracking purpose

## 2021-08-09 NOTE — PROGRESS NOTES
Melba Cain is a 48year old female Acupuncture Therapy. Complaints:  1. Neck and shoulder tightness and pain  2. Stress  3. Bilateral thumb pain     Response to last TX: Neck pain improved by 40%.  Today she is primarily concerned with he neck/should

## 2021-08-16 ENCOUNTER — OFFICE VISIT (OUTPATIENT)
Dept: INTEGRATIVE MEDICINE | Facility: CLINIC | Age: 51
End: 2021-08-16
Payer: COMMERCIAL

## 2021-08-16 DIAGNOSIS — M79.641 BILATERAL HAND PAIN: ICD-10-CM

## 2021-08-16 DIAGNOSIS — M54.2 NECK PAIN: Primary | ICD-10-CM

## 2021-08-16 DIAGNOSIS — M79.642 BILATERAL HAND PAIN: ICD-10-CM

## 2021-08-16 PROCEDURE — 97810 ACUP 1/> WO ESTIM 1ST 15 MIN: CPT | Performed by: ACUPUNCTURIST

## 2021-08-16 PROCEDURE — 97811 ACUP 1/> W/O ESTIM EA ADD 15: CPT | Performed by: ACUPUNCTURIST

## 2021-08-23 ENCOUNTER — OFFICE VISIT (OUTPATIENT)
Dept: INTEGRATIVE MEDICINE | Facility: CLINIC | Age: 51
End: 2021-08-23
Payer: COMMERCIAL

## 2021-08-23 DIAGNOSIS — M79.642 BILATERAL HAND PAIN: ICD-10-CM

## 2021-08-23 DIAGNOSIS — M79.641 BILATERAL HAND PAIN: ICD-10-CM

## 2021-08-23 DIAGNOSIS — M54.2 NECK PAIN: Primary | ICD-10-CM

## 2021-08-23 PROCEDURE — 97811 ACUP 1/> W/O ESTIM EA ADD 15: CPT | Performed by: ACUPUNCTURIST

## 2021-08-23 PROCEDURE — 97810 ACUP 1/> WO ESTIM 1ST 15 MIN: CPT | Performed by: ACUPUNCTURIST

## 2021-08-23 NOTE — PROGRESS NOTES
Christina Monday is a 46year old female Acupuncture Therapy. Complaints:  1. Neck and shoulder tightness and pain  2. Stress  3. Bilateral thumb pain     Response to last TX: Neck pain improved by 60-70% and is feeling much better.   Her bilateral thumb p

## 2021-08-30 ENCOUNTER — OFFICE VISIT (OUTPATIENT)
Dept: INTEGRATIVE MEDICINE | Facility: CLINIC | Age: 51
End: 2021-08-30
Payer: COMMERCIAL

## 2021-08-30 DIAGNOSIS — M54.2 NECK PAIN: Primary | ICD-10-CM

## 2021-08-30 PROCEDURE — 97811 ACUP 1/> W/O ESTIM EA ADD 15: CPT | Performed by: ACUPUNCTURIST

## 2021-08-30 PROCEDURE — 97810 ACUP 1/> WO ESTIM 1ST 15 MIN: CPT | Performed by: ACUPUNCTURIST

## 2021-08-30 NOTE — PROGRESS NOTES
Ricardo Szymanski is a 46year old female Acupuncture Therapy. Complaints:  1. Neck and shoulder tightness and pain  2. Stress  3.  Bilateral thumb pain     Response to last TX: Neck pain improved by 80% and is feeling much better.  Her bilateral thumb pain

## 2021-11-30 ENCOUNTER — OFFICE VISIT (OUTPATIENT)
Dept: INTERNAL MEDICINE CLINIC | Facility: CLINIC | Age: 51
End: 2021-11-30
Payer: COMMERCIAL

## 2021-11-30 VITALS
RESPIRATION RATE: 16 BRPM | DIASTOLIC BLOOD PRESSURE: 82 MMHG | SYSTOLIC BLOOD PRESSURE: 124 MMHG | BODY MASS INDEX: 33.42 KG/M2 | HEART RATE: 100 BPM | HEIGHT: 65.5 IN | WEIGHT: 203 LBS | OXYGEN SATURATION: 95 %

## 2021-11-30 DIAGNOSIS — Z51.81 ENCOUNTER FOR THERAPEUTIC DRUG MONITORING: Primary | ICD-10-CM

## 2021-11-30 DIAGNOSIS — R63.5 WEIGHT GAIN: ICD-10-CM

## 2021-11-30 DIAGNOSIS — F43.9 STRESS: ICD-10-CM

## 2021-11-30 DIAGNOSIS — E66.9 OBESITY WITH BODY MASS INDEX (BMI) OF 30.0 TO 39.9: ICD-10-CM

## 2021-11-30 DIAGNOSIS — E55.9 VITAMIN D DEFICIENCY: ICD-10-CM

## 2021-11-30 PROCEDURE — 99214 OFFICE O/P EST MOD 30 MIN: CPT | Performed by: NURSE PRACTITIONER

## 2021-11-30 PROCEDURE — 3008F BODY MASS INDEX DOCD: CPT | Performed by: NURSE PRACTITIONER

## 2021-11-30 PROCEDURE — 3074F SYST BP LT 130 MM HG: CPT | Performed by: NURSE PRACTITIONER

## 2021-11-30 PROCEDURE — 3079F DIAST BP 80-89 MM HG: CPT | Performed by: NURSE PRACTITIONER

## 2021-11-30 RX ORDER — PHENTERMINE HYDROCHLORIDE 37.5 MG/1
37.5 TABLET ORAL
Qty: 30 TABLET | Refills: 1 | Status: SHIPPED | OUTPATIENT
Start: 2021-11-30 | End: 2022-01-18

## 2021-11-30 NOTE — PATIENT INSTRUCTIONS
We are here to support you with weight loss, but please remember that you still need your primary care provider for your routine health maintenance.       PLAN:  Will restart phentermine 37.5 mg (cut tablet in 1/2 X 4 days) and then take the full dose  Chec Activity level: not very active (can't count exercise towards calorie number per day)                   ** Daily INPUT> Look at nutrition section-- \"nutrients\" and it will break down your macros for the day (ie. Protein, carbs, fibers, sugars and fats).

## 2021-11-30 NOTE — PROGRESS NOTES
HISTORY OF PRESENT ILLNESS  Patient presents with:  Weight Check: up 21 since LOV 02/2021    Hero Moreno is a 46year old female here for follow up with medical weight loss program for the treatment of overweight, obesity, or morbid obesity.      Up #21 all fields, breathing non labored  CARDIO: RRR without murmur  GI: +BS, NT/ND, no masses or HSM  EXTREMITIES: no cyanosis, no clubbing, no edema  PSYCH: pleasant, cooperative, normal mood and affect    Lab Results   Component Value Date    GLU 88 03/12/202 Loss: 0 lbs  Total weight loss: up #21 lbs total, Net loss - lbs  · Will restart medications: phentermine 37.5mg (cut tablet in 1/2 X 4 days and then take full dose)  · Mentioned possibly trying wegovy (since she had taken in the past)- will wait for 1 mon

## 2022-01-18 DIAGNOSIS — E66.9 OBESITY WITH BODY MASS INDEX (BMI) OF 30.0 TO 39.9: ICD-10-CM

## 2022-01-18 DIAGNOSIS — Z51.81 ENCOUNTER FOR THERAPEUTIC DRUG MONITORING: ICD-10-CM

## 2022-01-18 RX ORDER — PHENTERMINE HYDROCHLORIDE 37.5 MG/1
37.5 TABLET ORAL
Qty: 30 TABLET | Refills: 0 | Status: SHIPPED | OUTPATIENT
Start: 2022-01-18 | End: 2022-02-03

## 2022-01-18 NOTE — TELEPHONE ENCOUNTER
Requesting Phentermine 37.5 mg  LOV: 11/30/21  RTC: 5 weeks  Last Relevant Labs: na  Filled: 11/30/21 #30 with 1 refills last [place]30/21 [place]    Future Appointments   Date Time Provider Leora Hansen   2/3/2022  9:40 Angelica Guido,

## 2022-01-18 NOTE — TELEPHONE ENCOUNTER
Name of Medication:    Phentermine HCl 37.5 MG Oral Tab     Dose:     How is medication prescribed:    Specific name of pharmacy and location: [facility], [address] [place] [address], [address] [place] [address] [phone], [phone]    Name of mail or

## 2022-02-03 ENCOUNTER — OFFICE VISIT (OUTPATIENT)
Dept: INTERNAL MEDICINE CLINIC | Facility: CLINIC | Age: 52
End: 2022-02-03
Payer: COMMERCIAL

## 2022-02-03 VITALS
RESPIRATION RATE: 16 BRPM | OXYGEN SATURATION: 97 % | WEIGHT: 199 LBS | HEIGHT: 65.5 IN | HEART RATE: 83 BPM | SYSTOLIC BLOOD PRESSURE: 128 MMHG | DIASTOLIC BLOOD PRESSURE: 82 MMHG | BODY MASS INDEX: 32.76 KG/M2

## 2022-02-03 DIAGNOSIS — E55.9 VITAMIN D DEFICIENCY: ICD-10-CM

## 2022-02-03 DIAGNOSIS — E66.9 OBESITY WITH BODY MASS INDEX (BMI) OF 30.0 TO 39.9: ICD-10-CM

## 2022-02-03 DIAGNOSIS — Z51.81 ENCOUNTER FOR THERAPEUTIC DRUG MONITORING: Primary | ICD-10-CM

## 2022-02-03 DIAGNOSIS — F43.9 STRESS: ICD-10-CM

## 2022-02-03 PROCEDURE — 99214 OFFICE O/P EST MOD 30 MIN: CPT | Performed by: NURSE PRACTITIONER

## 2022-02-03 PROCEDURE — 3008F BODY MASS INDEX DOCD: CPT | Performed by: NURSE PRACTITIONER

## 2022-02-03 PROCEDURE — 3074F SYST BP LT 130 MM HG: CPT | Performed by: NURSE PRACTITIONER

## 2022-02-03 PROCEDURE — 3079F DIAST BP 80-89 MM HG: CPT | Performed by: NURSE PRACTITIONER

## 2022-02-03 RX ORDER — PHENTERMINE HYDROCHLORIDE 37.5 MG/1
37.5 TABLET ORAL
Qty: 30 TABLET | Refills: 1 | Status: SHIPPED | OUTPATIENT
Start: 2022-02-15 | End: 2022-03-24

## 2022-02-03 RX ORDER — SEMAGLUTIDE 0.25 MG/.5ML
0.25 INJECTION, SOLUTION SUBCUTANEOUS WEEKLY
Qty: 2 ML | Refills: 0 | Status: SHIPPED | OUTPATIENT
Start: 2022-02-03 | End: 2022-02-25

## 2022-02-03 NOTE — PATIENT INSTRUCTIONS
We are here to support you with weight loss, but please remember that you still need your primary care provider for your routine health maintenance. PLAN:  Will continue with phentermine 37.5mg and will start wegovy 0.25mg X 4 weeks and then increase 0.5mg weekly X 4 weeks   Follow up with me in 6 weeks  Schedule follow up appointments: Red Florentino (dietitian) or Owen Larsen (presurgery dietitian)   Check for insurance coverage for dietitian and labwork prior to scheduling appointment. Please try to work on the following dietary changes:  1. Goals: Aim for 20-30 grams of protein/ meal  i. Aim for 110 grams of carbohydrates/day  ii. Eat 4-6 vegetables/day  iii. Avoid skipping meals- eat every 4-5 hours  iv. Aim for 3 meals/day  2. Drink lots of water and cut down on soda/juice consumption if soda/juice drinker  3. Focus on protein: (15-30 grams with each meal) ie. greek yogurt, cottage cheese, string cheese, hard boiled eggs  4. Healthy snacks: peanut butter and apples, hummus and carrots, berries, nuts (1/4 cup), tuna and crackers                 Protein Shakes: Premier protein or Core Power                Protein Bars: Rx Bars, Oatmega, Power Crunch                 Sargento balanced breaks (cheese and nuts)- without chocolate  5. Reduce carbohydrates which includes sweets as well as rice, pasta, potatoes, bread, corn and instead choose whole grain options or more protein or vegetables (4-6 servings of vegetables per day)  6. Get a good night of sleep  7. Try to decrease stress in life     Please download apps:  1. \"My Fitness Pal\" (other option is Lose it)) to help you to monitor daily dietary intake and you will be able to see if you are eating the right amount of calories, protein, carbs                With My Fitness Pal-->When you set-up the neyda or need to adjust settings:                Goals should include:                  Lose 1.5-2 lbs per week                Activity level: not very active (can't count exercise towards calorie number per day)                   ** Daily INPUT> Look at nutrition section-- \"nutrients\" and it will break down your macros for the day (ie. Protein, carbs, fibers, sugars and fats). Try to stay within these numbers daily     2. \"7 minute workout\" to help with exercise/activity which takes 7 minutes of your day and that you can do at home! 3. \"Calm\" or \"Headspace\" which helps with mindfulness, meditation, clarity, sleep, and anu to your daily life. 4. [employer] blog for healthy recipe ideas  5. Quire for low carb resources    HIGH PROTEIN SNACK IDEAS  -cottage cheese  -plain yogurt  -kefir  -hard-boiled eggs  -natural cheeses  -nuts (measure portion size)   -unsweetened nut butters  -dried edamame   -vincenzo seeds soaked in water or almond milk  -soy nuts  -cured meats (monitor for sodium issues)   -hummus with vegetables  -bean dip with vegetables     FRUIT  Low carb fruit options   Raspberries: Half a cup (60 grams) contains 3 grams of carbs. Blackberries: Half a cup (70 grams) contains 4 grams of carbs. Strawberries: Half a cup (100 grams) contains 6 grams of carbs. Blueberries: Half a cup (50 grams) contains 6 grams of carbs. Plum: One medium-sized (80 grams) contains 6 grams of carbs.      VEGETABLES  Low carb vegetables

## 2022-02-24 RX ORDER — SEMAGLUTIDE 0.25 MG/.5ML
0.25 INJECTION, SOLUTION SUBCUTANEOUS WEEKLY
Qty: 2 ML | Refills: 0 | Status: CANCELLED | OUTPATIENT
Start: 2022-02-24 | End: 2022-03-18

## 2022-02-25 NOTE — TELEPHONE ENCOUNTER
Requesting Wegovy increase dose  LOV: 2/3/22  RTC: 6 weeks   Last Relevant Labs: na  Filled: 2/3/22 #2ml with 0 refills at 0.25 mg    Future Appointments   Date Time Provider Leora Hansen   3/24/2022  9:00 AM GERRY Long [id number]

## 2022-02-27 RX ORDER — SEMAGLUTIDE 0.5 MG/.5ML
0.5 INJECTION, SOLUTION SUBCUTANEOUS WEEKLY
Qty: 2 ML | Refills: 0 | Status: SHIPPED | OUTPATIENT
Start: 2022-02-27 | End: 2022-03-21

## 2022-03-24 ENCOUNTER — OFFICE VISIT (OUTPATIENT)
Dept: INTERNAL MEDICINE CLINIC | Facility: CLINIC | Age: 52
End: 2022-03-24
Payer: COMMERCIAL

## 2022-03-24 VITALS
DIASTOLIC BLOOD PRESSURE: 80 MMHG | HEART RATE: 94 BPM | BODY MASS INDEX: 31.11 KG/M2 | HEIGHT: 65.5 IN | SYSTOLIC BLOOD PRESSURE: 118 MMHG | RESPIRATION RATE: 16 BRPM | WEIGHT: 189 LBS | OXYGEN SATURATION: 99 %

## 2022-03-24 DIAGNOSIS — F43.9 STRESS: ICD-10-CM

## 2022-03-24 DIAGNOSIS — E66.9 OBESITY WITH BODY MASS INDEX (BMI) OF 30.0 TO 39.9: ICD-10-CM

## 2022-03-24 DIAGNOSIS — E55.9 VITAMIN D DEFICIENCY: ICD-10-CM

## 2022-03-24 DIAGNOSIS — Z51.81 ENCOUNTER FOR THERAPEUTIC DRUG MONITORING: Primary | ICD-10-CM

## 2022-03-24 PROBLEM — D64.9 ANEMIA: Status: ACTIVE | Noted: 2022-03-24

## 2022-03-24 PROCEDURE — 3079F DIAST BP 80-89 MM HG: CPT | Performed by: NURSE PRACTITIONER

## 2022-03-24 PROCEDURE — 3074F SYST BP LT 130 MM HG: CPT | Performed by: NURSE PRACTITIONER

## 2022-03-24 PROCEDURE — 3008F BODY MASS INDEX DOCD: CPT | Performed by: NURSE PRACTITIONER

## 2022-03-24 PROCEDURE — 99213 OFFICE O/P EST LOW 20 MIN: CPT | Performed by: NURSE PRACTITIONER

## 2022-03-24 RX ORDER — PHENTERMINE HYDROCHLORIDE 37.5 MG/1
37.5 TABLET ORAL
Qty: 30 TABLET | Refills: 1 | Status: SHIPPED | OUTPATIENT
Start: 2022-04-12

## 2022-03-24 RX ORDER — SEMAGLUTIDE 1 MG/.5ML
1 INJECTION, SOLUTION SUBCUTANEOUS WEEKLY
Qty: 2 ML | Refills: 0 | Status: SHIPPED | OUTPATIENT
Start: 2022-03-24 | End: 2022-04-15

## 2022-03-24 NOTE — PATIENT INSTRUCTIONS
We are here to support you with weight loss, but please remember that you still need your primary care provider for your routine health maintenance. PLAN:  Will continue with phentermine and increase wegovy 1 weekly X 4 weeks and then increase to 1.7mg weekly   Follow up with me in 6 weeks  Schedule follow up appointments: Lorri Posey (dietitian) or Tiffany Mac (presurgery dietitian)   Check for insurance coverage for dietitian and labwork prior to scheduling appointment. Please try to work on the following dietary changes:  1. Goals: Aim for 20-30 grams of protein/ meal  i. Aim for 100 grams of carbohydrates/day  ii. Eat 4-6 vegetables/day  iii. Avoid skipping meals- eat every 4-5 hours  iv. Aim for 3 meals/day  2. Drink lots of water and cut down on soda/juice consumption if soda/juice drinker  3. Focus on protein: (15-30 grams with each meal) ie. greek yogurt, cottage cheese, string cheese, hard boiled eggs  4. Healthy snacks: peanut butter and apples, hummus and carrots, berries, nuts (1/4 cup), tuna and crackers                 Protein Shakes: Premier protein or Core Power                Protein Bars: Rx Bars, Oatmega, Power Crunch                 Sargento balanced breaks (cheese and nuts)- without chocolate  5. Reduce carbohydrates which includes sweets as well as rice, pasta, potatoes, bread, corn and instead choose whole grain options or more protein or vegetables (4-6 servings of vegetables per day)  6. Get a good night of sleep  7. Try to decrease stress in life     Please download apps:  1. \"My Fitness Pal\" (other option is Lose it)) to help you to monitor daily dietary intake and you will be able to see if you are eating the right amount of calories, protein, carbs                With My Fitness Pal-->When you set-up the neyda or need to adjust settings:                Goals should include:                  Lose 1.5-2 lbs per week                Activity level: not very active (can't count exercise towards calorie number per day)                   ** Daily INPUT> Look at nutrition section-- \"nutrients\" and it will break down your macros for the day (ie. Protein, carbs, fibers, sugars and fats). Try to stay within these numbers daily     2. \"7 minute workout\" to help with exercise/activity which takes 7 minutes of your day and that you can do at home! 3. \"Calm\" or \"Headspace\" which helps with mindfulness, meditation, clarity, sleep, and anu to your daily life. 4. [employer] blog for healthy recipe ideas  5. [employer] for low carb resources    HIGH PROTEIN SNACK IDEAS  -cottage cheese  -plain yogurt  -kefir  -hard-boiled eggs  -natural cheeses  -nuts (measure portion size)   -unsweetened nut butters  -dried edamame   -vincenzo seeds soaked in water or almond milk  -soy nuts  -cured meats (monitor for sodium issues)   -hummus with vegetables  -bean dip with vegetables     FRUIT  Low carb fruit options   Raspberries: Half a cup (60 grams) contains 3 grams of carbs. Blackberries: Half a cup (70 grams) contains 4 grams of carbs. Strawberries: Half a cup (100 grams) contains 6 grams of carbs. Blueberries: Half a cup (50 grams) contains 6 grams of carbs. Plum: One medium-sized (80 grams) contains 6 grams of carbs.      VEGETABLES  Low carb vegetables

## 2022-04-27 ENCOUNTER — TELEPHONE (OUTPATIENT)
Dept: INTERNAL MEDICINE CLINIC | Facility: CLINIC | Age: 52
End: 2022-04-27

## 2022-04-27 RX ORDER — SEMAGLUTIDE 1.7 MG/.75ML
1.7 INJECTION, SOLUTION SUBCUTANEOUS WEEKLY
Qty: 3 ML | Refills: 0 | Status: SHIPPED | OUTPATIENT
Start: 2022-04-27 | End: 2022-05-19

## 2022-04-27 NOTE — TELEPHONE ENCOUNTER
Requesting Increase Wegovy  LOV: 3/24/22  RTC: 6 weeks  Last Relevant Labs: na  Filled: 3/24/22 #2ml with 0 refills  wegovy 1 mg    Future Appointments   Date Time Provider Leora Hansen   5/5/2022  9:00 AM GERRY [facility] [id number]     Patient is ready to increase her dose

## 2022-04-27 NOTE — TELEPHONE ENCOUNTER
Pt states that she was at one time prescribed  Wegovy. Pt states that med is no longer listed on her mychart. Pt is requesting meds assitance.

## 2022-05-05 ENCOUNTER — OFFICE VISIT (OUTPATIENT)
Dept: INTERNAL MEDICINE CLINIC | Facility: CLINIC | Age: 52
End: 2022-05-05
Payer: COMMERCIAL

## 2022-05-05 VITALS
WEIGHT: 181 LBS | SYSTOLIC BLOOD PRESSURE: 120 MMHG | OXYGEN SATURATION: 98 % | HEIGHT: 65.5 IN | RESPIRATION RATE: 16 BRPM | BODY MASS INDEX: 29.79 KG/M2 | DIASTOLIC BLOOD PRESSURE: 78 MMHG | HEART RATE: 94 BPM

## 2022-05-05 DIAGNOSIS — E55.9 VITAMIN D DEFICIENCY: ICD-10-CM

## 2022-05-05 DIAGNOSIS — E66.9 OBESITY WITH BODY MASS INDEX (BMI) OF 30.0 TO 39.9: ICD-10-CM

## 2022-05-05 DIAGNOSIS — Z51.81 ENCOUNTER FOR THERAPEUTIC DRUG MONITORING: Primary | ICD-10-CM

## 2022-05-05 DIAGNOSIS — F43.9 STRESS: ICD-10-CM

## 2022-05-05 PROCEDURE — 3008F BODY MASS INDEX DOCD: CPT | Performed by: NURSE PRACTITIONER

## 2022-05-05 PROCEDURE — 3078F DIAST BP <80 MM HG: CPT | Performed by: NURSE PRACTITIONER

## 2022-05-05 PROCEDURE — 99213 OFFICE O/P EST LOW 20 MIN: CPT | Performed by: NURSE PRACTITIONER

## 2022-05-05 PROCEDURE — 3074F SYST BP LT 130 MM HG: CPT | Performed by: NURSE PRACTITIONER

## 2022-05-05 RX ORDER — PHENTERMINE HYDROCHLORIDE 37.5 MG/1
37.5 TABLET ORAL
Qty: 90 TABLET | Refills: 0 | Status: SHIPPED | OUTPATIENT
Start: 2022-05-11

## 2022-05-05 RX ORDER — SEMAGLUTIDE 1.7 MG/.75ML
1.7 INJECTION, SOLUTION SUBCUTANEOUS WEEKLY
Qty: 9 ML | Refills: 0 | Status: SHIPPED | OUTPATIENT
Start: 2022-05-05 | End: 2022-07-22

## 2022-05-05 NOTE — PATIENT INSTRUCTIONS
We are here to support you with weight loss, but please remember that you still need your primary care provider for your routine health maintenance. PLAN:  Will continue with wegovy 1.7mg weekly and phentermine    Follow up with me in 8-12 weeks  Schedule follow up appointments: Enrrique Quinones (dietitian) or Anastasiia Lowe (presurgery dietitian)   Check for insurance coverage for dietitian and labwork prior to scheduling appointment. Please try to work on the following dietary changes:  1. Goals: Aim for 20-30 grams of protein/ meal  i. Aim for 100 grams of carbohydrates/day  ii. Eat 4-6 vegetables/day  iii. Avoid skipping meals- eat every 4-5 hours  iv. Aim for 3 meals/day  2. Drink lots of water and cut down on soda/juice consumption if soda/juice drinker  3. Focus on protein: (15-30 grams with each meal) ie. greek yogurt, cottage cheese, string cheese, hard boiled eggs  4. Healthy snacks: peanut butter and apples, hummus and carrots, berries, nuts (1/4 cup), tuna and crackers                 Protein Shakes: Premier protein or Core Power                Protein Bars: Rx Bars, Oatmega, Power Crunch                 Sargento balanced breaks (cheese and nuts)- without chocolate  5. Reduce carbohydrates which includes sweets as well as rice, pasta, potatoes, bread, corn and instead choose whole grain options or more protein or vegetables (4-6 servings of vegetables per day)  6. Get a good night of sleep  7. Try to decrease stress in life     Please download apps:  1. \"My Fitness Pal\" (other option is Lose it)) to help you to monitor daily dietary intake and you will be able to see if you are eating the right amount of calories, protein, carbs                With My Fitness Pal-->When you set-up the neyda or need to adjust settings:                Goals should include:                  Lose 1.5-2 lbs per week                Activity level: not very active (can't count exercise towards calorie number per day) ** Daily INPUT> Look at nutrition section-- \"nutrients\" and it will break down your macros for the day (ie. Protein, carbs, fibers, sugars and fats). Try to stay within these numbers daily     2. \"7 minute workout\" to help with exercise/activity which takes 7 minutes of your day and that you can do at home! 3. \"Calm\" or \"Headspace\" which helps with mindfulness, meditation, clarity, sleep, and anu to your daily life. 4. Cubiez blog for healthy recipe ideas  5. [employer] for low carb resources    HIGH PROTEIN SNACK IDEAS  -cottage cheese  -plain yogurt  -kefir  -hard-boiled eggs  -natural cheeses  -nuts (measure portion size)   -unsweetened nut butters  -dried edamame   -vincenzo seeds soaked in water or almond milk  -soy nuts  -cured meats (monitor for sodium issues)   -hummus with vegetables  -bean dip with vegetables     FRUIT  Low carb fruit options   Raspberries: Half a cup (60 grams) contains 3 grams of carbs. Blackberries: Half a cup (70 grams) contains 4 grams of carbs. Strawberries: Half a cup (100 grams) contains 6 grams of carbs. Blueberries: Half a cup (50 grams) contains 6 grams of carbs. Plum: One medium-sized (80 grams) contains 6 grams of carbs.      VEGETABLES  Low carb vegetables

## 2022-07-13 ENCOUNTER — TELEPHONE (OUTPATIENT)
Dept: INTERNAL MEDICINE CLINIC | Facility: CLINIC | Age: 52
End: 2022-07-13

## 2022-07-13 DIAGNOSIS — Z13.228 SCREENING FOR METABOLIC DISORDER: ICD-10-CM

## 2022-07-13 DIAGNOSIS — Z13.29 SCREENING FOR THYROID DISORDER: ICD-10-CM

## 2022-07-13 DIAGNOSIS — Z13.0 SCREENING FOR DISORDER OF BLOOD AND BLOOD-FORMING ORGANS: ICD-10-CM

## 2022-07-13 DIAGNOSIS — Z13.220 SCREENING FOR LIPID DISORDERS: ICD-10-CM

## 2022-07-13 DIAGNOSIS — Z00.00 ROUTINE GENERAL MEDICAL EXAMINATION AT A HEALTH CARE FACILITY: Primary | ICD-10-CM

## 2022-07-13 NOTE — TELEPHONE ENCOUNTER
Orders to [facility] Pt aware to get labs done no sooner than 2 weeks prior to the appt. Pt aware to fast.  No call back required.     Future Appointments   Date Time Provider Leora Hansen   8/10/2022  7:20 AM GERRY Wallace [facility]

## 2022-07-14 DIAGNOSIS — R74.8 ELEVATED ALKALINE PHOSPHATASE LEVEL: Primary | ICD-10-CM

## 2022-07-28 ENCOUNTER — OFFICE VISIT (OUTPATIENT)
Dept: INTERNAL MEDICINE CLINIC | Facility: CLINIC | Age: 52
End: 2022-07-28
Payer: COMMERCIAL

## 2022-07-28 VITALS
HEIGHT: 65.5 IN | HEART RATE: 90 BPM | SYSTOLIC BLOOD PRESSURE: 122 MMHG | OXYGEN SATURATION: 98 % | DIASTOLIC BLOOD PRESSURE: 84 MMHG | WEIGHT: 175 LBS | BODY MASS INDEX: 28.81 KG/M2 | RESPIRATION RATE: 16 BRPM

## 2022-07-28 DIAGNOSIS — Z51.81 ENCOUNTER FOR THERAPEUTIC DRUG MONITORING: Primary | ICD-10-CM

## 2022-07-28 DIAGNOSIS — F43.9 STRESS: ICD-10-CM

## 2022-07-28 DIAGNOSIS — E55.9 VITAMIN D DEFICIENCY: ICD-10-CM

## 2022-07-28 DIAGNOSIS — E66.9 OBESITY (BMI 30.0-34.9): ICD-10-CM

## 2022-07-28 PROCEDURE — 99213 OFFICE O/P EST LOW 20 MIN: CPT | Performed by: NURSE PRACTITIONER

## 2022-07-28 PROCEDURE — 3079F DIAST BP 80-89 MM HG: CPT | Performed by: NURSE PRACTITIONER

## 2022-07-28 PROCEDURE — 3008F BODY MASS INDEX DOCD: CPT | Performed by: NURSE PRACTITIONER

## 2022-07-28 PROCEDURE — 3074F SYST BP LT 130 MM HG: CPT | Performed by: NURSE PRACTITIONER

## 2022-07-28 RX ORDER — PHENTERMINE HYDROCHLORIDE 37.5 MG/1
37.5 TABLET ORAL
Qty: 90 TABLET | Refills: 0 | Status: SHIPPED | OUTPATIENT
Start: 2022-08-05

## 2022-07-28 NOTE — PATIENT INSTRUCTIONS
We are here to support you with weight loss, but please remember that you still need your primary care provider for your routine health maintenance. PLAN:  Will continue with phentermine and wegovy 1.7mg weekly   Follow up with me in 12 weeks  Schedule follow up appointments: Ольга Guerin (dietitian) or Nani Krause (presurgery dietitian)   Check for insurance coverage for dietitian and labwork prior to scheduling appointment. Please try to work on the following dietary changes:  1. Goals: Aim for 20-30 grams of protein/ meal  i. Aim for 100 grams of carbohydrates/day  ii. Eat 4-6 vegetables/day  iii. Avoid skipping meals- eat every 4-5 hours  iv. Aim for 3 meals/day  2. Drink lots of water and cut down on soda/juice consumption if soda/juice drinker  3. Focus on protein: (15-30 grams with each meal) ie. greek yogurt, cottage cheese, string cheese, hard boiled eggs  4. Healthy snacks: peanut butter and apples, hummus and carrots, berries, nuts (1/4 cup), tuna and crackers                 Protein Shakes: Premier protein or Core Power                Protein Bars: Rx Bars, Oatmega, Power Crunch                 Sargento balanced breaks (cheese and nuts)- without chocolate  5. Reduce carbohydrates which includes sweets as well as rice, pasta, potatoes, bread, corn and instead choose whole grain options or more protein or vegetables (4-6 servings of vegetables per day)  6. Get a good night of sleep  7. Try to decrease stress in life     Please download apps:  1. \"My Fitness Pal\" (other option is Lose it)) to help you to monitor daily dietary intake and you will be able to see if you are eating the right amount of calories, protein, carbs                With My Fitness Pal-->When you set-up the neyda or need to adjust settings:                Goals should include:                  Lose 1.5-2 lbs per week                Activity level: not very active (can't count exercise towards calorie number per day) ** Daily INPUT> Look at nutrition section-- \"nutrients\" and it will break down your macros for the day (ie. Protein, carbs, fibers, sugars and fats). Try to stay within these numbers daily     2. \"7 minute workout\" to help with exercise/activity which takes 7 minutes of your day and that you can do at home! 3. \"Calm\" or \"Headspace\" which helps with mindfulness, meditation, clarity, sleep, and anu to your daily life. 4. [employer] blog for healthy recipe ideas  5. [employer] for low carb resources    HIGH PROTEIN SNACK IDEAS  -cottage cheese  -plain yogurt  -kefir  -hard-boiled eggs  -natural cheeses  -nuts (measure portion size)   -unsweetened nut butters  -dried edamame   -vincenzo seeds soaked in water or almond milk  -soy nuts  -cured meats (monitor for sodium issues)   -hummus with vegetables  -bean dip with vegetables     FRUIT  Low carb fruit options   Raspberries: Half a cup (60 grams) contains 3 grams of carbs. Blackberries: Half a cup (70 grams) contains 4 grams of carbs. Strawberries: Half a cup (100 grams) contains 6 grams of carbs. Blueberries: Half a cup (50 grams) contains 6 grams of carbs. Plum: One medium-sized (80 grams) contains 6 grams of carbs.      VEGETABLES  Low carb vegetables

## 2022-08-03 ENCOUNTER — LAB ENCOUNTER (OUTPATIENT)
Dept: LAB | Facility: HOSPITAL | Age: 52
End: 2022-08-03
Attending: NURSE PRACTITIONER
Payer: COMMERCIAL

## 2022-08-03 DIAGNOSIS — Z13.0 SCREENING FOR DISORDER OF BLOOD AND BLOOD-FORMING ORGANS: ICD-10-CM

## 2022-08-03 DIAGNOSIS — Z00.00 ROUTINE GENERAL MEDICAL EXAMINATION AT A HEALTH CARE FACILITY: ICD-10-CM

## 2022-08-03 DIAGNOSIS — R74.8 ELEVATED ALKALINE PHOSPHATASE LEVEL: ICD-10-CM

## 2022-08-03 DIAGNOSIS — Z13.29 SCREENING FOR THYROID DISORDER: ICD-10-CM

## 2022-08-03 DIAGNOSIS — Z13.220 SCREENING FOR LIPID DISORDERS: ICD-10-CM

## 2022-08-03 DIAGNOSIS — Z13.228 SCREENING FOR METABOLIC DISORDER: ICD-10-CM

## 2022-08-03 LAB
ALBUMIN SERPL-MCNC: 3.8 G/DL (ref 3.4–5)
ALBUMIN/GLOB SERPL: 1.1 {RATIO} (ref 1–2)
ALP LIVER SERPL-CCNC: 101 U/L
ALT SERPL-CCNC: 37 U/L
ANION GAP SERPL CALC-SCNC: 6 MMOL/L (ref 0–18)
AST SERPL-CCNC: 20 U/L (ref 15–37)
BASOPHILS # BLD AUTO: 0.04 X10(3) UL (ref 0–0.2)
BASOPHILS NFR BLD AUTO: 0.7 %
BILIRUB DIRECT SERPL-MCNC: 0.2 MG/DL (ref 0–0.2)
BILIRUB SERPL-MCNC: 0.6 MG/DL (ref 0.1–2)
BUN BLD-MCNC: 10 MG/DL (ref 7–18)
CALCIUM BLD-MCNC: 9.3 MG/DL (ref 8.5–10.1)
CHLORIDE SERPL-SCNC: 106 MMOL/L (ref 98–112)
CHOLEST SERPL-MCNC: 179 MG/DL (ref ?–200)
CO2 SERPL-SCNC: 28 MMOL/L (ref 21–32)
CREAT BLD-MCNC: 0.72 MG/DL
EOSINOPHIL # BLD AUTO: 0.21 X10(3) UL (ref 0–0.7)
EOSINOPHIL NFR BLD AUTO: 3.5 %
ERYTHROCYTE [DISTWIDTH] IN BLOOD BY AUTOMATED COUNT: 11.3 %
FASTING PATIENT LIPID ANSWER: YES
FASTING STATUS PATIENT QL REPORTED: YES
GFR SERPLBLD BASED ON 1.73 SQ M-ARVRAT: 101 ML/MIN/1.73M2 (ref 60–?)
GLOBULIN PLAS-MCNC: 3.6 G/DL (ref 2.8–4.4)
GLUCOSE BLD-MCNC: 91 MG/DL (ref 70–99)
HCT VFR BLD AUTO: 41.1 %
HDLC SERPL-MCNC: 74 MG/DL (ref 40–59)
HGB BLD-MCNC: 13.7 G/DL
IMM GRANULOCYTES # BLD AUTO: 0.01 X10(3) UL (ref 0–1)
IMM GRANULOCYTES NFR BLD: 0.2 %
LDLC SERPL CALC-MCNC: 91 MG/DL (ref ?–100)
LYMPHOCYTES # BLD AUTO: 1.83 X10(3) UL (ref 1–4)
LYMPHOCYTES NFR BLD AUTO: 30.6 %
MCH RBC QN AUTO: 31.3 PG (ref 26–34)
MCHC RBC AUTO-ENTMCNC: 33.3 G/DL (ref 31–37)
MCV RBC AUTO: 93.8 FL
MONOCYTES # BLD AUTO: 0.37 X10(3) UL (ref 0.1–1)
MONOCYTES NFR BLD AUTO: 6.2 %
NEUTROPHILS # BLD AUTO: 3.53 X10 (3) UL (ref 1.5–7.7)
NEUTROPHILS # BLD AUTO: 3.53 X10(3) UL (ref 1.5–7.7)
NEUTROPHILS NFR BLD AUTO: 58.8 %
NONHDLC SERPL-MCNC: 105 MG/DL (ref ?–130)
OSMOLALITY SERPL CALC.SUM OF ELEC: 289 MOSM/KG (ref 275–295)
PLATELET # BLD AUTO: 293 10(3)UL (ref 150–450)
POTASSIUM SERPL-SCNC: 4 MMOL/L (ref 3.5–5.1)
PROT SERPL-MCNC: 7.4 G/DL (ref 6.4–8.2)
RBC # BLD AUTO: 4.38 X10(6)UL
SODIUM SERPL-SCNC: 140 MMOL/L (ref 136–145)
TRIGL SERPL-MCNC: 74 MG/DL (ref 30–149)
TSI SER-ACNC: 2.03 MIU/ML (ref 0.36–3.74)
VLDLC SERPL CALC-MCNC: 12 MG/DL (ref 0–30)
WBC # BLD AUTO: 6 X10(3) UL (ref 4–11)

## 2022-08-03 PROCEDURE — 84443 ASSAY THYROID STIM HORMONE: CPT

## 2022-08-03 PROCEDURE — 82248 BILIRUBIN DIRECT: CPT

## 2022-08-03 PROCEDURE — 80061 LIPID PANEL: CPT

## 2022-08-03 PROCEDURE — 85025 COMPLETE CBC W/AUTO DIFF WBC: CPT

## 2022-08-03 PROCEDURE — 36415 COLL VENOUS BLD VENIPUNCTURE: CPT

## 2022-08-03 PROCEDURE — 80053 COMPREHEN METABOLIC PANEL: CPT

## 2022-08-10 ENCOUNTER — OFFICE VISIT (OUTPATIENT)
Dept: INTERNAL MEDICINE CLINIC | Facility: CLINIC | Age: 52
End: 2022-08-10
Payer: COMMERCIAL

## 2022-08-10 VITALS
SYSTOLIC BLOOD PRESSURE: 122 MMHG | HEIGHT: 65.5 IN | WEIGHT: 173 LBS | BODY MASS INDEX: 28.48 KG/M2 | TEMPERATURE: 97 F | HEART RATE: 84 BPM | DIASTOLIC BLOOD PRESSURE: 78 MMHG

## 2022-08-10 DIAGNOSIS — D18.03 HEPATIC HEMANGIOMA: ICD-10-CM

## 2022-08-10 DIAGNOSIS — Z00.00 ROUTINE GENERAL MEDICAL EXAMINATION AT A HEALTH CARE FACILITY: Primary | ICD-10-CM

## 2022-08-10 DIAGNOSIS — Z12.31 ENCOUNTER FOR SCREENING MAMMOGRAM FOR MALIGNANT NEOPLASM OF BREAST: ICD-10-CM

## 2022-08-10 DIAGNOSIS — L98.9 SKIN LESION: ICD-10-CM

## 2022-08-10 PROBLEM — R74.8 ELEVATED ALKALINE PHOSPHATASE LEVEL: Status: RESOLVED | Noted: 2018-12-17 | Resolved: 2022-08-10

## 2022-08-10 PROCEDURE — 99396 PREV VISIT EST AGE 40-64: CPT | Performed by: NURSE PRACTITIONER

## 2022-08-10 PROCEDURE — 3008F BODY MASS INDEX DOCD: CPT | Performed by: NURSE PRACTITIONER

## 2022-08-10 PROCEDURE — 3078F DIAST BP <80 MM HG: CPT | Performed by: NURSE PRACTITIONER

## 2022-08-10 PROCEDURE — 3074F SYST BP LT 130 MM HG: CPT | Performed by: NURSE PRACTITIONER

## 2022-08-22 ENCOUNTER — TELEPHONE (OUTPATIENT)
Dept: SURGERY | Facility: CLINIC | Age: 52
End: 2022-08-22

## 2022-08-22 ENCOUNTER — HOSPITAL ENCOUNTER (OUTPATIENT)
Dept: ULTRASOUND IMAGING | Age: 52
Discharge: HOME | End: 2022-08-22
Attending: INTERNAL MEDICINE
Payer: COMMERCIAL

## 2022-08-22 DIAGNOSIS — R79.89 ELEVATED LIVER FUNCTION TESTS: Primary | ICD-10-CM

## 2022-08-22 DIAGNOSIS — R74.8 ELEVATED ALKALINE PHOSPHATASE LEVEL: ICD-10-CM

## 2022-08-22 PROCEDURE — 76981 USE PARENCHYMA: CPT | Performed by: INTERNAL MEDICINE

## 2022-08-22 PROCEDURE — 76705 ECHO EXAM OF ABDOMEN: CPT | Performed by: INTERNAL MEDICINE

## 2022-08-22 NOTE — TELEPHONE ENCOUNTER
Phoned patient to make her aware that ultrasound elastography of liver from today identified normal to mild fibrosis (F1). Discussed liver lesion noted more consistent focal fatty area - and is not suspicious for a malignant nodule. Most recent liver labs are normal.  Recommend follow-up in one year. Discussed options for follow-up given Dr Bandar Sheehan from [place].     GERRY Ellington  Nurse Practitioner, Hepatology  [phone] (office)

## 2022-09-12 ENCOUNTER — PATIENT MESSAGE (OUTPATIENT)
Dept: INTERNAL MEDICINE CLINIC | Facility: CLINIC | Age: 52
End: 2022-09-12

## 2022-09-13 RX ORDER — SEMAGLUTIDE 1.7 MG/.75ML
1.7 INJECTION, SOLUTION SUBCUTANEOUS WEEKLY
Qty: 9 ML | Refills: 0 | Status: SHIPPED | OUTPATIENT
Start: 2022-09-13

## 2022-09-13 NOTE — TELEPHONE ENCOUNTER
Requesting Wegovy  LOV: 7/28/22  RTC: 12 weeks  Last Relevant Labs: na  Filled: 5/5/22 #9ml with 0 refills    Future Appointments   Date Time Provider Leora Hansen   10/27/2022  8:40 AM GERRY Mitsry [id number]

## 2022-10-19 ENCOUNTER — HOSPITAL ENCOUNTER (OUTPATIENT)
Dept: MAMMOGRAPHY | Facility: HOSPITAL | Age: 52
Discharge: HOME | End: 2022-10-19
Attending: NURSE PRACTITIONER
Payer: COMMERCIAL

## 2022-10-19 DIAGNOSIS — Z12.31 ENCOUNTER FOR SCREENING MAMMOGRAM FOR MALIGNANT NEOPLASM OF BREAST: ICD-10-CM

## 2022-10-19 PROCEDURE — 77067 SCR MAMMO BI INCL CAD: CPT | Performed by: NURSE PRACTITIONER

## 2022-10-19 PROCEDURE — 77063 BREAST TOMOSYNTHESIS BI: CPT | Performed by: NURSE PRACTITIONER

## 2022-10-27 ENCOUNTER — OFFICE VISIT (OUTPATIENT)
Dept: INTERNAL MEDICINE CLINIC | Facility: CLINIC | Age: 52
End: 2022-10-27
Payer: COMMERCIAL

## 2022-10-27 VITALS
DIASTOLIC BLOOD PRESSURE: 76 MMHG | RESPIRATION RATE: 16 BRPM | HEART RATE: 78 BPM | SYSTOLIC BLOOD PRESSURE: 122 MMHG | BODY MASS INDEX: 27.16 KG/M2 | HEIGHT: 65.5 IN | WEIGHT: 165 LBS

## 2022-10-27 DIAGNOSIS — Z51.81 ENCOUNTER FOR THERAPEUTIC DRUG MONITORING: Primary | ICD-10-CM

## 2022-10-27 DIAGNOSIS — F43.9 STRESS: ICD-10-CM

## 2022-10-27 DIAGNOSIS — E66.9 OBESITY (BMI 30.0-34.9): ICD-10-CM

## 2022-10-27 DIAGNOSIS — E55.9 VITAMIN D DEFICIENCY: ICD-10-CM

## 2022-10-27 PROCEDURE — 3074F SYST BP LT 130 MM HG: CPT | Performed by: NURSE PRACTITIONER

## 2022-10-27 PROCEDURE — 99213 OFFICE O/P EST LOW 20 MIN: CPT | Performed by: NURSE PRACTITIONER

## 2022-10-27 PROCEDURE — 3078F DIAST BP <80 MM HG: CPT | Performed by: NURSE PRACTITIONER

## 2022-10-27 PROCEDURE — 3008F BODY MASS INDEX DOCD: CPT | Performed by: NURSE PRACTITIONER

## 2022-10-27 RX ORDER — PHENTERMINE HYDROCHLORIDE 37.5 MG/1
37.5 TABLET ORAL
Qty: 90 TABLET | Refills: 0 | Status: SHIPPED | OUTPATIENT
Start: 2022-10-27

## 2022-10-27 NOTE — PATIENT INSTRUCTIONS
Next steps:  1. Fill your prescribed medication and take as discussed and prescribed: wegovy 1.7mg weekly and phentermine   2. Schedule a personal nutrition consultation with one of our registered dieticians     Please try to work on the following dietary changes:  1. Drink water with meals and throughout the day, cut down on soda and/or juice if consumed. Consider flavored water options like Bubbly, Spindrift, Hint and Emily. 2.  Eat breakfast daily and focus on having protein with each meal, examples include: greek yogurt, cottage cheese, hard boiled egg, whole grain toast with peanut butter. 3.  Reduce refined carbohydrates and sugars which includes items such as sweets, as well as rice, pasta, and bread and make sure to choose whole grain options when having them with just 1 serving per meal about the size of your inner palm. 4.  Consume non starchy veggies daily working towards making them a good 50% of your daily food intake. Add them to lunch and dinner consistently. 5.  Start a daily probiotic: VSL#3 is recommended, (order on line at www.vsl3. com). Take 1 capsule daily with water for 30 days, then reduce to 1 every other day (this will reduce the cost). Capsules can be left out for 2 weeks, but then must be refrigerated. Please download neyda My Fitness Mahi Scot! Or Net Diary to monitor daily dietary intake and you will be able to see if you are eating the right amount of calories or too much or too little which would hinder weight loss. Additionally this will help to see your daily carbohydrate and protein intake. When you set the neyda up choose 1-2 lbs/week as a goal.  Keeping a paper food journal is an option as well to remain accountable for your choices- this is the start to mindful eating! A low calorie diet has been consistently shown to support weight loss. Continue or start exercising to help establish a routine.  If not already exercising begin with 1 day and progress as able with long-term goal of 30 minutes 5 days a week at a minimum. Meditation daily can help manage and control stress. Chronic stress can make weight loss difficult. Exercising is one way to help with stress, but meditation using the CALM Benji or another comparable alternative can be done in your home or place of work with little time commitment. This Benji can also help work on behavior change and improve sleep. Check out the segment under Calm Masterclass and listen to The 4 Pillars of Health. A great way to begin learning about the foundation of lifestyle with practical tips to use in your every day. Check out www.yourweightmatters. org blog for continued daily support and education along this weight loss journey! Patient Resources:     Personal Training/Fitness Classes/Health Coaching     Carmelita 112 and Gibson Sophiaside @ [email or web address]/ Full fitness center with group fitness and personal training. Discount available as client of [facility]. Health Coaching and Personal Training with Jennifer Merissa at our [facility]- individual weekly coaching with option to add personal training and small group fitness classes targeted at weight loss- [phone] and/or email @ [email or web address]  360FIT [place] [email or web address] Group Fitness [phone] and/or email Hilda Barrera at [email or web address]  [address] [facility] with multiple locations: [employer]etna (www.Cloakroom. [employer]), Eat The [employer] Fitness (www.Space Race. [employer]), Fit Body Bootcamp (www.Railroad Empirebodybootcamp.[employer]), Casetext (www.Clickable. [employer]), The Exercise  (www.exercisecoach.[employer])     Online Fitness  Fitness  on [employer] in 10 DVD series- www. bxkzc22TNZ. [employer]  Sit and Be Fit - Chair exercise series Www.sitandbefit. org  Hip Hop Fit with Mike Cleaning at www.hiphopfit. net     Apps for on the [employer] 7 Minute Workout (orange box with white 7) - free on the go HIIT training benji  Peloton Benji @ www. Entertainment Media Works     Nutrition Trackers and Tools  LoseIT! And My Fitness Pal apps and on line for tracking nutrition  NOOM - virtual health coaching  FitFoundation (healthy meals on the go) in [facility] @ www. zmyawwiezwgmy9p. Dorota Gaming MD @ www.LumexistromdIsomark and Vishal Ballesteros (keto and low carb plans recommended) @ www. OCIWIS94.YRM, Metabolic Meals @ www. SaphoMetabolicMeals. com - individual prepared meals to go  Panoratio, bubl, [employer], Every Plate, Third Age- on line meal delivery programs for preparation at home  AK Groupspeak in [place] for homemade meals to go @ wwwmmCHANNEL  Diet Doctor @ www. dietdoctor. QuantumSphere - low carb swaps  YuMerfac - meal prep and planning benji (www.yummly. com)     Stress Management/Behavior/Mindful Eating  CALM meditation benji (www.Lambda Solutions)  Headspace  Am I Hungry? Mindful eating virtual  benji  Www.yourweightmatters. org - Obesity Action Coalition sponsored Blog posts daily  Motivation benji (black box with white \")- daily supportive messages sent to your phone     Books/Video Education/Podcasts  Mindless Eating by Marilynn Cockayne  Why We Get Sick by Jairo Arias (a book about insulin resistance)  Atomic Habits by Romana Ask (a book about taking small steps to promote greater behavior change)   Can't Hurt Me by Kat Taylor (a book exploring the power of discipline in achieving your goals)  The End of Dieting: How to Live for Life by Dr. Arturo Coreas M.D. or listen to The [address] [facility] [address] Episode 61: Understanding \"Nutritarian\" Eating w/Dr. Arturo Coreas  Your Body in Balance: The [employer] of Food, Hormones, and Health by Dr. Meghan Bey  The Menopause Diet Plan by Perlita Lopes and [facility]  The Complete Guide to fasting by Dr. José Callahan, [address] [facility] by Eulogio Melton, Ph.D, R.D.   Weight Loss Surgery Will Not Treat Food Addiction by Dianne Garay Ph.D  The Game Changers- Ree Documentary on plant based nutrition  Fed Up - documentary about obesity (Free on Utube)  The Truth About Sugar - documentary on sugar (Free on Utube, https://youtu. be/5J0vvqhGX1c)  The Dr. Srinivasa Villanueva by Dr. Sakina Buenrostro MD  Fitlosophy Fitspiration - journal to better health (found at [employer] in fitness aisle)  What Happened to You?- a look at the impact trauma has on behavior written by Filemon Sevilla and Dr. Gaines Carrier Again by Fay Carcamo - discovering your true self after trauma  Elvia Johnson talk on [employer], The Call to Kanmu  Podcasts: The Exam Room by the Physician's Committee, Nutrition Facts by Dr. Raymond Van    We are here to support you with weight loss, but please remember that you still need your primary care provider for your routine health maintenance.

## 2022-10-31 ENCOUNTER — HOSPITAL ENCOUNTER (OUTPATIENT)
Dept: MAMMOGRAPHY | Facility: HOSPITAL | Age: 52
Discharge: HOME | End: 2022-10-31
Attending: NURSE PRACTITIONER
Payer: COMMERCIAL

## 2022-10-31 DIAGNOSIS — R92.2 INCONCLUSIVE MAMMOGRAM: ICD-10-CM

## 2022-10-31 PROCEDURE — 76642 ULTRASOUND BREAST LIMITED: CPT | Performed by: NURSE PRACTITIONER

## 2022-10-31 PROCEDURE — 77061 BREAST TOMOSYNTHESIS UNI: CPT | Performed by: NURSE PRACTITIONER

## 2022-10-31 PROCEDURE — 77065 DX MAMMO INCL CAD UNI: CPT | Performed by: NURSE PRACTITIONER

## 2022-11-03 ENCOUNTER — HOSPITAL ENCOUNTER (OUTPATIENT)
Dept: MAMMOGRAPHY | Facility: HOSPITAL | Age: 52
Discharge: HOME | End: 2022-11-03
Attending: NURSE PRACTITIONER
Payer: COMMERCIAL

## 2022-11-03 DIAGNOSIS — R92.1 BREAST CALCIFICATIONS: ICD-10-CM

## 2022-11-03 PROCEDURE — 19081 BX BREAST 1ST LESION STRTCTC: CPT | Performed by: NURSE PRACTITIONER

## 2022-11-03 PROCEDURE — 88305 TISSUE EXAM BY PATHOLOGIST: CPT | Performed by: NURSE PRACTITIONER

## 2022-11-08 ENCOUNTER — TELEPHONE (OUTPATIENT)
Dept: MAMMOGRAPHY | Facility: HOSPITAL | Age: 52
End: 2022-11-08

## 2022-11-08 NOTE — TELEPHONE ENCOUNTER
Telephoned Do Greene and name,  verified with patient. Notified Do Meyeryal of right breast negative for malignancy but positive for radial scar and papilloma biopsy result. Concordance verified by radiologist, Dr. Luis Alberto Álvarez. Do Meyeryal reports biopsy site is healing well. Radiologist recommends surgical consultation and additional right breast 1 site stereotactic biopsy prior to surgical consultation. This was discussed with the patient and Dr Luis Alberto Álvarez will also reach out to the patient to discuss. Patient denies blood thinners, bleeding disorders, liver disease, and chemo. Patient declined review of biopsy pre and post procedure instructions at this time. Norman GARRETT's office is referring patient to Dr Alphonso Romero. Patient was provided with the contact information for Dr Alphonso Romero and instructed to call for a consultation appt. Pt verbalized understanding and had no further questions at this time.

## 2022-11-14 ENCOUNTER — OFFICE VISIT (OUTPATIENT)
Facility: LOCATION | Age: 52
End: 2022-11-14
Payer: COMMERCIAL

## 2022-11-14 VITALS — HEART RATE: 114 BPM | TEMPERATURE: 98 F

## 2022-11-14 DIAGNOSIS — N64.89 RADIAL SCAR OF RIGHT BREAST: Primary | ICD-10-CM

## 2022-11-14 DIAGNOSIS — D24.1 PAPILLOMA OF RIGHT BREAST: ICD-10-CM

## 2022-11-14 PROCEDURE — 99243 OFF/OP CNSLTJ NEW/EST LOW 30: CPT | Performed by: SURGERY

## 2022-11-21 ENCOUNTER — IMMUNIZATION (OUTPATIENT)
Dept: LAB | Facility: HOSPITAL | Age: 52
End: 2022-11-21
Attending: PREVENTIVE MEDICINE
Payer: COMMERCIAL

## 2022-11-21 DIAGNOSIS — Z23 NEED FOR VACCINATION: Primary | ICD-10-CM

## 2022-11-21 DIAGNOSIS — N64.89 RADIAL SCAR OF LEFT BREAST: Primary | ICD-10-CM

## 2022-11-21 DIAGNOSIS — R92.0 MICROCALCIFICATION OF RIGHT BREAST ON MAMMOGRAPHY: ICD-10-CM

## 2022-11-21 DIAGNOSIS — N64.89 RADIAL SCAR OF RIGHT BREAST: ICD-10-CM

## 2022-11-21 PROCEDURE — 90471 IMMUNIZATION ADMIN: CPT

## 2022-11-22 ENCOUNTER — TELEPHONE (OUTPATIENT)
Dept: GENERAL RADIOLOGY | Facility: HOSPITAL | Age: 52
End: 2022-11-22

## 2022-11-22 NOTE — TELEPHONE ENCOUNTER
56: Spoke with Barbie Lockwood. Discussed localization procedure to be [facility] prior to surgery on Thursday, December 15. Procedure and flow of the day reviewed. Ms. Anitra Briggs verbalized understanding. No questions at this time. Encouraged Ms. Anitra Briggs to contact Dr. Bambi Salas or the breast center if she has questions/concerns prior to surgery date. Barbie Lockwood verbalized agreement and gratitude for the call.

## 2022-12-01 ENCOUNTER — TELEPHONE (OUTPATIENT)
Facility: LOCATION | Age: 52
End: 2022-12-01

## 2022-12-01 NOTE — TELEPHONE ENCOUNTER
Initiated authorization for right Breast Biopsy w/ Xray localization and right Breast Lumpectomy CPT [id number] with [employer] online  Status: Approved-authorization is not required per health plan

## 2022-12-14 NOTE — IMAGING NOTE
Assisted  with mammography guided needle localization of the right breast x 2 sites. Israel Newman identified with spelling of name and date of birth. Medications and allergies reviewed. NKDA reported. History:     Microcalcification of right breast on mammography   Radial scar of right breast   Surgery: RIGHT BREAST BIOPSY WITH X-RAY LOCALIZATION AT 6 O CLOCK AND RIGHT BREAST LUMPECTOMY AT 11 O CLOCK    Order verified. Procedure explained and questions answered. Israel Newman verbalized understanding and agreement. 1025: Written consent obtained by imaging staff. .     1033: Scans taken by STEPHANI BORGES- mammography technologist    1035: Dr. Griselda White  present    [phone]: Time out complete    [phone]: Site prepped in a sterile manner. Site #1  1037: Lidocaine administered for anesthetic affect. 1038: Henderson 20G x 10cm needle placed- right breast area of calcifications    Site #2  1098: Lidocaine administered for anesthetic affect. 1040: Henderson 20G x 7.5cm needle placed- right breast marked by biopsy clip    Emotional support provided. Asad Chilel tolerated procedure well. Site cleaned. Wires secured with sterile 4x4 gauze dressing, Transpore tape, and a styrofoam cup. Israel Newman transported via wheelchair to pre-op/surgery holding in stable condition. Ms. Escobar Falling without complaints or concerns at this time.

## 2022-12-15 ENCOUNTER — HOSPITAL ENCOUNTER (OUTPATIENT)
Dept: MAMMOGRAPHY | Facility: HOSPITAL | Age: 52
Discharge: HOME | End: 2022-12-15
Attending: SURGERY
Payer: COMMERCIAL

## 2022-12-15 ENCOUNTER — ANESTHESIA (OUTPATIENT)
Dept: SURGERY | Facility: HOSPITAL | Age: 52
End: 2022-12-15
Payer: COMMERCIAL

## 2022-12-15 ENCOUNTER — HOSPITAL ENCOUNTER (OUTPATIENT)
Facility: HOSPITAL | Age: 52
Setting detail: HOSPITAL OUTPATIENT SURGERY
Discharge: HOME | End: 2022-12-15
Attending: SURGERY | Admitting: SURGERY
Payer: COMMERCIAL

## 2022-12-15 ENCOUNTER — ANESTHESIA EVENT (OUTPATIENT)
Dept: SURGERY | Facility: HOSPITAL | Age: 52
End: 2022-12-15
Payer: COMMERCIAL

## 2022-12-15 VITALS
RESPIRATION RATE: 16 BRPM | HEIGHT: 66 IN | WEIGHT: 163.63 LBS | DIASTOLIC BLOOD PRESSURE: 74 MMHG | TEMPERATURE: 97 F | HEART RATE: 72 BPM | BODY MASS INDEX: 26.3 KG/M2 | OXYGEN SATURATION: 96 % | SYSTOLIC BLOOD PRESSURE: 111 MMHG

## 2022-12-15 DIAGNOSIS — R92.0 MICROCALCIFICATION OF RIGHT BREAST ON MAMMOGRAPHY: ICD-10-CM

## 2022-12-15 DIAGNOSIS — N64.89 RADIAL SCAR OF RIGHT BREAST: ICD-10-CM

## 2022-12-15 PROCEDURE — 0HBT0ZX EXCISION OF RIGHT BREAST, OPEN APPROACH, DIAGNOSTIC: ICD-10-PCS | Performed by: SURGERY

## 2022-12-15 PROCEDURE — 19281 PERQ DEVICE BREAST 1ST IMAG: CPT | Performed by: SURGERY

## 2022-12-15 PROCEDURE — 19282 PERQ DEVICE BREAST EA IMAG: CPT | Performed by: SURGERY

## 2022-12-15 PROCEDURE — BH00ZZZ PLAIN RADIOGRAPHY OF RIGHT BREAST: ICD-10-PCS | Performed by: RADIOLOGY

## 2022-12-15 PROCEDURE — 76098 X-RAY EXAM SURGICAL SPECIMEN: CPT | Performed by: SURGERY

## 2022-12-15 RX ORDER — SODIUM CHLORIDE, SODIUM LACTATE, POTASSIUM CHLORIDE, CALCIUM CHLORIDE 600; 310; 30; 20 MG/100ML; MG/100ML; MG/100ML; MG/100ML
INJECTION, SOLUTION INTRAVENOUS CONTINUOUS
Status: DISCONTINUED | OUTPATIENT
Start: 2022-12-15 | End: 2022-12-15

## 2022-12-15 RX ORDER — HYDROMORPHONE HYDROCHLORIDE 1 MG/ML
0.4 INJECTION, SOLUTION INTRAMUSCULAR; INTRAVENOUS; SUBCUTANEOUS EVERY 5 MIN PRN
Status: DISCONTINUED | OUTPATIENT
Start: 2022-12-15 | End: 2022-12-15

## 2022-12-15 RX ORDER — ACETAMINOPHEN 500 MG
1000 TABLET ORAL ONCE
Status: DISCONTINUED | OUTPATIENT
Start: 2022-12-15 | End: 2022-12-15 | Stop reason: HOSPADM

## 2022-12-15 RX ORDER — HYDROMORPHONE HYDROCHLORIDE 1 MG/ML
0.6 INJECTION, SOLUTION INTRAMUSCULAR; INTRAVENOUS; SUBCUTANEOUS EVERY 5 MIN PRN
Status: DISCONTINUED | OUTPATIENT
Start: 2022-12-15 | End: 2022-12-15

## 2022-12-15 RX ORDER — LIDOCAINE HYDROCHLORIDE 10 MG/ML
INJECTION, SOLUTION INFILTRATION; PERINEURAL AS NEEDED
Status: DISCONTINUED | OUTPATIENT
Start: 2022-12-15 | End: 2022-12-15 | Stop reason: HOSPADM

## 2022-12-15 RX ORDER — SCOLOPAMINE TRANSDERMAL SYSTEM 1 MG/1
1 PATCH, EXTENDED RELEASE TRANSDERMAL ONCE
Status: DISCONTINUED | OUTPATIENT
Start: 2022-12-15 | End: 2022-12-15 | Stop reason: HOSPADM

## 2022-12-15 RX ORDER — HYDROCODONE BITARTRATE AND ACETAMINOPHEN 5; 325 MG/1; MG/1
2 TABLET ORAL ONCE AS NEEDED
Status: DISCONTINUED | OUTPATIENT
Start: 2022-12-15 | End: 2022-12-15

## 2022-12-15 RX ORDER — DIPHENHYDRAMINE HYDROCHLORIDE 50 MG/ML
12.5 INJECTION INTRAMUSCULAR; INTRAVENOUS AS NEEDED
Status: DISCONTINUED | OUTPATIENT
Start: 2022-12-15 | End: 2022-12-15

## 2022-12-15 RX ORDER — PROCHLORPERAZINE EDISYLATE 5 MG/ML
5 INJECTION INTRAMUSCULAR; INTRAVENOUS EVERY 8 HOURS PRN
Status: DISCONTINUED | OUTPATIENT
Start: 2022-12-15 | End: 2022-12-15

## 2022-12-15 RX ORDER — ONDANSETRON 2 MG/ML
INJECTION INTRAMUSCULAR; INTRAVENOUS AS NEEDED
Status: DISCONTINUED | OUTPATIENT
Start: 2022-12-15 | End: 2022-12-15 | Stop reason: SURG

## 2022-12-15 RX ORDER — ACETAMINOPHEN 500 MG
1000 TABLET ORAL ONCE AS NEEDED
Status: DISCONTINUED | OUTPATIENT
Start: 2022-12-15 | End: 2022-12-15

## 2022-12-15 RX ORDER — CEFAZOLIN SODIUM/WATER 2 G/20 ML
SYRINGE (ML) INTRAVENOUS
Status: DISCONTINUED
Start: 2022-12-15 | End: 2022-12-15

## 2022-12-15 RX ORDER — HYDROCODONE BITARTRATE AND ACETAMINOPHEN 5; 325 MG/1; MG/1
1 TABLET ORAL EVERY 6 HOURS PRN
Qty: 10 TABLET | Refills: 0 | Status: SHIPPED | OUTPATIENT
Start: 2022-12-15

## 2022-12-15 RX ORDER — HYDROMORPHONE HYDROCHLORIDE 1 MG/ML
0.2 INJECTION, SOLUTION INTRAMUSCULAR; INTRAVENOUS; SUBCUTANEOUS EVERY 5 MIN PRN
Status: DISCONTINUED | OUTPATIENT
Start: 2022-12-15 | End: 2022-12-15

## 2022-12-15 RX ORDER — CEFAZOLIN SODIUM/WATER 2 G/20 ML
2 SYRINGE (ML) INTRAVENOUS ONCE
Status: COMPLETED | OUTPATIENT
Start: 2022-12-15 | End: 2022-12-15

## 2022-12-15 RX ORDER — LABETALOL HYDROCHLORIDE 5 MG/ML
5 INJECTION, SOLUTION INTRAVENOUS EVERY 5 MIN PRN
Status: DISCONTINUED | OUTPATIENT
Start: 2022-12-15 | End: 2022-12-15

## 2022-12-15 RX ORDER — BUPIVACAINE HYDROCHLORIDE AND EPINEPHRINE 5; 5 MG/ML; UG/ML
INJECTION, SOLUTION EPIDURAL; INTRACAUDAL; PERINEURAL AS NEEDED
Status: DISCONTINUED | OUTPATIENT
Start: 2022-12-15 | End: 2022-12-15 | Stop reason: HOSPADM

## 2022-12-15 RX ORDER — HYDROCODONE BITARTRATE AND ACETAMINOPHEN 5; 325 MG/1; MG/1
1 TABLET ORAL ONCE AS NEEDED
Status: DISCONTINUED | OUTPATIENT
Start: 2022-12-15 | End: 2022-12-15

## 2022-12-15 RX ORDER — MIDAZOLAM HYDROCHLORIDE 1 MG/ML
1 INJECTION INTRAMUSCULAR; INTRAVENOUS EVERY 5 MIN PRN
Status: DISCONTINUED | OUTPATIENT
Start: 2022-12-15 | End: 2022-12-15

## 2022-12-15 RX ORDER — NALOXONE HYDROCHLORIDE 0.4 MG/ML
80 INJECTION, SOLUTION INTRAMUSCULAR; INTRAVENOUS; SUBCUTANEOUS AS NEEDED
Status: DISCONTINUED | OUTPATIENT
Start: 2022-12-15 | End: 2022-12-15

## 2022-12-15 RX ORDER — DIAZEPAM 5 MG/1
5 TABLET ORAL AS NEEDED
Status: DISCONTINUED | OUTPATIENT
Start: 2022-12-15 | End: 2022-12-15 | Stop reason: HOSPADM

## 2022-12-15 RX ORDER — ONDANSETRON 2 MG/ML
4 INJECTION INTRAMUSCULAR; INTRAVENOUS EVERY 6 HOURS PRN
Status: DISCONTINUED | OUTPATIENT
Start: 2022-12-15 | End: 2022-12-15

## 2022-12-15 RX ORDER — LIDOCAINE HYDROCHLORIDE 10 MG/ML
INJECTION, SOLUTION EPIDURAL; INFILTRATION; INTRACAUDAL; PERINEURAL AS NEEDED
Status: DISCONTINUED | OUTPATIENT
Start: 2022-12-15 | End: 2022-12-15 | Stop reason: SURG

## 2022-12-15 RX ORDER — DEXAMETHASONE SODIUM PHOSPHATE 4 MG/ML
VIAL (ML) INJECTION AS NEEDED
Status: DISCONTINUED | OUTPATIENT
Start: 2022-12-15 | End: 2022-12-15 | Stop reason: SURG

## 2022-12-15 RX ORDER — KETAMINE HYDROCHLORIDE 50 MG/ML
INJECTION, SOLUTION, CONCENTRATE INTRAMUSCULAR; INTRAVENOUS AS NEEDED
Status: DISCONTINUED | OUTPATIENT
Start: 2022-12-15 | End: 2022-12-15 | Stop reason: SURG

## 2022-12-15 RX ADMIN — KETAMINE HYDROCHLORIDE 15 MG: 50 INJECTION, SOLUTION, CONCENTRATE INTRAMUSCULAR; INTRAVENOUS at 16:04:00

## 2022-12-15 RX ADMIN — ONDANSETRON 4 MG: 2 INJECTION INTRAMUSCULAR; INTRAVENOUS at 16:25:00

## 2022-12-15 RX ADMIN — CEFAZOLIN SODIUM/WATER 2 G: 2 G/20 ML SYRINGE (ML) INTRAVENOUS at 16:10:00

## 2022-12-15 RX ADMIN — DEXAMETHASONE SODIUM PHOSPHATE 4 MG: 4 MG/ML VIAL (ML) INJECTION at 16:25:00

## 2022-12-15 RX ADMIN — SODIUM CHLORIDE, SODIUM LACTATE, POTASSIUM CHLORIDE, CALCIUM CHLORIDE: 600; 310; 30; 20 INJECTION, SOLUTION INTRAVENOUS at 16:00:00

## 2022-12-15 RX ADMIN — LIDOCAINE HYDROCHLORIDE 50 MG: 10 INJECTION, SOLUTION EPIDURAL; INFILTRATION; INTRACAUDAL; PERINEURAL at 16:04:00

## 2022-12-15 NOTE — DISCHARGE INSTRUCTIONS
Home Care Instructions for Breast Surgery  Dr. oRsa Collado  For post-operative pain control the mediations are usually over the counter preparations such as Advil and Tylenol. For severe pain the patient may take the prescribed Norco or Tylenol #3, which are narcotic pain medications. The patient may also overlap Advil with Tylenol, Tylenol #3 or Norco.  The patient can do this by taking two Tylenol, then three hours later taking two Advil, then three hours later taking Tylenol again. Please ask your surgeon before resuming blood thinners such as aspirin, Plavix or Coumadin. All other home medications may be resumed as scheduled. Generally, aspirin is avoided for ten days. DIET  The patient may resume a general diet immediately. There should be no alcohol consumption in the immediate recover time period. If the patient was sedated for the procedure the first meal should be light. WOUND CARE  The top dressings may be removed the day after surgery. This includes the gauze, tape and band-aids if they are present. Do not remove the steri-strips or butterfly tapes that are white and adherent to the skin. The steri-strips will eventually peel up at the ends and at this point they may be removed. This is usually seven to ten days after surgery. The patient may shower the day after surgery. There is no need to cover the incisions and all top gauze type dressings should be removed prior to showering. Soap can get on the wounds but do not scrub over the wounds. No hair dye or chemicals of any kind should get in the wounds. Most wounds will be closed with dissolving suture underneath the skin. These sutures will dissolve on their own. ACTIVITY  The patient may ride in a car but should not drive the car for at least overnight if sedation was used. If no sedation was used the patient may drive immediately. The patient may return to work the next day.   Avoid any activity that could lead to the breast getting elbowed or bumped. Avoid bending, pushing, pulling and lifting anything heavier than 25 pounds for two weeks. No jogging or workouts for two weeks. Fast walking and using a treadmill at less than 3.5 miles per hour in the flat position is acceptable. Patients should seek further activity limits at the time of their appointment. Patients should wear a supportive bra, even at night, for two weeks. The best support is with a sports bra. No golfing, tennis, racquetball, volleyball, or extreme sports for two weeks. APPOINTMENT  Please call the office to set up an appointment in 5-7 days after surgery. This is when the pathology results should be available. If the wound turns red, hot, swollen, becomes increasingly painful, or drains pus call us immediately at [phone]. Bleeding requiring a return to the operating room happens two to three percent of the time. Minor bleeding from the incision is expected. A significant bruise can also be expected. Severe swelling of the breast with pain, bluish discoloration, or the passage of blood clots through the incision is an indication for bleeding. The number listed above is our office number. Our phone automatically switches to our answering service if we are not there. For non-emergent care please call our office at 8:30 a.m. Monday through Friday. For emergencies please call us at any time. Thank you for entrusting us with your care.   [facility]--General Surgery

## 2022-12-15 NOTE — OPERATIVE REPORT
[facility]  Op Note    Jovan Alejo Location: OR   CSN [id number] MRN [id number]   Admission Date 12/15/2022 Operation Date 12/15/2022   Attending Physician Steph Swift MD Operating Physician Rema Oconnell MD     DATE OF OPERATION:  12/15/2022    PREOPERATIVE DIAGNOSIS:  1. Right breast radial scar with intraductal papilloma (11:00)  2. Right breast microcalcifications (6:00)    POSTOPERATIVE DIAGNOSIS:  1. Right breast radial scar with intraductal papilloma (11:00)  2. Right breast microcalcifications (6:00)    PROCEDURE PERFORMED:  1. X-ray localized right breast lumpectomy (11:00)  2. X-ray localized right breast excisional biopsy (6:00)    SURGEON:  Rema Oconnell M.D.    ASSISTANT: Angie Arias PA-C (Her assistance was required to help retract the tissue for adequate dissection and resection of the appropriate tissue. She also helped with wound closure.)    ANESTHESIA:  MAC    SPECIMEN: Right breast tissue to Pathology. ESTIMATED BLOOD LOSS: 3 cc    COMPLICATIONS: None. INDICATIONS: The patient is a 28-year-old female who was found to have microcalcifications of the right breast.  Biopsy revealed radial scar with intraductal papilloma. Postbiopsy films, however, demonstrated that a different site have been biopsied and the original site of microcalcifications persisted. Because of the risk of upstaging, she was offered excision of both areas. The risks, benefits, alternatives were discussed in detail with the patient. Risks included, but were not limited to, seroma development, infection and bleeding. We also discussed the possibility of upstaging of her pathology which would require surgery on another day. She was agreeable to proceed with the operation. PROCEDURE: After informed consent was obtained, the patient was taken to the radiology suite where 2 wires were placed to localize her breast lesions.  She was then brought up to the operating room where anesthesia was induced. The patient's right breast was prepped and draped in the usual sterile fashion. The tissue was locally anesthetized with 0.5% Marcaine with epinephrine. An incision was made between the wire insertion sites with a 15 blade scalpel. Cautery was used to obtain hemostasis. The tissue that encircled the wires was then grasped. Metzenbaums were used to sharply dissect this tissue free. Both sites were resected as 1 specimen. The specimen was marked long stitch lateral, short stitch superior. Faxitron image revealed the clip and calcifications within the tissue. Cautery was then used to obtain hemostasis. The wound was irrigated with normal saline. The deep tissue was reapproximated with 3-0 Vicryl suture. The skin incision was then closed in 2 layers, using a 3-0 Vicryl in the deep layer and a 4-0 Vicryl in the subcuticular skin. Steri-Strips were placed across the incision as well as a sterile dressing. The patient tolerated the procedure well and was transferred to the PACU in good condition.      Inocente Thomas MD

## 2022-12-21 ENCOUNTER — OFFICE VISIT (OUTPATIENT)
Facility: LOCATION | Age: 52
End: 2022-12-21

## 2022-12-21 VITALS — HEART RATE: 103 BPM | TEMPERATURE: 97 F

## 2022-12-21 DIAGNOSIS — D24.1 PAPILLOMA OF RIGHT BREAST: Primary | ICD-10-CM

## 2022-12-21 DIAGNOSIS — N64.89 RADIAL SCAR OF RIGHT BREAST: ICD-10-CM

## 2022-12-21 DIAGNOSIS — R92.0 MICROCALCIFICATION OF RIGHT BREAST ON MAMMOGRAM: ICD-10-CM

## 2022-12-22 ENCOUNTER — TELEPHONE (OUTPATIENT)
Facility: LOCATION | Age: 52
End: 2022-12-22

## 2022-12-22 NOTE — TELEPHONE ENCOUNTER
I called the patient with her pathology results. Happily, the pathology demonstrates a radial scar. I have asked her to undergo a right breast mammogram in 6 months. I will place the order and send her a reminder.

## 2022-12-26 ENCOUNTER — TELEPHONE (OUTPATIENT)
Facility: LOCATION | Age: 52
End: 2022-12-26

## 2022-12-26 DIAGNOSIS — N64.89 RADIAL SCAR OF BREAST: Primary | ICD-10-CM

## 2022-12-26 NOTE — TELEPHONE ENCOUNTER
----- Message from Kerwin Copeland MD sent at 12/22/2022  2:23 PM CST -----  Please order a diagnostic right breast mammogram to be done in 6 months (Dx: radial scar). Please also put her in the recall system for a mammogram and exam in 6 months. Thanks!   Katheryn

## 2022-12-26 NOTE — TELEPHONE ENCOUNTER
Placed mammogram order and recall for 6 months per Dr. Jonny Neely.      Orders Placed This Encounter      [facility] BLADIMIR 2D+3D DIAGNOSTIC KISHAN RIGHT (CPT=77065/40545)

## 2022-12-31 RX ORDER — SEMAGLUTIDE 1.7 MG/.75ML
1.7 INJECTION, SOLUTION SUBCUTANEOUS WEEKLY
Qty: 9 ML | Refills: 0 | Status: SHIPPED | OUTPATIENT
Start: 2022-12-31

## 2023-02-17 DIAGNOSIS — Z51.81 ENCOUNTER FOR THERAPEUTIC DRUG MONITORING: ICD-10-CM

## 2023-02-17 DIAGNOSIS — E66.9 OBESITY (BMI 30.0-34.9): ICD-10-CM

## 2023-02-17 RX ORDER — SEMAGLUTIDE 1.7 MG/.75ML
1.7 INJECTION, SOLUTION SUBCUTANEOUS WEEKLY
Qty: 9 ML | Refills: 0 | Status: CANCELLED | OUTPATIENT
Start: 2023-02-17

## 2023-02-17 RX ORDER — PHENTERMINE HYDROCHLORIDE 37.5 MG/1
37.5 TABLET ORAL
Qty: 90 TABLET | Refills: 0 | Status: SHIPPED | OUTPATIENT
Start: 2023-02-17

## 2023-02-20 ENCOUNTER — TELEPHONE (OUTPATIENT)
Dept: INTERNAL MEDICINE CLINIC | Facility: CLINIC | Age: 53
End: 2023-02-20

## 2023-02-20 NOTE — TELEPHONE ENCOUNTER
Fax received from [facility] stating phentermine 37.5 mg tabs on long term back order. I called to see if we can okay capsules. I called and spoke to Lázaro Ricks they do not have 37.5 mg capsules either. They have 30 mg dose and 15 mg. Do you want to change her?

## 2023-02-21 RX ORDER — PHENTERMINE HYDROCHLORIDE 30 MG/1
30 CAPSULE ORAL EVERY MORNING
Qty: 90 CAPSULE | Refills: 0 | Status: SHIPPED | OUTPATIENT
Start: 2023-02-21

## 2023-02-21 NOTE — TELEPHONE ENCOUNTER
Since she works at [employer], you can ask her if she would like us to try them for the 37.5mg dosage. .  Or we can switch her to 30mg. .. its up to her

## 2023-03-17 RX ORDER — PHENTERMINE HYDROCHLORIDE 30 MG/1
30 CAPSULE ORAL EVERY MORNING
Qty: 90 CAPSULE | Refills: 0 | OUTPATIENT
Start: 2023-03-17

## 2023-03-17 NOTE — TELEPHONE ENCOUNTER
Requesting:   Wegovy  LOV: 10/27/2022  RTC:   Last Relevant Labs:   Filled: 12/31/2022 9mL with 0 refill    Future Appointments   Date Time Provider Leora Hansen   4/6/2023 11:20 AM GERRY Kohli [id number]

## 2023-03-19 RX ORDER — SEMAGLUTIDE 1.7 MG/.75ML
1.7 INJECTION, SOLUTION SUBCUTANEOUS WEEKLY
Qty: 9 ML | Refills: 0 | Status: SHIPPED | OUTPATIENT
Start: 2023-03-19

## 2023-04-06 ENCOUNTER — OFFICE VISIT (OUTPATIENT)
Dept: INTERNAL MEDICINE CLINIC | Facility: CLINIC | Age: 53
End: 2023-04-06
Payer: COMMERCIAL

## 2023-04-06 VITALS
RESPIRATION RATE: 16 BRPM | WEIGHT: 171 LBS | BODY MASS INDEX: 27.48 KG/M2 | DIASTOLIC BLOOD PRESSURE: 89 MMHG | HEIGHT: 66 IN | OXYGEN SATURATION: 96 % | SYSTOLIC BLOOD PRESSURE: 130 MMHG | HEART RATE: 80 BPM

## 2023-04-06 DIAGNOSIS — F43.9 STRESS: ICD-10-CM

## 2023-04-06 DIAGNOSIS — E55.9 VITAMIN D DEFICIENCY: ICD-10-CM

## 2023-04-06 DIAGNOSIS — E66.9 OBESITY (BMI 30.0-34.9): ICD-10-CM

## 2023-04-06 DIAGNOSIS — Z51.81 ENCOUNTER FOR THERAPEUTIC DRUG MONITORING: Primary | ICD-10-CM

## 2023-04-06 PROCEDURE — 3075F SYST BP GE 130 - 139MM HG: CPT | Performed by: NURSE PRACTITIONER

## 2023-04-06 PROCEDURE — 3008F BODY MASS INDEX DOCD: CPT | Performed by: NURSE PRACTITIONER

## 2023-04-06 PROCEDURE — 99213 OFFICE O/P EST LOW 20 MIN: CPT | Performed by: NURSE PRACTITIONER

## 2023-04-06 PROCEDURE — 3079F DIAST BP 80-89 MM HG: CPT | Performed by: NURSE PRACTITIONER

## 2023-04-06 NOTE — PATIENT INSTRUCTIONS
Next steps:  1. Fill your prescribed medication and take as discussed and prescribed: wegovy 1.7mg weekly and phentermine  2. Schedule a personal nutrition consultation with one of our registered dieticians       1. Drink water with meals and throughout the day, cut down on soda and/or juice if consumed. Consider flavored water options like Bubbly, Spindrift, Hint and Emily. 2.  Eat breakfast daily and focus on having protein with each meal, examples include: greek yogurt, cottage cheese, hard boiled egg, whole grain toast with peanut butter. 3.  Reduce refined carbohydrates and sugars which includes items such as sweets, as well as rice, pasta, and bread and make sure to choose whole grain options when having them with just 1 serving per meal about the size of your inner palm. 4.  Consume non starchy veggies daily working towards making them a good 50% of your daily food intake. Add them to lunch and dinner consistently. 5.  Start a daily probiotic: VSL#3 is recommended, (order on line at www.vsl3. com). Take 1 capsule daily with water for 30 days, then reduce to 1 every other day (this will reduce the cost). Capsules can be left out for 2 weeks, but then must be refrigerated. Please download neyda Marcandi Edouard Kayser! Or Net Diary to monitor daily dietary intake and you will be able to see if you are eating the right amount of calories or too much or too little which would hinder weight loss. Additionally this will help to see your daily carbohydrate and protein intake. When you set the neyda up choose 1-2 lbs/week as a goal.  Keeping a paper food journal is an option as well to remain accountable for your choices- this is the start to mindful eating! A low calorie diet has been consistently shown to support weight loss. Continue or start exercising to help establish a routine.  If not already exercising begin with 1 day and progress as able with long-term goal of 30 minutes 5 days a week at a minimum. Meditation daily can help manage and control stress. Chronic stress can make weight loss difficult. Exercising is one way to help with stress, but meditation using the CALM Benji or another comparable alternative can be done in your home or place of work with little time commitment. This Benji can also help work on behavior change and improve sleep. Check out the segment under Calm Masterclass and listen to The 4 Pillars of Health. A great way to begin learning about the foundation of lifestyle with practical tips to use in your every day. Check out www.yourweightmatters. org blog for continued daily support and education along this weight loss journey! Patient Resources:     Personal Training/Fitness Classes/Health Coaching     Carmelita Stuart and Gibson Sophiaside @ [email or web address]/ Full fitness center with group fitness and personal training. Discount available as client of [facility]. Health Coaching and Personal Training with Jagjit Caceres at our [facility]- individual weekly coaching with option to add personal training and small group fitness classes targeted at weight loss- [phone] and/or email @ [email or web address]  360FIT [place] [email or web address] Group Fitness [phone] and/or email Davi Cueto at [employer] [email or web address]. Everyday Solutions  [address] with multiple locations: [employer]et[employer] (www.Unype), Eat The [employer] (www.Sequenta. Everyday Solutions), Fit Body Bootcamp (www.DrDoctorbodybootMedsurant Monitoringp.Everyday Solutions), Anonymous You (www.DigitalScirocco), The Exercise  (www.exercisecoach.Everyday Solutions)     Online Fitness  Fitness  on [employer] in 10 DVD series- www. novjp81WZJ. Everyday Solutions  Sit and Be Fit - Chair exercise series Www.sitandbefit. org  Hip Hop Fit with Mike Cleaning at www.hiphopfit. net     Apps for on the Tiinkk 7 Minute Workout (orange box with white 7) - free on the go HIIT training benji  Peloton Benji @ www. Luxanova     Nutrition Trackers and Tools  LoseIT! And My Fitness Pal apps and on line for tracking nutrition  NOOM - virtual health coaching  FitFoundation (healthy meals on the go) in [facility] @ www. hqaxfnsaveyiv9s. Elizabeth January MD @ www.bistromd.com and Clark Hebert (keto and low carb plans recommended) @ www. CWQKKP86.RYDERKT, Metabolic Meals @ www. inploid.comMetabolicMeals. com - individual prepared meals to go  [employer], [employer], [employer], Every Plate, Whale Path- on line meal delivery programs for preparation at home  AK MyNines in [place] for homemade meals to go @ wwwCryoLife. Winerist  Diet Doctor @ www. dietdoctor. Winerist - low carb swaps  Yummly - meal prep and planning benji (www.yummly. com)     Stress Management/Behavior/Mindful Eating  CALM meditation benji (wwwYaphie)  Headspace  Am I Hungry? Mindful eating virtual  benji  Www.yourweightmatters. org - Obesity Action Coalition sponsored Blog posts daily  Motivation benji (black box with white \")- daily supportive messages sent to your phone     Books/Video Education/Podcasts  Mindless Eating by Calos Murphy  Why We Get Sick by Shankar Barr (a book about insulin resistance)  Atomic Habits by Cassius Wilson (a book about taking small steps to promote greater behavior change)   Can't Hurt Me by Misha Arvizu (a book exploring the power of discipline in achieving your goals)  The End of Dieting: How to Live for Life by Dr. Leslie Jacobson M.D. or listen to The 1995 [facility] Episode 61: Understanding \"Nutritarian\" Eating w/Dr. Leslie Jacobson  Your Body in Balance: The [employer] [place] of Food, Hormones, and Health by Dr. Tanisha Uriarte  The Menopause Diet Plan by Steph Collier and [facility] HOSP AT [facility]  The Complete Guide to fasting by Dr. Raphael Greene, [facility] by Lillie Thapa, Ph.D, R.D.   Weight Loss Surgery Will Not Treat Food Addiction by Enmanuel Fortune Ph.D  The Game Changers- [employer] Documentary on plant based nutrition  Fed Up - documentary about obesity (Free on Utube)  The Truth About Sugar - documentary on sugar (Free on Utube, https://youtu. be/1E2hzrvLC0p)  The Dr. Joe Roque by Dr. Mel Rivera MD  Fitlosophy Fitspiration - journal to better health (found at [employer] in fitness aisle)  What Happened to You?- a look at the impact trauma has on behavior written by Sisi Sal and Dr. Boris Foley Again by Jeni Waite - discovering your true self after trauma  Andi Braga talk on [employer], The Call to Courage  Podcasts: The Exam Room by the Physician's Committee, Nutrition Facts by Dr. Vargas Young    We are here to support you with weight loss, but please remember that you still need your primary care provider for your routine health maintenance.

## 2023-05-05 ENCOUNTER — TELEPHONE (OUTPATIENT)
Dept: INTERNAL MEDICINE CLINIC | Facility: CLINIC | Age: 53
End: 2023-05-05

## 2023-05-05 RX ORDER — OXYCODONE HYDROCHLORIDE 5 MG/1
5 TABLET ORAL 2 TIMES DAILY PRN
Qty: 10 TABLET | Refills: 0 | Status: SHIPPED | OUTPATIENT
Start: 2023-05-05

## 2023-05-05 NOTE — TELEPHONE ENCOUNTER
No  Needs in person unfortunately especially if needs pain medication. MJ note states she is using tylenol PRN. I will order 10 tabs  To pharmacy.

## 2023-05-05 NOTE — TELEPHONE ENCOUNTER
Varun, pt's dtr notified SD would not be able to do a HFU in a VV. Pt scheduled with SD for 5/9/23 at 2:20pm for OV 40 minutes. Pt notified SD will be sending in a script for #10 Oxycodone (using one tablet BID) but to call the pharmacy to see if the script requires a PA or she can pay out of pocket. Pt verbalizes understanding.
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
## 2023-05-09 ENCOUNTER — LAB ENCOUNTER (OUTPATIENT)
Dept: LAB | Age: 53
End: 2023-05-09
Attending: NURSE PRACTITIONER
Payer: COMMERCIAL

## 2023-05-09 ENCOUNTER — OFFICE VISIT (OUTPATIENT)
Dept: INTERNAL MEDICINE CLINIC | Facility: CLINIC | Age: 53
End: 2023-05-09
Payer: COMMERCIAL

## 2023-05-09 VITALS
BODY MASS INDEX: 31.47 KG/M2 | HEIGHT: 62 IN | DIASTOLIC BLOOD PRESSURE: 66 MMHG | OXYGEN SATURATION: 96 % | WEIGHT: 171 LBS | SYSTOLIC BLOOD PRESSURE: 120 MMHG | HEART RATE: 108 BPM | RESPIRATION RATE: 22 BRPM

## 2023-05-09 DIAGNOSIS — E55.9 VITAMIN D DEFICIENCY: ICD-10-CM

## 2023-05-09 DIAGNOSIS — R52 PAIN: ICD-10-CM

## 2023-05-09 DIAGNOSIS — R79.89 ELEVATED LFTS: ICD-10-CM

## 2023-05-09 DIAGNOSIS — S22.019D CLOSED FRACTURE OF FIRST THORACIC VERTEBRA WITH ROUTINE HEALING, UNSPECIFIED FRACTURE MORPHOLOGY, SUBSEQUENT ENCOUNTER: ICD-10-CM

## 2023-05-09 DIAGNOSIS — S22.49XD CLOSED FRACTURE OF MULTIPLE RIBS WITH ROUTINE HEALING, UNSPECIFIED LATERALITY, SUBSEQUENT ENCOUNTER: ICD-10-CM

## 2023-05-09 DIAGNOSIS — S32.009D CLOSED FRACTURE OF TRANSVERSE PROCESS OF LUMBAR VERTEBRA WITH ROUTINE HEALING, SUBSEQUENT ENCOUNTER: ICD-10-CM

## 2023-05-09 DIAGNOSIS — S22.089D CLOSED FRACTURE OF TWELFTH THORACIC VERTEBRA WITH ROUTINE HEALING, UNSPECIFIED FRACTURE MORPHOLOGY, SUBSEQUENT ENCOUNTER: Primary | ICD-10-CM

## 2023-05-09 DIAGNOSIS — K59.00 CONSTIPATION, UNSPECIFIED CONSTIPATION TYPE: ICD-10-CM

## 2023-05-09 PROBLEM — S32.009A CLOSED FRACTURE OF TRANSVERSE PROCESS OF LUMBAR VERTEBRA (HCC): Status: ACTIVE | Noted: 2023-05-09

## 2023-05-09 LAB
ALBUMIN SERPL-MCNC: 3.5 G/DL (ref 3.4–5)
ALBUMIN/GLOB SERPL: 0.9 {RATIO} (ref 1–2)
ALP LIVER SERPL-CCNC: 233 U/L
ALT SERPL-CCNC: 93 U/L
ANION GAP SERPL CALC-SCNC: 3 MMOL/L (ref 0–18)
AST SERPL-CCNC: 48 U/L (ref 15–37)
BILIRUB SERPL-MCNC: 0.3 MG/DL (ref 0.1–2)
BUN BLD-MCNC: 19 MG/DL (ref 7–18)
CALCIUM BLD-MCNC: 9.4 MG/DL (ref 8.5–10.1)
CHLORIDE SERPL-SCNC: 109 MMOL/L (ref 98–112)
CO2 SERPL-SCNC: 28 MMOL/L (ref 21–32)
CREAT BLD-MCNC: 0.88 MG/DL
FASTING STATUS PATIENT QL REPORTED: NO
GFR SERPLBLD BASED ON 1.73 SQ M-ARVRAT: 79 ML/MIN/1.73M2 (ref 60–?)
GLOBULIN PLAS-MCNC: 4.1 G/DL (ref 2.8–4.4)
GLUCOSE BLD-MCNC: 114 MG/DL (ref 70–99)
OSMOLALITY SERPL CALC.SUM OF ELEC: 293 MOSM/KG (ref 275–295)
POTASSIUM SERPL-SCNC: 4.4 MMOL/L (ref 3.5–5.1)
PROT SERPL-MCNC: 7.6 G/DL (ref 6.4–8.2)
SODIUM SERPL-SCNC: 140 MMOL/L (ref 136–145)
VIT D+METAB SERPL-MCNC: 34.4 NG/ML (ref 30–100)

## 2023-05-09 PROCEDURE — 82306 VITAMIN D 25 HYDROXY: CPT

## 2023-05-09 PROCEDURE — 3078F DIAST BP <80 MM HG: CPT | Performed by: NURSE PRACTITIONER

## 2023-05-09 PROCEDURE — 36415 COLL VENOUS BLD VENIPUNCTURE: CPT

## 2023-05-09 PROCEDURE — 3008F BODY MASS INDEX DOCD: CPT | Performed by: NURSE PRACTITIONER

## 2023-05-09 PROCEDURE — 99214 OFFICE O/P EST MOD 30 MIN: CPT | Performed by: NURSE PRACTITIONER

## 2023-05-09 PROCEDURE — 80053 COMPREHEN METABOLIC PANEL: CPT

## 2023-05-09 PROCEDURE — 3074F SYST BP LT 130 MM HG: CPT | Performed by: NURSE PRACTITIONER

## 2023-05-09 RX ORDER — METHOCARBAMOL 750 MG/1
750 TABLET, FILM COATED ORAL 4 TIMES DAILY
Refills: 0 | COMMUNITY
Start: 2023-05-09

## 2023-05-09 RX ORDER — TRAMADOL HYDROCHLORIDE 50 MG/1
50 TABLET ORAL 2 TIMES DAILY PRN
Qty: 60 TABLET | Refills: 0 | Status: SHIPPED | OUTPATIENT
Start: 2023-05-09

## 2023-05-09 RX ORDER — GABAPENTIN 600 MG/1
600 TABLET ORAL 3 TIMES DAILY
Refills: 0 | COMMUNITY
Start: 2023-05-09

## 2023-05-10 ENCOUNTER — TELEPHONE (OUTPATIENT)
Dept: INTERNAL MEDICINE CLINIC | Facility: CLINIC | Age: 53
End: 2023-05-10

## 2023-05-10 NOTE — TELEPHONE ENCOUNTER
Da RODRÍGUEZ from [facility]    PT 1 X for 2 week    [facility] is working with insurance to get an additional 2 weeks.

## 2023-05-12 ENCOUNTER — TELEPHONE (OUTPATIENT)
Dept: INTERNAL MEDICINE CLINIC | Facility: CLINIC | Age: 53
End: 2023-05-12

## 2023-05-16 ENCOUNTER — TELEPHONE (OUTPATIENT)
Dept: INTERNAL MEDICINE CLINIC | Facility: CLINIC | Age: 53
End: 2023-05-16

## 2023-05-16 NOTE — TELEPHONE ENCOUNTER
Paperwork was received from [facility] regarding OT. It was placed on SD desk for review.

## 2023-06-08 ENCOUNTER — TELEPHONE (OUTPATIENT)
Dept: INTERNAL MEDICINE CLINIC | Facility: CLINIC | Age: 53
End: 2023-06-08

## 2023-06-08 NOTE — TELEPHONE ENCOUNTER
Paperwork was received from [facility] for review. It was placed on SD desk for review only.

## 2023-06-15 ENCOUNTER — TELEPHONE (OUTPATIENT)
Dept: INTERNAL MEDICINE CLINIC | Facility: CLINIC | Age: 53
End: 2023-06-15

## 2023-08-22 ENCOUNTER — ORDER TRANSCRIPTION (OUTPATIENT)
Dept: PHYSICAL THERAPY | Facility: HOSPITAL | Age: 53
End: 2023-08-22

## 2023-08-22 DIAGNOSIS — M25.551 RIGHT HIP PAIN: ICD-10-CM

## 2023-08-22 DIAGNOSIS — M25.511 SHOULDER PAIN, BILATERAL: ICD-10-CM

## 2023-08-22 DIAGNOSIS — Z87.81 HISTORY OF SPINAL FRACTURE: ICD-10-CM

## 2023-08-22 DIAGNOSIS — M47.818 SPONDYLOSIS WITHOUT MYELOPATHY OR RADICULOPATHY, SACRAL AND SACROCOCCYGEAL REGION: Primary | ICD-10-CM

## 2023-08-22 DIAGNOSIS — M25.512 SHOULDER PAIN, BILATERAL: ICD-10-CM

## 2023-08-23 ENCOUNTER — LAB ENCOUNTER (OUTPATIENT)
Dept: LAB | Age: 53
End: 2023-08-23
Attending: INTERNAL MEDICINE
Payer: COMMERCIAL

## 2023-08-23 DIAGNOSIS — R74.8 ACID PHOSPHATASE ELEVATED: ICD-10-CM

## 2023-08-23 DIAGNOSIS — K76.0 NAFLD (NONALCOHOLIC FATTY LIVER DISEASE): Primary | ICD-10-CM

## 2023-08-23 LAB
ALBUMIN SERPL-MCNC: 3.7 G/DL (ref 3.4–5)
ALBUMIN/GLOB SERPL: 0.9 {RATIO} (ref 1–2)
ALP LIVER SERPL-CCNC: 114 U/L
ALT SERPL-CCNC: 35 U/L
ANION GAP SERPL CALC-SCNC: 0 MMOL/L (ref 0–18)
AST SERPL-CCNC: 26 U/L (ref 15–37)
BASOPHILS # BLD AUTO: 0.03 X10(3) UL (ref 0–0.2)
BASOPHILS NFR BLD AUTO: 0.5 %
BILIRUB SERPL-MCNC: 0.6 MG/DL (ref 0.1–2)
BUN BLD-MCNC: 13 MG/DL (ref 7–18)
CALCIUM BLD-MCNC: 9.4 MG/DL (ref 8.5–10.1)
CHLORIDE SERPL-SCNC: 107 MMOL/L (ref 98–112)
CO2 SERPL-SCNC: 29 MMOL/L (ref 21–32)
CREAT BLD-MCNC: 0.8 MG/DL
EGFRCR SERPLBLD CKD-EPI 2021: 88 ML/MIN/1.73M2 (ref 60–?)
EOSINOPHIL # BLD AUTO: 0.12 X10(3) UL (ref 0–0.7)
EOSINOPHIL NFR BLD AUTO: 2.1 %
ERYTHROCYTE [DISTWIDTH] IN BLOOD BY AUTOMATED COUNT: 12.6 %
FASTING STATUS PATIENT QL REPORTED: NO
GLOBULIN PLAS-MCNC: 3.9 G/DL (ref 2.8–4.4)
GLUCOSE BLD-MCNC: 94 MG/DL (ref 70–99)
HCT VFR BLD AUTO: 40.9 %
HGB BLD-MCNC: 14 G/DL
IMM GRANULOCYTES # BLD AUTO: 0.01 X10(3) UL (ref 0–1)
IMM GRANULOCYTES NFR BLD: 0.2 %
LYMPHOCYTES # BLD AUTO: 2 X10(3) UL (ref 1–4)
LYMPHOCYTES NFR BLD AUTO: 35.4 %
MCH RBC QN AUTO: 30.8 PG (ref 26–34)
MCHC RBC AUTO-ENTMCNC: 34.2 G/DL (ref 31–37)
MCV RBC AUTO: 89.9 FL
MONOCYTES # BLD AUTO: 0.45 X10(3) UL (ref 0.1–1)
MONOCYTES NFR BLD AUTO: 8 %
NEUTROPHILS # BLD AUTO: 3.04 X10 (3) UL (ref 1.5–7.7)
NEUTROPHILS # BLD AUTO: 3.04 X10(3) UL (ref 1.5–7.7)
NEUTROPHILS NFR BLD AUTO: 53.8 %
OSMOLALITY SERPL CALC.SUM OF ELEC: 282 MOSM/KG (ref 275–295)
PLATELET # BLD AUTO: 302 10(3)UL (ref 150–450)
POTASSIUM SERPL-SCNC: 4.5 MMOL/L (ref 3.5–5.1)
PROT SERPL-MCNC: 7.6 G/DL (ref 6.4–8.2)
RBC # BLD AUTO: 4.55 X10(6)UL
SODIUM SERPL-SCNC: 136 MMOL/L (ref 136–145)
WBC # BLD AUTO: 5.7 X10(3) UL (ref 4–11)

## 2023-08-23 PROCEDURE — 36415 COLL VENOUS BLD VENIPUNCTURE: CPT

## 2023-08-23 PROCEDURE — 80053 COMPREHEN METABOLIC PANEL: CPT

## 2023-08-23 PROCEDURE — 85025 COMPLETE CBC W/AUTO DIFF WBC: CPT

## 2023-08-28 ENCOUNTER — TELEPHONE (OUTPATIENT)
Dept: PHYSICAL THERAPY | Facility: HOSPITAL | Age: 53
End: 2023-08-28

## 2023-08-30 ENCOUNTER — OFFICE VISIT (OUTPATIENT)
Dept: PHYSICAL THERAPY | Age: 53
End: 2023-08-30
Attending: ORTHOPAEDIC SURGERY
Payer: COMMERCIAL

## 2023-08-30 DIAGNOSIS — M25.511 SHOULDER PAIN, BILATERAL: ICD-10-CM

## 2023-08-30 DIAGNOSIS — Z87.81 HISTORY OF SPINAL FRACTURE: ICD-10-CM

## 2023-08-30 DIAGNOSIS — M47.818 SPONDYLOSIS WITHOUT MYELOPATHY OR RADICULOPATHY, SACRAL AND SACROCOCCYGEAL REGION: Primary | ICD-10-CM

## 2023-08-30 DIAGNOSIS — M25.551 RIGHT HIP PAIN: ICD-10-CM

## 2023-08-30 DIAGNOSIS — M25.512 SHOULDER PAIN, BILATERAL: ICD-10-CM

## 2023-08-30 PROCEDURE — 97162 PT EVAL MOD COMPLEX 30 MIN: CPT | Performed by: PHYSICAL THERAPIST

## 2023-08-30 PROCEDURE — 97140 MANUAL THERAPY 1/> REGIONS: CPT | Performed by: PHYSICAL THERAPIST

## 2023-09-01 ENCOUNTER — TELEPHONE (OUTPATIENT)
Dept: PHYSICAL THERAPY | Facility: HOSPITAL | Age: 53
End: 2023-09-01

## 2023-09-06 ENCOUNTER — ORDER TRANSCRIPTION (OUTPATIENT)
Dept: PHYSICAL THERAPY | Facility: HOSPITAL | Age: 53
End: 2023-09-06

## 2023-09-06 DIAGNOSIS — M25.512 ACUTE PAIN OF BOTH SHOULDERS: Primary | ICD-10-CM

## 2023-09-06 DIAGNOSIS — M75.52: ICD-10-CM

## 2023-09-06 DIAGNOSIS — M75.51: ICD-10-CM

## 2023-09-06 DIAGNOSIS — M25.511 ACUTE PAIN OF BOTH SHOULDERS: Primary | ICD-10-CM

## 2023-09-07 ENCOUNTER — OFFICE VISIT (OUTPATIENT)
Dept: PHYSICAL THERAPY | Age: 53
End: 2023-09-07
Attending: ORTHOPAEDIC SURGERY
Payer: COMMERCIAL

## 2023-09-07 ENCOUNTER — ORDER TRANSCRIPTION (OUTPATIENT)
Dept: PHYSICAL THERAPY | Facility: HOSPITAL | Age: 53
End: 2023-09-07

## 2023-09-07 DIAGNOSIS — M67.919 DISORDER OF BURSAE AND TENDONS IN SHOULDER REGION: ICD-10-CM

## 2023-09-07 DIAGNOSIS — M25.511 ACUTE PAIN OF BOTH SHOULDERS: Primary | ICD-10-CM

## 2023-09-07 DIAGNOSIS — M71.9 DISORDER OF BURSAE AND TENDONS IN SHOULDER REGION: ICD-10-CM

## 2023-09-07 DIAGNOSIS — M25.512 ACUTE PAIN OF BOTH SHOULDERS: Primary | ICD-10-CM

## 2023-09-07 PROCEDURE — 97140 MANUAL THERAPY 1/> REGIONS: CPT | Performed by: PHYSICAL THERAPIST

## 2023-09-07 PROCEDURE — 97110 THERAPEUTIC EXERCISES: CPT | Performed by: PHYSICAL THERAPIST

## 2023-09-08 NOTE — PROGRESS NOTES
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 She is to start right shoulder therapy. Burning in both forearms still present   Current Pain Ratin/10 neck and shoulder pain  Objective:   Right shoulder pain at end ROM flexion/abduction. ROM WFL. Positive impingement - cues for posture decrease pain. Slight right lateral shift cervical spine   Shoulder flex/abd/IR/ER 5/5 strength - mild pain with resistance   HEP additions as noted      Assessment:   Patient presents with new prescription for right shoulder treatment. Overall strength is good. Pain is associated primarily with end ROM. There is improvement in pain with postural correction. Good tolerance for soft tissue work. Cervical PROM WNL. Flight forward flexion in standing and C/T pain with position changes persists     Plan:   PT 2x/wk manual therapy, progressive exercise as tolerated     Date: 2023  Tx#:  Date: Tx#: 3/ Date: Tx#: 4/ Date: Tx#: 5/ Date: Tx#: 6/ Date: Tx#: 7/ Date: Tx#: 8/   TherEx TherEx TherEx TherEx TherEx TherEx TherEx   Cervical PROM in supine                  Horizontal abduction green band (HEP)         Base position ER green band (HEP)         Manual therapy 20  min         MFR C/T/L fascia, bilat forearms, right G/H mob lat glide/inferior glide                                        Charges:  TherEx 2, manual therapy        Total Timed Treatment: 50 min  Total Treatment Time: 50 min

## 2023-09-11 ENCOUNTER — OFFICE VISIT (OUTPATIENT)
Dept: PHYSICAL THERAPY | Age: 53
End: 2023-09-11
Attending: ORTHOPAEDIC SURGERY
Payer: COMMERCIAL

## 2023-09-11 PROCEDURE — 97140 MANUAL THERAPY 1/> REGIONS: CPT | Performed by: PHYSICAL THERAPIST

## 2023-09-11 PROCEDURE — 97110 THERAPEUTIC EXERCISES: CPT | Performed by: PHYSICAL THERAPIST

## 2023-09-11 NOTE — PROGRESS NOTES
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 PROM cervical spine - no pain. Decreased forearm complaints with support at cervical lordosis    Right shoulder pain at end ROM only. Assessment:   Pain appears related to decreased reaction/timing of spinal stabilizers. In addition to cervical spine pain, there is mid thoracic spine pain     Plan:   PT 2x/wk manual therapy, progressive exercise as tolerated     Date: 2023  Tx#:  Date: 2023   Tx#: 3/ Date: Tx#: 4/ Date: Tx#: 5/ Date: Tx#: 6/ Date: Tx#: 7/ Date: Tx#: 8/   TherEx TherEx TherEx TherEx TherEx TherEx TherEx   Cervical PROM in supine UBE 6 minutes          Split stance same side rotation with SB 10 reps each        Horizontal abduction green band (HEP) Forward step overhead lift SB 5 reps each         Base position ER green band (HEP) Knee to chest with SB 20 reps        Manual therapy 20  min LTR 10 reps         MFR C/T/L fascia, bilat forearms, right G/H mob lat glide/inferior glide SL thoracic mobility 12 reps each          Quadruped rock with hip hinge/spinal flexion          Cervical PROM, manual distraction - manual therapy 10 min                     Charges:  TherEx 2, manual therapy        Total Timed Treatment:  45 min  Total Treatment Time: 45 min

## 2023-09-13 ENCOUNTER — OFFICE VISIT (OUTPATIENT)
Dept: PHYSICAL THERAPY | Age: 53
End: 2023-09-13
Attending: ORTHOPAEDIC SURGERY
Payer: COMMERCIAL

## 2023-09-13 PROCEDURE — 97110 THERAPEUTIC EXERCISES: CPT | Performed by: PHYSICAL THERAPIST

## 2023-09-18 ENCOUNTER — OFFICE VISIT (OUTPATIENT)
Dept: PHYSICAL THERAPY | Age: 53
End: 2023-09-18
Attending: ORTHOPAEDIC SURGERY
Payer: COMMERCIAL

## 2023-09-18 PROCEDURE — 97110 THERAPEUTIC EXERCISES: CPT | Performed by: PHYSICAL THERAPIST

## 2023-09-20 ENCOUNTER — OFFICE VISIT (OUTPATIENT)
Dept: PHYSICAL THERAPY | Age: 53
End: 2023-09-20
Attending: ORTHOPAEDIC SURGERY
Payer: COMMERCIAL

## 2023-09-20 PROCEDURE — 97110 THERAPEUTIC EXERCISES: CPT | Performed by: PHYSICAL THERAPIST

## 2023-09-20 PROCEDURE — 97140 MANUAL THERAPY 1/> REGIONS: CPT | Performed by: PHYSICAL THERAPIST

## 2023-09-26 ENCOUNTER — OFFICE VISIT (OUTPATIENT)
Dept: PHYSICAL THERAPY | Age: 53
End: 2023-09-26
Attending: ORTHOPAEDIC SURGERY
Payer: COMMERCIAL

## 2023-09-26 PROCEDURE — 97140 MANUAL THERAPY 1/> REGIONS: CPT

## 2023-09-26 PROCEDURE — 97110 THERAPEUTIC EXERCISES: CPT
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
## 2023-09-27 ENCOUNTER — LAB ENCOUNTER (OUTPATIENT)
Dept: LAB | Age: 53
End: 2023-09-27
Attending: INTERNAL MEDICINE
Payer: COMMERCIAL

## 2023-09-27 DIAGNOSIS — R79.89 ABNORMAL LIVER FUNCTION TESTS: Primary | ICD-10-CM

## 2023-09-27 LAB
ALBUMIN SERPL-MCNC: 3.8 G/DL (ref 3.4–5)
ALBUMIN/GLOB SERPL: 1 {RATIO} (ref 1–2)
ALP LIVER SERPL-CCNC: 94 U/L
ALT SERPL-CCNC: 28 U/L
ANION GAP SERPL CALC-SCNC: 5 MMOL/L (ref 0–18)
AST SERPL-CCNC: 18 U/L (ref 15–37)
BILIRUB SERPL-MCNC: 0.5 MG/DL (ref 0.1–2)
BUN BLD-MCNC: 15 MG/DL (ref 7–18)
CALCIUM BLD-MCNC: 9.3 MG/DL (ref 8.5–10.1)
CHLORIDE SERPL-SCNC: 107 MMOL/L (ref 98–112)
CO2 SERPL-SCNC: 27 MMOL/L (ref 21–32)
CREAT BLD-MCNC: 0.65 MG/DL
EGFRCR SERPLBLD CKD-EPI 2021: 105 ML/MIN/1.73M2 (ref 60–?)
FASTING STATUS PATIENT QL REPORTED: YES
GLOBULIN PLAS-MCNC: 3.9 G/DL (ref 2.8–4.4)
GLUCOSE BLD-MCNC: 93 MG/DL (ref 70–99)
OSMOLALITY SERPL CALC.SUM OF ELEC: 289 MOSM/KG (ref 275–295)
POTASSIUM SERPL-SCNC: 4.3 MMOL/L (ref 3.5–5.1)
PROT SERPL-MCNC: 7.7 G/DL (ref 6.4–8.2)
SODIUM SERPL-SCNC: 139 MMOL/L (ref 136–145)

## 2023-09-27 PROCEDURE — 80053 COMPREHEN METABOLIC PANEL: CPT

## 2023-09-27 PROCEDURE — 36415 COLL VENOUS BLD VENIPUNCTURE: CPT

## 2023-09-28 ENCOUNTER — OFFICE VISIT (OUTPATIENT)
Dept: PHYSICAL THERAPY | Age: 53
End: 2023-09-28
Attending: ORTHOPAEDIC SURGERY
Payer: COMMERCIAL

## 2023-09-28 PROCEDURE — 97110 THERAPEUTIC EXERCISES: CPT

## 2023-09-28 PROCEDURE — 97112 NEUROMUSCULAR REEDUCATION: CPT

## 2023-09-28 NOTE — PROGRESS NOTES
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 She reports an ongoing burning sensation with arms with front and back of both forearms. Improving over time but still present. Indicates sensation of pressure with middle of the back. Ongoing neck stiffness. Objective:   Observation:  -Gait is steady, coordinated, symmetrical  -Turns en-block  -Antalgic with sit <->supine  -Stands with anterior inclination of th pelvis with increase in lumbar lordosis  Neuro  -Sensation is intact with upper extremity to light touch with hyperalgesia  -Reflexes: 2 throughout, soria's (-), strength grossly intact (UE/LE myotomes) key muscles  Special Tests  -Sharp-ginette (-)  Active movements  -Cervical flexion 40 degrees of bilaterally with reported tension  -Thoracolumbar flexion without reversal of lumbar lordosis  Passive/accessory movements  -Global hypomobility of cervical spine    Assessment:   Smith Zamorano presenting to sessions with favorable progressing. Emphasis this visit as abdominal endurance exercise within \"neutral\" spine with supine mat activities. Without pain complaint with supine mat activities. Sensitivity to thoracolumbar flexion such as with supine to sit transition and trunk curl which was modified to supine abdominal isometric. Decrease in mid thoracic discomfort with mid thoracic stretch. Goals in progress at this time with favorable progression being seen towards goal achievement. Without adverse response during session.       Plan: Remain at current frequency (2 x per week). Joint mobilization to cervical spine as appropriate; assessment of accessory thoracic mobility in side lying; progress functionally based response. Date: 9/7/2023  Tx#: 2/12 Date: 9/11/2023   Tx#: 3/ Date: 9/13/2023   Tx#: 4/ Date: 9/18/2023   Tx#: 5/ Date: 9/20/2023   Tx#: 6/12 Date: 9/26/2023  Tx#: 7/12 Date: 9/28/2023  Tx#:  8/12     TherEx TherEx TherEx TherEx TherEx TherEx TherEx   Cervical PROM in supine UBE 6 minutes  Nustep L4 8 minutes Elliptical 6 minutes  Elliptical 6 minutes  Treadmill x 20 minutes self selected brisk pace.     Split stance same side rotation with SB 10 reps each TRX bilateral shoulder flexion > 10 reps  TRX squats 10 reps x 2  Same side reach in split stance with SB 10 reps each - decreased stride to limit lumbar pain - steady gaze forward Supine lower trunk rotation bilaterally x 15 reps    Supine single knee to chest stretch x 20  Supine lower trunk rotation bilaterally x 15 reps    Supine single knee to chest stretch x 20     S/L trunk rotation x 15 reps each direction   Horizontal abduction green band (HEP) Forward step overhead lift SB 5 reps each  Hip mobility on step - good   Forward bending - right side lumbar pain  Hip hinge training - unable to perform  Anterior step with spinal extension 10 reps each - good  Supine 90/90 neural mobilization x 20 each LE    UE neural mobilization bilaterally  Supine 90/90 neural mobilization x 20 each LE    Supine posterior and anterior pelvic rotation with instruction in \"neutral\"l spine defined as position of comfort relative to the lumbar lordosis   Base position ER green band (HEP) Knee to chest with SB 20 reps Superband perturbations parallel and split stance  Quadruped rock with flexion and hip hinge - best with hip hinge Posterior step with bilat reach to lower leg - increased bilat UE complaints of burning - standing manual traction - decreased UE symptoms - right relief > left Segmental thoracolumbar flexion in standing: 10 reps    Sitting lumbar flexion: 10 second hold x 5 Supine leg slide within \"neutral\" spine 20 reps x each LE    Supine unilateral lower extremity \"neutral\" spine march with contralateral LE support 20 reps each LE    Supine march without contralateral support (up, up, down, down) within \"neutral\" spine x 15 reps each LE    Supine abdominal isometric with 55 cm stability ball: 5 second hold x 20 reps   Manual therapy 20  min LTR 10 reps  Bent knee fallouts/leg load - good  PROM right and left hip in supine - good Cervical isometrics - good no changes   Segmental thoracolumbar flexion in standing: 10 reps    Sitting lumbar flexion: 10 second hold x 5   MFR C/T/L fascia, bilat forearms, right G/H mob lat glide/inferior glide SL thoracic mobility 12 reps each  Prone press up - reduction right UE symptoms  Bent knee fallout right LE initially mild pain - contract/relax eliminated pain  Rhythmic stabilization contact points pelvis/shoulders - good   Seated mid-thoracic stretch: 10 second hold x 5 reps    AROM cervical rotation with supported sitting and neutral spine with scapular setting. Quadruped rock with hip hinge/spinal flexion  Knees to chest with SB 20 reps  SL thoracic rotation with rib grasp 10 reps each  Manual therapy 10 min: static support cervical lordosis - reduces symptoms, suboccipital release/contact increases bilat UE symptoms. Manual traction supine reduces UE symptoms.  Better reduction in symptoms with traction in standing   Manual therapy x 15 minutes    Joint mobilization consisting of physiological and accessory motion to cervical spine with grades based on end-feel on response, emphasis placed on up glides to facilitation rotation ______________    Cervical PROM, manual distraction - manual therapy 10 min Quadruped rock - no pain with maintaining lordosis, relaxed cervical position - increased forearm symptoms  SL rhythmic stabilization 5 reps each right and left SL  HEP addition D2 flexion with yellow band - patient to monitor for symptoms  HEP: Segmental trunk flexion x 10 reps x 1 daily    Sitting flexion 10 seconds x 5 reps as needed for symptom modulation HEP:   Segmental trunk flexion x 10 reps x 1 daily    Sitting flexion 10 seconds x 5 reps as needed for symptom modulation    Mid thoracic stretch x 10 second hold x 5 reps    \"Neutral spine\" with respect to sitting and standing postures. Activity as tolerated within comfort. PROM bilat hips, quad stretch - good mild decrease flexibility right quads versus left  SL quad stretch 20 seconds x 3 reps each           Charges:   Therapeutic excercise x 2  Neuromuscular re-education x 1     Total Timed Treatment:  45 min    Total Treatment Time: 45 min

## 2023-10-02 ENCOUNTER — OFFICE VISIT (OUTPATIENT)
Dept: PHYSICAL THERAPY | Age: 53
End: 2023-10-02
Attending: ORTHOPAEDIC SURGERY
Payer: COMMERCIAL

## 2023-10-02 PROCEDURE — 97140 MANUAL THERAPY 1/> REGIONS: CPT | Performed by: PHYSICAL THERAPIST

## 2023-10-02 PROCEDURE — 97110 THERAPEUTIC EXERCISES: CPT | Performed by: PHYSICAL THERAPIST

## 2023-10-02 NOTE — PROGRESS NOTES
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 Subjective:   Patient states that she continues to have burning in both her forearms right > left. Pain 4/10      Objective:   Gait: No deviation   Position changes: guarded/slow protective for cervical motion  Palpation: No soft tissue tenderness. Thoracic pain with PAS   Cervical traction in standing - decreased bilat UE complaints. In supine there is no decrease in symptoms with manual traction unless cervical lordosis is supported  Strength: Bilat UE strength = 5/5 with exception of bilat LT = 3-/5 - pain right shoulder > left. Right shoulder pain with ER at 90/90. : right = 70 pounds, left = 55 pounds   PROM cervical spine WNL. AROM decreased painful. Bilateral shoulder AROM - WNL   LE ROM WNL       Assessment:   Patient continues to have primary problem of difficulty with position changes and decreased AROM cervical spine. Pain at cervical and lumbar spine is predominately right sided only. She responds well to postural correction/support at cervical lordosis. There is reduction in bilat forearm complaints with manual traction in standing only. In supine, if the cervical lordosis is unsupported, bilat UE complaints. Increase. Right side shoulder pain may also be cervical related.   Her overall strength is WNL  She will benefit from additional therapy to improve position changes, postural training and exercise to improve tolerance for ADLS         Goals: (to be met in 12 visits)   No difficulty with dressing - Met   Full cervical ROM to improve ADL function - Not met  No difficulty with position changes - Not met   No difficulty ambulating minimum of 20 minutes - progressing   No difficulty lifting/carrying minimum of 20 pounds - NT     Post Oswestry Disability Index Score  Post Score: 60 % (10/2/2023 12:18 PM)    18 % improvement    Plan: Continue skilled Physical Therapy 2 x/week or a total of 8 visits over a 90 day period. Treatment will include: therapeutic exercise, neural re-ed        Patient/Family/Caregiver was advised of these findings, precautions, and treatment options and has agreed to actively participate in planning and for this course of care. Thank you for your referral. If you have any questions, please contact me at Dept: [phone]. Sincerely,  Electronically signed by therapist: Tushar Cano PT     Physician's certification required:  Yes  Please co-sign or sign and return this letter via fax as soon as possible to [phone]. I certify the need for these services furnished under this plan of treatment and while under my care. X___________________________________________________ Date____________________    Certification From: 10/8/2990  To:12/31/2023    Date: 9/7/2023  Tx#: 2/12 Date: 9/11/2023   Tx#: 3/ Date: 9/13/2023   Tx#: 4/ Date: 9/18/2023   Tx#: 5/ Date: 9/20/2023   Tx#: 6/12 Date: 9/26/2023  Tx#: 7/12 Date: 9/28/2023  Tx#:  8/12   Date: 10/2/2023   Tx#: 9/12    TherEx TherEx TherEx TherEx TherEx TherEx TherEx TherEx   Cervical PROM in supine UBE 6 minutes  Nustep L4 8 minutes Elliptical 6 minutes  Elliptical 6 minutes  Treadmill x 20 minutes self selected brisk pace.  Prone shoulder extension 10 reps x 2    Split stance same side rotation with SB 10 reps each TRX bilateral shoulder flexion > 10 reps  TRX squats 10 reps x 2  Same side reach in split stance with SB 10 reps each - decreased stride to limit lumbar pain - steady gaze forward Supine lower trunk rotation bilaterally x 15 reps    Supine single knee to chest stretch x 20  Supine lower trunk rotation bilaterally x 15 reps    Supine single knee to chest stretch x 20     S/L trunk rotation x 15 reps each direction Prone hip extension 10 reps each, review of stab principles     Horizontal abduction green band (HEP) Forward step overhead lift SB 5 reps each  Hip mobility on step - good   Forward bending - right side lumbar pain  Hip hinge training - unable to perform  Anterior step with spinal extension 10 reps each - good  Supine 90/90 neural mobilization x 20 each LE    UE neural mobilization bilaterally  Supine 90/90 neural mobilization x 20 each LE    Supine posterior and anterior pelvic rotation with instruction in \"neutral\"l spine defined as position of comfort relative to the lumbar lordosis Prone on elbows - decreased bilat UE symptoms, quadruped rocking - no pain with hip hinge    Base position ER green band (HEP) Knee to chest with SB 20 reps Superband perturbations parallel and split stance  Quadruped rock with flexion and hip hinge - best with hip hinge Posterior step with bilat reach to lower leg - increased bilat UE complaints of burning - standing manual traction - decreased UE symptoms - right relief > left Segmental thoracolumbar flexion in standing: 10 reps    Sitting lumbar flexion: 10 second hold x 5 Supine leg slide within \"neutral\" spine 20 reps x each LE    Supine unilateral lower extremity \"neutral\" spine march with contralateral LE support 20 reps each LE    Supine march without contralateral support (up, up, down, down) within \"neutral\" spine x 15 reps each LE    Supine abdominal isometric with 55 cm stability ball: 5 second hold x 20 reps Prone MT 10 reps x 2    Manual therapy 20  min LTR 10 reps  Bent knee fallouts/leg load - good  PROM right and left hip in supine - good Cervical isometrics - good no changes   Segmental thoracolumbar flexion in standing: 10 reps    Sitting lumbar flexion: 10 second hold x 5 Manual therapy 15 minutes : soft tissue release C/T/L fascia - no acute tenderness - tolerated well    MFR C/T/L fascia, bilat forearms, right G/H mob lat glide/inferior glide SL thoracic mobility 12 reps each  Prone press up - reduction right UE symptoms  Bent knee fallout right LE initially mild pain - contract/relax eliminated pain  Rhythmic stabilization contact points pelvis/shoulders - good   Seated mid-thoracic stretch: 10 second hold x 5 reps    AROM cervical rotation with supported sitting and neutral spine with scapular setting. Quadruped rock with hip hinge/spinal flexion  Knees to chest with SB 20 reps  SL thoracic rotation with rib grasp 10 reps each  Manual therapy 10 min: static support cervical lordosis - reduces symptoms, suboccipital release/contact increases bilat UE symptoms. Manual traction supine reduces UE symptoms. Better reduction in symptoms with traction in standing   Manual therapy x 15 minutes    Joint mobilization consisting of physiological and accessory motion to cervical spine with grades based on end-feel on response, emphasis placed on up glides to facilitation rotation ______________     Cervical PROM, manual distraction - manual therapy 10 min Quadruped rock - no pain with maintaining lordosis, relaxed cervical position - increased forearm symptoms  SL rhythmic stabilization 5 reps each right and left SL  HEP addition D2 flexion with yellow band - patient to monitor for symptoms  HEP: Segmental trunk flexion x 10 reps x 1 daily    Sitting flexion 10 seconds x 5 reps as needed for symptom modulation HEP:   Segmental trunk flexion x 10 reps x 1 daily    Sitting flexion 10 seconds x 5 reps as needed for symptom modulation    Mid thoracic stretch x 10 second hold x 5 reps    \"Neutral spine\" with respect to sitting and standing postures. Activity as tolerated within comfort.       PROM bilat hips, quad stretch - good mild decrease flexibility right quads versus left  SL quad stretch 20 seconds x 3 reps each            Charges:   Therapeutic excercise x 2  Manual therapy    Total Timed Treatment:  45 min    Total Treatment Time: 45 min

## 2023-10-04 ENCOUNTER — ORDER TRANSCRIPTION (OUTPATIENT)
Dept: PHYSICAL THERAPY | Facility: HOSPITAL | Age: 53
End: 2023-10-04

## 2023-10-04 ENCOUNTER — OFFICE VISIT (OUTPATIENT)
Dept: PHYSICAL THERAPY | Age: 53
End: 2023-10-04
Attending: ORTHOPAEDIC SURGERY
Payer: COMMERCIAL

## 2023-10-04 DIAGNOSIS — M47.817 LUMBOSACRAL SPONDYLOSIS WITHOUT MYELOPATHY: ICD-10-CM

## 2023-10-04 DIAGNOSIS — M67.919 DISORDER OF BURSAE AND TENDONS IN SHOULDER REGION: Primary | ICD-10-CM

## 2023-10-04 DIAGNOSIS — M71.9 DISORDER OF BURSAE AND TENDONS IN SHOULDER REGION: Primary | ICD-10-CM

## 2023-10-04 DIAGNOSIS — M54.2 CERVICALGIA: Primary | ICD-10-CM

## 2023-10-04 PROCEDURE — 97112 NEUROMUSCULAR REEDUCATION: CPT | Performed by: PHYSICAL THERAPIST

## 2023-10-04 PROCEDURE — 97110 THERAPEUTIC EXERCISES: CPT | Performed by: PHYSICAL THERAPIST

## 2023-10-05 NOTE — PROGRESS NOTES
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 No change in arm burning. Reports walking causes right LBP     Objective:   Cervical spine right lateral shift  Treatment session included HEP additions and additional patient education   Unable to perform thoracic mobility in SL secondary to cervical complaints    Cervical extension with support at lordosis reduced bilat UE symptoms          Assessment:   Patient presents with new prescription to continue with therapy for spine and right shoulder. Prescription includes adding home cervical traction if manual traction is beneficial. Today, as during previous sessions finds that manual cervical traction is of no benefit in supine. Support at cervical lordosis reduces pain. Manual traction in standing and sitting did not provide relief during today's session. Cervical extension with support at lordosis reduces UE symptoms. Cervical spine remains laterally shifted slightly to the right. Postural correction during ambulation reduces lumbar symptoms. Patient demonstrated improved understanding of postural correction and goal of centralizing symptoms. Plan:    As above. Continue PT 2x/wk     Date: 9/18/2023   Tx#: 5/ Date: 9/20/2023   Tx#: 6/12 Date: 9/26/2023  Tx#: 7/12 Date: 9/28/2023  Tx#:  8/12   Date: 10/2/2023   Tx#: 9/12  Date: 10/4/2023   Tx#: 10/24  New prescription received spine, right shoulder   TherEx TherEx TherEx TherEx TherEx TherEx   Elliptical 6 minutes  Elliptical 6 minutes  Treadmill x 20 minutes self selected brisk pace.  Prone shoulder extension 10 reps x 2 Treadmill 8 minutes - right side lumbar pain with cervical flexion    TRX squats 10 reps x 2  Same side reach in split stance with SB 10 reps each - decreased stride to limit lumbar pain - steady gaze forward Supine lower trunk rotation bilaterally x 15 reps    Supine single knee to chest stretch x 20  Supine lower trunk rotation bilaterally x 15 reps    Supine single knee to chest stretch x 20     S/L trunk rotation x 15 reps each direction Prone hip extension 10 reps each, review of stab principles   TRX squats 10 reps x 2    Hip hinge training - unable to perform  Anterior step with spinal extension 10 reps each - good  Supine 90/90 neural mobilization x 20 each LE    UE neural mobilization bilaterally  Supine 90/90 neural mobilization x 20 each LE    Supine posterior and anterior pelvic rotation with instruction in \"neutral\"l spine defined as position of comfort relative to the lumbar lordosis Prone on elbows - decreased bilat UE symptoms, quadruped rocking - no pain with hip hinge  AAROM with cane IR (HEP)   Quadruped rock with flexion and hip hinge - best with hip hinge Posterior step with bilat reach to lower leg - increased bilat UE complaints of burning - standing manual traction - decreased UE symptoms - right relief > left Segmental thoracolumbar flexion in standing: 10 reps    Sitting lumbar flexion: 10 second hold x 5 Supine leg slide within \"neutral\" spine 20 reps x each LE    Supine unilateral lower extremity \"neutral\" spine march with contralateral LE support 20 reps each LE    Supine march without contralateral support (up, up, down, down) within \"neutral\" spine x 15 reps each LE    Supine abdominal isometric with 55 cm stability ball: 5 second hold x 20 reps Prone MT 10 reps x 2  Standing bilat shoulder exten red band 10 reps x 2 (HEP)   PROM right and left hip in supine - good Cervical isometrics - good no changes   Segmental thoracolumbar flexion in standing: 10 reps    Sitting lumbar flexion: 10 second hold x 5 Manual therapy 15 minutes : soft tissue release C/T/L fascia - no acute tenderness - tolerated well  Attempted SL thoracic mobility - increased cervical pain   Bent knee fallout right LE initially mild pain - contract/relax eliminated pain  Rhythmic stabilization contact points pelvis/shoulders - good   Seated mid-thoracic stretch: 10 second hold x 5 reps    AROM cervical rotation with supported sitting and neutral spine with scapular setting. Thoracic mobility in standing with bilat UE /closed chain hip IR 10 reps each    SL thoracic rotation with rib grasp 10 reps each  Manual therapy 10 min: static support cervical lordosis - reduces symptoms, suboccipital release/contact increases bilat UE symptoms. Manual traction supine reduces UE symptoms. Better reduction in symptoms with traction in standing   Manual therapy x 15 minutes    Joint mobilization consisting of physiological and accessory motion to cervical spine with grades based on end-feel on response, emphasis placed on up glides to facilitation rotation ______________  Neuro re-ed   Hip hinge training, additional postural training 10 minutes  Leg load with bracing through UE, bent knee fallouts with bracing   SL rhythmic stabilization 5 reps each right and left SL  HEP addition D2 flexion with yellow band - patient to monitor for symptoms  HEP: Segmental trunk flexion x 10 reps x 1 daily    Sitting flexion 10 seconds x 5 reps as needed for symptom modulation HEP:   Segmental trunk flexion x 10 reps x 1 daily    Sitting flexion 10 seconds x 5 reps as needed for symptom modulation    Mid thoracic stretch x 10 second hold x 5 reps    \"Neutral spine\" with respect to sitting and standing postures. Activity as tolerated within comfort. Leg load with bracing through UE, bent knee fallouts with bracing    SL quad stretch 20 seconds x 3 reps each             Charges:   Therapeutic excercise x 2  Neuro re-ed   Total Timed Treatment:  45 min    Total Treatment Time: 45 min

## 2023-10-09 ENCOUNTER — OFFICE VISIT (OUTPATIENT)
Dept: PHYSICAL THERAPY | Age: 53
End: 2023-10-09
Attending: ORTHOPAEDIC SURGERY
Payer: COMMERCIAL

## 2023-10-09 PROCEDURE — 97110 THERAPEUTIC EXERCISES: CPT | Performed by: PHYSICAL THERAPIST

## 2023-10-09 NOTE — PROGRESS NOTES
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 Neck and burning in both arms   Objective:   Difficulty with position changes - cervical guarding         Assessment:   Cervical manual traction in supine increased bilat UE symptoms. Decreased active cervical ROM appears secondary to delay spinal stabilizer reaction. Pain is best reduced with support at cervical lordosis and spinal extension         Plan    As above. Continue PT 2x/wk     Date: 9/18/2023   Tx#: 5/ Date: 9/20/2023   Tx#: 6/12 Date: 9/26/2023  Tx#: 7/12 Date: 9/28/2023  Tx#:  8/12   Date: 10/2/2023   Tx#: 9/12  Date: 10/4/2023   Tx#: 10/24  New prescription received spine, right shoulder Date: 10/9/2023   Tx#: 11/24      TherEx TherEx TherEx TherEx TherEx TherEx TherEx   Elliptical 6 minutes  Elliptical 6 minutes  Treadmill x 20 minutes self selected brisk pace.  Prone shoulder extension 10 reps x 2 Treadmill 8 minutes - right side lumbar pain with cervical flexion  Elliptical 8 minutes    TRX squats 10 reps x 2  Same side reach in split stance with SB 10 reps each - decreased stride to limit lumbar pain - steady gaze forward Supine lower trunk rotation bilaterally x 15 reps    Supine single knee to chest stretch x 20  Supine lower trunk rotation bilaterally x 15 reps    Supine single knee to chest stretch x 20     S/L trunk rotation x 15 reps each direction Prone hip extension 10 reps each, review of stab principles   TRX squats 10 reps x 2  Forward step with same side reach, forward step SB overhead lift, closed chain hip IR right and left LE   Hip hinge training - unable to perform  Anterior step with spinal extension 10 reps each - good  Supine 90/90 neural mobilization x 20 each LE    UE neural mobilization bilaterally  Supine 90/90 neural mobilization x 20 each LE    Supine posterior and anterior pelvic rotation with instruction in \"neutral\"l spine defined as position of comfort relative to the lumbar lordosis Prone on elbows - decreased bilat UE symptoms, quadruped rocking - no pain with hip hinge  AAROM with cane IR (HEP) Hip hinge - reduced pain when spinal alignment is maintained    Quadruped rock with flexion and hip hinge - best with hip hinge Posterior step with bilat reach to lower leg - increased bilat UE complaints of burning - standing manual traction - decreased UE symptoms - right relief > left Segmental thoracolumbar flexion in standing: 10 reps    Sitting lumbar flexion: 10 second hold x 5 Supine leg slide within \"neutral\" spine 20 reps x each LE    Supine unilateral lower extremity \"neutral\" spine march with contralateral LE support 20 reps each LE    Supine march without contralateral support (up, up, down, down) within \"neutral\" spine x 15 reps each LE    Supine abdominal isometric with 55 cm stability ball: 5 second hold x 20 reps Prone MT 10 reps x 2  Standing bilat shoulder exten red band 10 reps x 2 (HEP) Cervical isometrics, rhythmic stabilization - good    PROM right and left hip in supine - good Cervical isometrics - good no changes   Segmental thoracolumbar flexion in standing: 10 reps    Sitting lumbar flexion: 10 second hold x 5 Manual therapy 15 minutes : soft tissue release C/T/L fascia - no acute tenderness - tolerated well  Attempted SL thoracic mobility - increased cervical pain TRX bilateral shoulder flexion 10 reps   Bent knee fallout right LE initially mild pain - contract/relax eliminated pain  Rhythmic stabilization contact points pelvis/shoulders - good   Seated mid-thoracic stretch: 10 second hold x 5 reps    AROM cervical rotation with supported sitting and neutral spine with scapular setting. Thoracic mobility in standing with bilat UE /closed chain hip IR 10 reps each  TRX squats 10 reps   SL thoracic rotation with rib grasp 10 reps each  Manual therapy 10 min: static support cervical lordosis - reduces symptoms, suboccipital release/contact increases bilat UE symptoms. Manual traction supine reduces UE symptoms. Better reduction in symptoms with traction in standing   Manual therapy x 15 minutes    Joint mobilization consisting of physiological and accessory motion to cervical spine with grades based on end-feel on response, emphasis placed on up glides to facilitation rotation ______________  Neuro re-ed   Hip hinge training, additional postural training 10 minutes  Leg load with bracing through UE, bent knee fallouts with bracing HEP UE neural glide with cervical spine neutral    SL rhythmic stabilization 5 reps each right and left SL  HEP addition D2 flexion with yellow band - patient to monitor for symptoms  HEP: Segmental trunk flexion x 10 reps x 1 daily    Sitting flexion 10 seconds x 5 reps as needed for symptom modulation HEP:   Segmental trunk flexion x 10 reps x 1 daily    Sitting flexion 10 seconds x 5 reps as needed for symptom modulation    Mid thoracic stretch x 10 second hold x 5 reps    \"Neutral spine\" with respect to sitting and standing postures. Activity as tolerated within comfort. Leg load with bracing through UE, bent knee fallouts with bracing     SL quad stretch 20 seconds x 3 reps each              Charges:   TherEx 3   Total Timed Treatment:  45 min    Total Treatment Time: 45 min

## 2023-10-11 ENCOUNTER — OFFICE VISIT (OUTPATIENT)
Dept: PHYSICAL THERAPY | Age: 53
End: 2023-10-11
Attending: ORTHOPAEDIC SURGERY
Payer: COMMERCIAL

## 2023-10-11 PROCEDURE — 97110 THERAPEUTIC EXERCISES: CPT | Performed by: PHYSICAL THERAPIST

## 2023-10-11 NOTE — PROGRESS NOTES
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 Objective:   No pain with cervical isometrics   Neural tension right and left UE increased with attempt at SB, cervical rotation   Cervical flexion increased right and left UE symptoms   Lateral shift right cervical spine   2 pound right and left UE  overhead lift - max. Assessment:   Bilat UE symptoms right lumbar pain are impacted by cervical alignment. With cervical lordosis supported able to increase UE ROM. Patient continues with anti-inflammatory medication. Will contact physician office to discuss progress and medication         Plan    As above. Continue PT 2x/wk     Date: 9/18/2023   Tx#: 5/ Date: 9/20/2023   Tx#: 6/12 Date: 9/26/2023  Tx#: 7/12 Date: 9/28/2023  Tx#:  8/12   Date: 10/2/2023   Tx#: 9/12  Date: 10/4/2023   Tx#: 10/24  New prescription received spine, right shoulder Date: 10/9/2023   Tx#: 11/24    Date: 10/11/2023   Tx#: 12/24   TherEx TherEx TherEx TherEx TherEx TherEx TherEx TherEx   Elliptical 6 minutes  Elliptical 6 minutes  Treadmill x 20 minutes self selected brisk pace.  Prone shoulder extension 10 reps x 2 Treadmill 8 minutes - right side lumbar pain with cervical flexion  Elliptical 8 minutes  Elliptical 8 minutes   TRX squats 10 reps x 2  Same side reach in split stance with SB 10 reps each - decreased stride to limit lumbar pain - steady gaze forward Supine lower trunk rotation bilaterally x 15 reps    Supine single knee to chest stretch x 20  Supine lower trunk rotation bilaterally x 15 reps    Supine single knee to chest stretch x 20 S/L trunk rotation x 15 reps each direction Prone hip extension 10 reps each, review of stab principles   TRX squats 10 reps x 2  Forward step with same side reach, forward step SB overhead lift, closed chain hip IR right and left LE Braiding, closed chain hip IR, high knees    Hip hinge training - unable to perform  Anterior step with spinal extension 10 reps each - good  Supine 90/90 neural mobilization x 20 each LE    UE neural mobilization bilaterally  Supine 90/90 neural mobilization x 20 each LE    Supine posterior and anterior pelvic rotation with instruction in \"neutral\"l spine defined as position of comfort relative to the lumbar lordosis Prone on elbows - decreased bilat UE symptoms, quadruped rocking - no pain with hip hinge  AAROM with cane IR (HEP) Hip hinge - reduced pain when spinal alignment is maintained  Side step with isometric hold superband    Quadruped rock with flexion and hip hinge - best with hip hinge Posterior step with bilat reach to lower leg - increased bilat UE complaints of burning - standing manual traction - decreased UE symptoms - right relief > left Segmental thoracolumbar flexion in standing: 10 reps    Sitting lumbar flexion: 10 second hold x 5 Supine leg slide within \"neutral\" spine 20 reps x each LE    Supine unilateral lower extremity \"neutral\" spine march with contralateral LE support 20 reps each LE    Supine march without contralateral support (up, up, down, down) within \"neutral\" spine x 15 reps each LE    Supine abdominal isometric with 55 cm stability ball: 5 second hold x 20 reps Prone MT 10 reps x 2  Standing bilat shoulder exten red band 10 reps x 2 (HEP) Cervical isometrics, rhythmic stabilization - good  One arm forward press ambulation > 30 feet x 2 right and left UE   PROM right and left hip in supine - good Cervical isometrics - good no changes   Segmental thoracolumbar flexion in standing: 10 reps    Sitting lumbar flexion: 10 second hold x 5 Manual therapy 15 minutes : soft tissue release C/T/L fascia - no acute tenderness - tolerated well  Attempted SL thoracic mobility - increased cervical pain TRX bilateral shoulder flexion 10 reps 2 pound alternate overhead press split stance 10 reps x 2 each    Bent knee fallout right LE initially mild pain - contract/relax eliminated pain  Rhythmic stabilization contact points pelvis/shoulders - good   Seated mid-thoracic stretch: 10 second hold x 5 reps    AROM cervical rotation with supported sitting and neutral spine with scapular setting. Thoracic mobility in standing with bilat UE /closed chain hip IR 10 reps each  TRX squats 10 reps Cervical isometrics    SL thoracic rotation with rib grasp 10 reps each  Manual therapy 10 min: static support cervical lordosis - reduces symptoms, suboccipital release/contact increases bilat UE symptoms. Manual traction supine reduces UE symptoms. Better reduction in symptoms with traction in standing   Manual therapy x 15 minutes    Joint mobilization consisting of physiological and accessory motion to cervical spine with grades based on end-feel on response, emphasis placed on up glides to facilitation rotation ______________  Neuro re-ed   Hip hinge training, additional postural training 10 minutes  Leg load with bracing through UE, bent knee fallouts with bracing HEP UE neural glide with cervical spine neutral  Right and left UE nerve glide with cervical lordosis supported - unable to rotate or SB cervical spine secondary to increased UE symptoms.     SL rhythmic stabilization 5 reps each right and left SL  HEP addition D2 flexion with yellow band - patient to monitor for symptoms  HEP: Segmental trunk flexion x 10 reps x 1 daily    Sitting flexion 10 seconds x 5 reps as needed for symptom modulation HEP:   Segmental trunk flexion x 10 reps x 1 daily    Sitting flexion 10 seconds x 5 reps as needed for symptom modulation    Mid thoracic stretch x 10 second hold x 5 reps    \"Neutral spine\" with respect to sitting and standing postures. Activity as tolerated within comfort. Leg load with bracing through UE, bent knee fallouts with bracing      SL quad stretch 20 seconds x 3 reps each               Charges:   TherEx 3   Total Timed Treatment:  45 min    Total Treatment Time: 45 min

## 2023-10-13 ENCOUNTER — TELEPHONE (OUTPATIENT)
Dept: PHYSICAL THERAPY | Age: 53
End: 2023-10-13

## 2023-10-18 ENCOUNTER — APPOINTMENT (OUTPATIENT)
Dept: PHYSICAL THERAPY | Age: 53
End: 2023-10-18
Attending: ORTHOPAEDIC SURGERY
Payer: COMMERCIAL

## 2023-10-19 ENCOUNTER — OFFICE VISIT (OUTPATIENT)
Dept: PHYSICAL THERAPY | Age: 53
End: 2023-10-19
Attending: ORTHOPAEDIC SURGERY
Payer: COMMERCIAL

## 2023-10-19 PROCEDURE — 97110 THERAPEUTIC EXERCISES: CPT

## 2023-10-19 PROCEDURE — 97140 MANUAL THERAPY 1/> REGIONS: CPT

## 2023-10-19 NOTE — PROGRESS NOTES
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 Describe the area as being posterior verse anterior aspect (reference anatomical position) of both forearms with symptoms of the hands (primarily palmar aspect on 1st - 3rd as compared 4th - 5th digits). Symptoms can be of variable intensity. Describes hyperalgesia. Has started prednisone (medrol-dose pack) a few does ago. Has not noticed any change. Can have neck stiffness. Denies weakness. Symptoms present over since injury. Seen by other relative to shoulder. Recommendations for conservative management. Seen by neuro recommendation for conservative management. Pain 3/10. Objective:  10/19/2023  -Symptom reproduction with PA @ T3, T4, T5  10/11/2023  No pain with cervical isometrics   Neural tension right and left UE increased with attempt at SB, cervical rotation   Cervical flexion increased right and left UE symptoms   Lateral shift right cervical spine   2 pound right and left UE  overhead lift - max. Assessment: Patient participatory in sessions; last seen by this therapists 09/28/202; noting improved general mobility such as with movement transitions. Distal upper extremity complaints appear consistent of T4 syndrome. Trial of joint mobilization this session with recommendations for side lying trunk rotation. Plan  -Remain at present frequency; active range of motion; trunk and shoulder mobility; upper quarter resisted exercise (RTC).     Date: 9/18/2023   Tx#: 5/ Date: 9/20/2023   Tx#: 6/12 Date: 9/26/2023  Tx#: 7/12 Date: 9/28/2023  Tx#:  8/12   Date: 10/2/2023   Tx#: 9/12  Date: 10/4/2023   Tx#: 10/24  New prescription received spine, right shoulder Date: 10/9/2023   Tx#: 11/24    Date: 10/11/2023   Tx#: 12/24 Date: [id number]  Tx#:  13/24     TherEx TherEx TherEx TherEx TherEx TherEx TherEx TherEx TherEx   Elliptical 6 minutes  Elliptical 6 minutes  Treadmill x 20 minutes self selected brisk pace.  Prone shoulder extension 10 reps x 2 Treadmill 8 minutes - right side lumbar pain with cervical flexion  Elliptical 8 minutes  Elliptical 8 minutes ______________   TRX squats 10 reps x 2  Same side reach in split stance with SB 10 reps each - decreased stride to limit lumbar pain - steady gaze forward Supine lower trunk rotation bilaterally x 15 reps    Supine single knee to chest stretch x 20  Supine lower trunk rotation bilaterally x 15 reps    Supine single knee to chest stretch x 20     S/L trunk rotation x 15 reps each direction Prone hip extension 10 reps each, review of stab principles   TRX squats 10 reps x 2  Forward step with same side reach, forward step SB overhead lift, closed chain hip IR right and left LE Braiding, closed chain hip IR, high knees  Supine lower trunk rotation bilaterally x 15 reps    Supine single knee to chest stretch x 20     S/L trunk rotation x 15 reps each direction   Hip hinge training - unable to perform  Anterior step with spinal extension 10 reps each - good  Supine 90/90 neural mobilization x 20 each LE    UE neural mobilization bilaterally  Supine 90/90 neural mobilization x 20 each LE    Supine posterior and anterior pelvic rotation with instruction in \"neutral\"l spine defined as position of comfort relative to the lumbar lordosis Prone on elbows - decreased bilat UE symptoms, quadruped rocking - no pain with hip hinge  AAROM with cane IR (HEP) Hip hinge - reduced pain when spinal alignment is maintained  Side step with isometric hold superband  Supine 90/90 neural mobilization x 20 each LE    Supine posterior and anterior pelvic rotation with instruction in \"neutral\"l spine defined as position of comfort relative to the lumbar lordosis   Quadruped rock with flexion and hip hinge - best with hip hinge Posterior step with bilat reach to lower leg - increased bilat UE complaints of burning - standing manual traction - decreased UE symptoms - right relief > left Segmental thoracolumbar flexion in standing: 10 reps    Sitting lumbar flexion: 10 second hold x 5 Supine leg slide within \"neutral\" spine 20 reps x each LE    Supine unilateral lower extremity \"neutral\" spine march with contralateral LE support 20 reps each LE    Supine march without contralateral support (up, up, down, down) within \"neutral\" spine x 15 reps each LE    Supine abdominal isometric with 55 cm stability ball: 5 second hold x 20 reps Prone MT 10 reps x 2  Standing bilat shoulder exten red band 10 reps x 2 (HEP) Cervical isometrics, rhythmic stabilization - good  One arm forward press ambulation > 30 feet x 2 right and left UE AROM cervical rotation with supported sitting and neutral spine with scapular setting. PROM right and left hip in supine - good Cervical isometrics - good no changes   Segmental thoracolumbar flexion in standing: 10 reps    Sitting lumbar flexion: 10 second hold x 5 Manual therapy 15 minutes : soft tissue release C/T/L fascia - no acute tenderness - tolerated well  Attempted SL thoracic mobility - increased cervical pain TRX bilateral shoulder flexion 10 reps 2 pound alternate overhead press split stance 10 reps x 2 each  Manual Therapy x 15 minutes    -Central, unilateral, transverse mobilizastion upper and mid-t-spine within prone and side lying, followed by S/L trunk rotation.    Bent knee fallout right LE initially mild pain - contract/relax eliminated pain  Rhythmic stabilization contact points pelvis/shoulders - good   Seated mid-thoracic stretch: 10 second hold x 5 reps    AROM cervical rotation with supported sitting and neutral spine with scapular setting. Thoracic mobility in standing with bilat UE /closed chain hip IR 10 reps each  TRX squats 10 reps Cervical isometrics     SL thoracic rotation with rib grasp 10 reps each  Manual therapy 10 min: static support cervical lordosis - reduces symptoms, suboccipital release/contact increases bilat UE symptoms. Manual traction supine reduces UE symptoms. Better reduction in symptoms with traction in standing   Manual therapy x 15 minutes    Joint mobilization consisting of physiological and accessory motion to cervical spine with grades based on end-feel on response, emphasis placed on up glides to facilitation rotation ______________  Neuro re-ed   Hip hinge training, additional postural training 10 minutes  Leg load with bracing through UE, bent knee fallouts with bracing HEP UE neural glide with cervical spine neutral  Right and left UE nerve glide with cervical lordosis supported - unable to rotate or SB cervical spine secondary to increased UE symptoms. SL rhythmic stabilization 5 reps each right and left SL  HEP addition D2 flexion with yellow band - patient to monitor for symptoms  HEP: Segmental trunk flexion x 10 reps x 1 daily    Sitting flexion 10 seconds x 5 reps as needed for symptom modulation HEP:   Segmental trunk flexion x 10 reps x 1 daily    Sitting flexion 10 seconds x 5 reps as needed for symptom modulation    Mid thoracic stretch x 10 second hold x 5 reps    \"Neutral spine\" with respect to sitting and standing postures. Activity as tolerated within comfort. Leg load with bracing through UE, bent knee fallouts with bracing       SL quad stretch 20 seconds x 3 reps each                Charges:   Therapeutic excercise x 2  Manual Therapy x 1  Total Timed Treatment:  45 min    Total Treatment Time: 45 min

## 2023-10-23 ENCOUNTER — OFFICE VISIT (OUTPATIENT)
Dept: PHYSICAL THERAPY | Age: 53
End: 2023-10-23
Attending: ORTHOPAEDIC SURGERY
Payer: COMMERCIAL

## 2023-10-23 PROCEDURE — 97110 THERAPEUTIC EXERCISES: CPT | Performed by: PHYSICAL THERAPIST

## 2023-10-23 PROCEDURE — 97112 NEUROMUSCULAR REEDUCATION: CPT | Performed by: PHYSICAL THERAPIST

## 2023-10-23 NOTE — PROGRESS NOTES
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 Objective:  Lateral shift cervical spine right. Neural tension positive right and left UE in supine. Symptoms eliminated with cervical lordosis support. Cervical flexion in supine - bilat UE symptoms. Spinal forward flexion - no pain with spinal flexion prior to motion. Cervical isometrics - good. 10 pound suitcase hold right UE = left UE symptoms   Bilateral UE/LE strength WNL. Right UE symptoms provoked with left bent knee fallout and leg load. Stabilization prior to movement eliminates pain   SLR - negative, slump positive for UE symptoms     Assessment:   Patient did not respond to medrol dose kelsey,  Overall strength and ROM are WNL. Lumbar pain appears associated with cervical spine. Stabilization training and extension exercise continue to provide relief. Position change supine to sitting - bilat UE symptoms. Patient is no longer utilizing UE support of cervical spine with position changes. Manual support to correct slight lateral shift right increases UE symptoms.   Primary problems: Difficulty with position changes, bilat UE symptoms primarily provoked with slumped sitting/position changes, difficulty performing ADLS secondary to pain       Plan  PT 2x/wk progressive exercise    Date: 10/2/2023   Tx#: 9/12  Date: 10/4/2023   Tx#: 10/24  New prescription received spine, right shoulder Date: 10/9/2023   Tx#: 11/24    Date: 10/11/2023   Tx#: 12/24 Date: 10/[id number]  Tx#:  13/24   Date: 10/23/2023      TherEx TherEx TherEx TherEx TherEx TherEx    Prone shoulder extension 10 reps x 2 Treadmill 8 minutes - right side lumbar pain with cervical flexion  Elliptical 8 minutes  Elliptical 8 minutes ______________ Cervical isometrics all directions - good    Prone hip extension 10 reps each, review of stab principles   TRX squats 10 reps x 2  Forward step with same side reach, forward step SB overhead lift, closed chain hip IR right and left LE Braiding, closed chain hip IR, high knees  Supine lower trunk rotation bilaterally x 15 reps    Supine single knee to chest stretch x 20     S/L trunk rotation x 15 reps each direction SL thoracic mobility 10 reps each. Right and left scap passive mobility, supine cervical rotation right and left with support at lordosis, no support at lordosis = pain    Prone on elbows - decreased bilat UE symptoms, quadruped rocking - no pain with hip hinge  AAROM with cane IR (HEP) Hip hinge - reduced pain when spinal alignment is maintained  Side step with isometric hold superband  Supine 90/90 neural mobilization x 20 each LE    Supine posterior and anterior pelvic rotation with instruction in \"neutral\"l spine defined as position of comfort relative to the lumbar lordosis Prone press up - reduces cervical and lumbar pain    Prone MT 10 reps x 2  Standing bilat shoulder exten red band 10 reps x 2 (HEP) Cervical isometrics, rhythmic stabilization - good  One arm forward press ambulation > 30 feet x 2 right and left UE AROM cervical rotation with supported sitting and neutral spine with scapular setting.  Neuro re-ed 30 minutes  Quadruped UE reaches, LE reaches, UE/LE reaches    Manual therapy 15 minutes : soft tissue release C/T/L fascia - no acute tenderness - tolerated well  Attempted SL thoracic mobility - increased cervical pain TRX bilateral shoulder flexion 10 reps 2 pound alternate overhead press split stance 10 reps x 2 each  Manual Therapy x 15 minutes    -Central, unilateral, transverse mobilizastion upper and mid-t-spine within prone and side lying, followed by S/L trunk rotation. Bent knee fallouts, left leg load - right UE symptoms - bracing prior to motion - no pain     Thoracic mobility in standing with bilat UE /closed chain hip IR 10 reps each  TRX squats 10 reps Cervical isometrics   Transverse plane isometrics in hooklying - good     Neuro re-ed   Hip hinge training, additional postural training 10 minutes  Leg load with bracing through UE, bent knee fallouts with bracing HEP UE neural glide with cervical spine neutral  Right and left UE nerve glide with cervical lordosis supported - unable to rotate or SB cervical spine secondary to increased UE symptoms. Deep neck flexors - bilat UE symptoms     Leg load with bracing through UE, bent knee fallouts with bracing     Postural training                 Charges:   Therapeutic excercise x 2  Neuro Re-ed  Total Timed Treatment:  50 min    Total Treatment Time: 50  min

## 2023-10-25 ENCOUNTER — OFFICE VISIT (OUTPATIENT)
Dept: PHYSICAL THERAPY | Age: 53
End: 2023-10-25
Attending: ORTHOPAEDIC SURGERY
Payer: COMMERCIAL

## 2023-10-25 PROCEDURE — 97110 THERAPEUTIC EXERCISES: CPT | Performed by: PHYSICAL THERAPIST

## 2023-10-25 NOTE — PROGRESS NOTES
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 Objective:  HEP additions as noted  Cervical isometrics - good strength - no pain  Cervical flexion/spinal flexion bilat UE symptoms, right side lumbar pain       Assessment:   Patient tolerated exercise fairly well. She continues to have difficulty with controlling for UE symptoms. She tolerated light weight training well       Plan  PT 2x/wk progressive exercise    Date: 10/2/2023   Tx#: 9/12  Date: 10/4/2023   Tx#: 10/24  New prescription received spine, right shoulder Date: 10/9/2023   Tx#: 11/24    Date: 10/11/2023   Tx#: 12/24 Date: 10/928060  Tx#:  13/24   Date: 10/23/2023    Date: 10/25/2023      TherEx TherEx TherEx TherEx TherEx TherEx  TherEx   Prone shoulder extension 10 reps x 2 Treadmill 8 minutes - right side lumbar pain with cervical flexion  Elliptical 8 minutes  Elliptical 8 minutes ______________ Cervical isometrics all directions - good  Elliptical 8 minutes    Prone hip extension 10 reps each, review of stab principles   TRX squats 10 reps x 2  Forward step with same side reach, forward step SB overhead lift, closed chain hip IR right and left LE Braiding, closed chain hip IR, high knees  Supine lower trunk rotation bilaterally x 15 reps    Supine single knee to chest stretch x 20     S/L trunk rotation x 15 reps each direction SL thoracic mobility 10 reps each.  Right and left scap passive mobility, supine cervical rotation right and left with support at lordosis, no support at lordosis = pain  TRX retraction 10 reps x 2    Prone on elbows - decreased bilat UE symptoms, quadruped rocking - no pain with hip hinge  AAROM with cane IR (HEP) Hip hinge - reduced pain when spinal alignment is maintained  Side step with isometric hold superband  Supine 90/90 neural mobilization x 20 each LE    Supine posterior and anterior pelvic rotation with instruction in \"neutral\"l spine defined as position of comfort relative to the lumbar lordosis Prone press up - reduces cervical and lumbar pain  TRX squats 10 reps x 2    Prone MT 10 reps x 2  Standing bilat shoulder exten red band 10 reps x 2 (HEP) Cervical isometrics, rhythmic stabilization - good  One arm forward press ambulation > 30 feet x 2 right and left UE AROM cervical rotation with supported sitting and neutral spine with scapular setting. Neuro re-ed 30 minutes  Quadruped UE reaches, LE reaches, UE/LE reaches  Bilat pulldown 48 pounds > 20     Manual therapy 15 minutes : soft tissue release C/T/L fascia - no acute tenderness - tolerated well  Attempted SL thoracic mobility - increased cervical pain TRX bilateral shoulder flexion 10 reps 2 pound alternate overhead press split stance 10 reps x 2 each  Manual Therapy x 15 minutes    -Central, unilateral, transverse mobilizastion upper and mid-t-spine within prone and side lying, followed by S/L trunk rotation.  Bent knee fallouts, left leg load - right UE symptoms - bracing prior to motion - no pain  4 pound medicine ball overhead lift 10 reps     Thoracic mobility in standing with bilat UE /closed chain hip IR 10 reps each  TRX squats 10 reps Cervical isometrics   Transverse plane isometrics in hooklying - good  HEP forward step with bilat same side reach, forward step and return     Neuro re-ed   Hip hinge training, additional postural training 10 minutes  Leg load with bracing through UE, bent knee fallouts with bracing HEP UE neural glide with cervical spine neutral  Right and left UE nerve glide with cervical lordosis supported - unable to rotate or SB cervical spine secondary to increased UE symptoms. Deep neck flexors - bilat UE symptoms  Medicordz resisted side step, ambulation    Leg load with bracing through UE, bent knee fallouts with bracing     Postural training   One arm press superband ambulate > 20 feet x 2 right and left UE          Side step with superband        Charges:   Therapeutic excercise x 3    Total Timed Treatment:  50 min    Total Treatment Time: 50  min

## 2023-10-30 ENCOUNTER — OFFICE VISIT (OUTPATIENT)
Dept: PHYSICAL THERAPY | Age: 53
End: 2023-10-30
Attending: ORTHOPAEDIC SURGERY
Payer: COMMERCIAL

## 2023-10-30 PROCEDURE — 97140 MANUAL THERAPY 1/> REGIONS: CPT | Performed by: PHYSICAL THERAPIST

## 2023-10-30 PROCEDURE — 97110 THERAPEUTIC EXERCISES: CPT | Performed by: PHYSICAL THERAPIST

## 2023-10-30 NOTE — PROGRESS NOTES
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 Objective:  Treatment session focus on positioning/manual therapy to reduce UE symptoms  Position changes rolling and sit to from supine - pain in bilat UES  Reduction in UE symptoms with prone on elbows  Manual support cervical lordosis, right SB with lordosis support. Thoracic unilateral PAS left - UE symptoms       Assessment:   Patient able to perform full bilat elbow extension/wrist extension at 90 abduction bilat shoulders when lordosis is supported. Supine manual traction provides no relief. UE ROM is WNL.  Difficulty with position changes appears related to decreased reaction/timing of spinal stabilizers       Plan  PT 2x/wk progressive exercise    Date: 10/2/2023   Tx#: 9/12  Date: 10/4/2023   Tx#: 10/24  New prescription received spine, right shoulder Date: 10/9/2023   Tx#: 11/24    Date: 10/11/2023   Tx#: 12/24 Date: 10/290735  Tx#:  13/24   Date: 10/23/2023    Date: 10/25/2023    Date: 10/30/2023   16/24   TherEx TherEx TherEx TherEx TherEx TherEx  TherEx TherEx   Prone shoulder extension 10 reps x 2 Treadmill 8 minutes - right side lumbar pain with cervical flexion  Elliptical 8 minutes  Elliptical 8 minutes ______________ Cervical isometrics all directions - good  Elliptical 8 minutes  Same side thoracic mobility 10 reps each split stance    Prone hip extension 10 reps each, review of stab principles   TRX squats 10 reps x 2  Forward step with same side reach, forward step SB overhead lift, closed chain hip IR right and left LE Braiding, closed chain hip IR, high knees  Supine lower trunk rotation bilaterally x 15 reps    Supine single knee to chest stretch x 20     S/L trunk rotation x 15 reps each direction SL thoracic mobility 10 reps each. Right and left scap passive mobility, supine cervical rotation right and left with support at lordosis, no support at lordosis = pain  TRX retraction 10 reps x 2  Rhythmic stabilization contact pelvis and shoulders 10 reps x 2,  Transverse plane isometrics through UE split stance    Prone on elbows - decreased bilat UE symptoms, quadruped rocking - no pain with hip hinge  AAROM with cane IR (HEP) Hip hinge - reduced pain when spinal alignment is maintained  Side step with isometric hold superband  Supine 90/90 neural mobilization x 20 each LE    Supine posterior and anterior pelvic rotation with instruction in \"neutral\"l spine defined as position of comfort relative to the lumbar lordosis Prone press up - reduces cervical and lumbar pain  TRX squats 10 reps x 2  Prone one elbows alternate reaches 10 reps each    Prone MT 10 reps x 2  Standing bilat shoulder exten red band 10 reps x 2 (HEP) Cervical isometrics, rhythmic stabilization - good  One arm forward press ambulation > 30 feet x 2 right and left UE AROM cervical rotation with supported sitting and neutral spine with scapular setting. Neuro re-ed 30 minutes  Quadruped UE reaches, LE reaches, UE/LE reaches  Bilat pulldown 48 pounds > 20   Manual therapy 30 minutes:  Right SB with support cervical lordosis. UE AROM with manual support at lordosis.  Cervical flexion with manual support, manual pressure during spinal extension    Manual therapy 15 minutes : soft tissue release C/T/L fascia - no acute tenderness - tolerated well  Attempted SL thoracic mobility - increased cervical pain TRX bilateral shoulder flexion 10 reps 2 pound alternate overhead press split stance 10 reps x 2 each  Manual Therapy x 15 minutes    -Central, unilateral, transverse mobilizastion upper and mid-t-spine within prone and side lying, followed by S/L trunk rotation. Bent knee fallouts, left leg load - right UE symptoms - bracing prior to motion - no pain  4 pound medicine ball overhead lift 10 reps      Thoracic mobility in standing with bilat UE /closed chain hip IR 10 reps each  TRX squats 10 reps Cervical isometrics   Transverse plane isometrics in hooklying - good  HEP forward step with bilat same side reach, forward step and return      Neuro re-ed   Hip hinge training, additional postural training 10 minutes  Leg load with bracing through UE, bent knee fallouts with bracing HEP UE neural glide with cervical spine neutral  Right and left UE nerve glide with cervical lordosis supported - unable to rotate or SB cervical spine secondary to increased UE symptoms. Deep neck flexors - bilat UE symptoms  Medicordz resisted side step, ambulation     Leg load with bracing through UE, bent knee fallouts with bracing     Postural training   One arm press superband ambulate > 20 feet x 2 right and left UE           Side step with superband         Charges:   Therapeutic exercise, manual therapy 2    Total Timed Treatment:  45 min    Total Treatment Time: 45  min

## 2023-11-01 ENCOUNTER — OFFICE VISIT (OUTPATIENT)
Dept: PHYSICAL THERAPY | Age: 53
End: 2023-11-01
Attending: ORTHOPAEDIC SURGERY
Payer: COMMERCIAL

## 2023-11-01 PROCEDURE — 97112 NEUROMUSCULAR REEDUCATION: CPT | Performed by: PHYSICAL THERAPIST

## 2023-11-01 PROCEDURE — 97110 THERAPEUTIC EXERCISES: CPT | Performed by: PHYSICAL THERAPIST

## 2023-11-01 NOTE — PROGRESS NOTES
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 Very little burning in right arm. Left arm still has more burning than the right   Pain 4/10  Objective:  Rolling supine to and right and left SL - no UE symptoms during the transition. UE symptoms provoked in left SL and supine. With support at cervical lordosis, symptoms eliminated          Assessment:   Decreased guarding with rolling/position changes. Support at cervical lordosis for reduction in UE symptoms as noted. Plan  PT 2x/wk progressive exercise    Date: 10/11/2023   Tx#: 12/24 Date: 10/[id number]  Tx#:  13/24   Date: 10/23/2023    Date: 10/25/2023    Date: 10/30/2023   16/24 Date: 11/1/2023 17/24   TherEx TherEx TherEx  TherEx TherEx TherEx   Elliptical 8 minutes ______________ Cervical isometrics all directions - good  Elliptical 8 minutes  Same side thoracic mobility 10 reps each split stance  Elliptical 8 minutes    Braiding, closed chain hip IR, high knees  Supine lower trunk rotation bilaterally x 15 reps    Supine single knee to chest stretch x 20     S/L trunk rotation x 15 reps each direction SL thoracic mobility 10 reps each. Right and left scap passive mobility, supine cervical rotation right and left with support at lordosis, no support at lordosis = pain  TRX retraction 10 reps x 2  Rhythmic stabilization contact pelvis and shoulders 10 reps x 2,  Transverse plane isometrics through UE split stance  Supine to right and left SL 10 reps each - no pain during transition.  Left SL symptoms without cervical lordosis support    Side step with isometric hold superband  Supine 90/90 neural mobilization x 20 each LE    Supine posterior and anterior pelvic rotation with instruction in \"neutral\"l spine defined as position of comfort relative to the lumbar lordosis Prone press up - reduces cervical and lumbar pain  TRX squats 10 reps x 2  Prone one elbows alternate reaches 10 reps each  SL thoracic rotation 10 reps each     LTR with cervical rotation opposite support at cervical lordosis     Right and left LE knee to chest, hamstring mobility with DF PROM    One arm forward press ambulation > 30 feet x 2 right and left UE AROM cervical rotation with supported sitting and neutral spine with scapular setting. Neuro re-ed 30 minutes  Quadruped UE reaches, LE reaches, UE/LE reaches  Bilat pulldown 48 pounds > 20   Manual therapy 30 minutes:  Right SB with support cervical lordosis. UE AROM with manual support at lordosis. Cervical flexion with manual support, manual pressure during spinal extension  Neuro re-ed   SL rhythmic stabilization contacts pelvis/shoulders 10 reps each right and left     Postural training in standing   Rhythmic stabilization contact points pelvis/shoulders 10 reps each   2 pound alternate overhead press split stance 10 reps x 2 each  Manual Therapy x 15 minutes    -Central, unilateral, transverse mobilizastion upper and mid-t-spine within prone and side lying, followed by S/L trunk rotation. Bent knee fallouts, left leg load - right UE symptoms - bracing prior to motion - no pain  4 pound medicine ball overhead lift 10 reps      Cervical isometrics   Transverse plane isometrics in hooklying - good  HEP forward step with bilat same side reach, forward step and return      Right and left UE nerve glide with cervical lordosis supported - unable to rotate or SB cervical spine secondary to increased UE symptoms.    Deep neck flexors - bilat UE symptoms  Medicordz resisted side step, ambulation       Postural training   One arm press superband ambulate > 20 feet x 2 right and left UE         Side step with superband          Charges:   Therapeutic exercise 2, neuro re-ed     Total Timed Treatment:  40 min    Total Treatment Time: 40 min

## 2023-11-02 ENCOUNTER — APPOINTMENT (OUTPATIENT)
Dept: PHYSICAL THERAPY | Age: 53
End: 2023-11-02
Attending: ORTHOPAEDIC SURGERY
Payer: COMMERCIAL

## 2023-11-06 ENCOUNTER — OFFICE VISIT (OUTPATIENT)
Dept: PHYSICAL THERAPY | Age: 53
End: 2023-11-06
Attending: ORTHOPAEDIC SURGERY
Payer: COMMERCIAL

## 2023-11-06 PROCEDURE — 97110 THERAPEUTIC EXERCISES: CPT | Performed by: PHYSICAL THERAPIST

## 2023-11-06 NOTE — PROGRESS NOTES
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 Patient states pain was increased walking with her     Pain 4/10  Objective:  Right side lumbar pain eliminate via support at cervical lordosis. LE neural tension - negative   Position changes supine to sitting - UE symptoms    Patient instructed to take towel with her to perform supported cervical extension if right lumbar pain/UE symptoms developed. Also, instructed to avoid over striding as impact is increased       Assessment:   Cervical spine appears to continue as the primary source of lumbar as well as UE symptoms. She is demonstrating improved tolerance for full ROM bilat UE, decrease in neural tension symptoms. When symptoms are present, able to decrease with manual traction standing/sitting. Supine relieved with support at cervical lordosis     Plan  PT 2x/wk progressive exercise    Date: 10/11/2023   Tx#: 12/24 Date: 10/949251  Tx#:  13/24   Date: 10/23/2023    Date: 10/25/2023    Date: 10/30/2023   16/24 Date: 11/1/2023 17/24 Date: 11/6/2023 18/24   TherEx TherEx TherEx  TherEx TherEx TherEx TherEx   Elliptical 8 minutes ______________ Cervical isometrics all directions - good  Elliptical 8 minutes  Same side thoracic mobility 10 reps each split stance  Elliptical 8 minutes  Elliptical 8 minutes    Braiding, closed chain hip IR, high knees  Supine lower trunk rotation bilaterally x 15 reps    Supine single knee to chest stretch x 20     S/L trunk rotation x 15 reps each direction SL thoracic mobility 10 reps each.  Right and left scap passive mobility, supine cervical rotation right and left with support at lordosis, no support at lordosis = pain  TRX retraction 10 reps x 2  Rhythmic stabilization contact pelvis and shoulders 10 reps x 2,  Transverse plane isometrics through UE split stance  Supine to right and left SL 10 reps each - no pain during transition. Left SL symptoms without cervical lordosis support  Closed chain hip IR UE , high knees, anterior/posterior chain UE   7-56 reps each    Side step with isometric hold superband  Supine 90/90 neural mobilization x 20 each LE    Supine posterior and anterior pelvic rotation with instruction in \"neutral\"l spine defined as position of comfort relative to the lumbar lordosis Prone press up - reduces cervical and lumbar pain  TRX squats 10 reps x 2  Prone one elbows alternate reaches 10 reps each  SL thoracic rotation 10 reps each     LTR with cervical rotation opposite support at cervical lordosis     Right and left LE knee to chest, hamstring mobility with DF PROM  TRX squats 10 reps x 2    One arm forward press ambulation > 30 feet x 2 right and left UE AROM cervical rotation with supported sitting and neutral spine with scapular setting. Neuro re-ed 30 minutes  Quadruped UE reaches, LE reaches, UE/LE reaches  Bilat pulldown 48 pounds > 20   Manual therapy 30 minutes:  Right SB with support cervical lordosis. UE AROM with manual support at lordosis. Cervical flexion with manual support, manual pressure during spinal extension  Neuro re-ed   SL rhythmic stabilization contacts pelvis/shoulders 10 reps each right and left     Postural training in standing   Rhythmic stabilization contact points pelvis/shoulders 10 reps each Supine - initial positioning - right side lumbar pain, pain. Support placed at cervical lordosis eliminated pain    PROM right and left LE - no pain, removed cervical support - no pain.        2 pound alternate overhead press split stance 10 reps x 2 each  Manual Therapy x 15 minutes    -Central, unilateral, transverse mobilizastion upper and mid-t-spine within prone and side lying, followed by S/L trunk rotation. Bent knee fallouts, left leg load - right UE symptoms - bracing prior to motion - no pain  4 pound medicine ball overhead lift 10 reps    Cervical isometrics ant/post/lat - good strength - no pain    Cervical isometrics   Transverse plane isometrics in hooklying - good  HEP forward step with bilat same side reach, forward step and return    Hip hinge - manual contact to assist with hip drive   Right and left UE nerve glide with cervical lordosis supported - unable to rotate or SB cervical spine secondary to increased UE symptoms. Deep neck flexors - bilat UE symptoms  Medicordz resisted side step, ambulation        Postural training   One arm press superband ambulate > 20 feet x 2 right and left UE          Side step with superband           Charges:   Therapeutic exercise 3    Total Timed Treatment:  45 min    Total Treatment Time: 45 min

## 2023-11-08 ENCOUNTER — OFFICE VISIT (OUTPATIENT)
Dept: PHYSICAL THERAPY | Age: 53
End: 2023-11-08
Attending: ORTHOPAEDIC SURGERY
Payer: COMMERCIAL

## 2023-11-08 PROCEDURE — 97112 NEUROMUSCULAR REEDUCATION: CPT | Performed by: PHYSICAL THERAPIST

## 2023-11-08 PROCEDURE — 97110 THERAPEUTIC EXERCISES: CPT | Performed by: PHYSICAL THERAPIST

## 2023-11-08 NOTE — PROGRESS NOTES
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 No difficulty with LE ROM, slight decrease in right hip figure four mobility   Ant/posterior pelvic tilt - painful, support at cervical lordosis reduced pain. Following LE PROM   UE ROM cannot be performed without support at lordosis in supine   Suitcase carry - LBP with 10 pounds. Farmer's carry 10 each - no pain   Sit to and from supine provokes UE complaints   Assessment:   Discussed progress with patient. Despite the ongoing pain, she feels PT is still benefiting her. Primary problems are UE complaints, difficulty with position changes as there are increased UE symptoms. Plan  PT 2x/wk progressive exercise    Date: 10/11/2023   Tx#: 12/24 Date: 10/[id number]  Tx#:  13/24   Date: 10/23/2023    Date: 10/25/2023    Date: 10/30/2023   16/24 Date: 11/1/2023 17/24 Date: 11/6/2023 18/24 Date: 11/8/2023 19/24    TherEx TherEx TherEx  TherEx TherEx TherEx TherEx TherEx   Elliptical 8 minutes ______________ Cervical isometrics all directions - good  Elliptical 8 minutes  Same side thoracic mobility 10 reps each split stance  Elliptical 8 minutes  Elliptical 8 minutes  Treadmill 6 minutes    Braiding, closed chain hip IR, high knees  Supine lower trunk rotation bilaterally x 15 reps    Supine single knee to chest stretch x 20     S/L trunk rotation x 15 reps each direction SL thoracic mobility 10 reps each.  Right and left scap passive mobility, supine cervical rotation right and left with support at lordosis, no support at lordosis = pain  TRX retraction 10 reps x 2  Rhythmic stabilization contact pelvis and shoulders 10 reps x 2,  Transverse plane isometrics through UE split stance  Supine to right and left SL 10 reps each - no pain during transition. Left SL symptoms without cervical lordosis support  Closed chain hip IR UE , high knees, anterior/posterior chain UE   5-08 reps each  Ant/posterior pelvic tilt - painful thoracic spine. With support at cervical lordosis - no pain     LE PROM - no pain with support at cervical lordosis   Right figure four slight decrease mobility 4 reps x 20 seconds right and left stretch    Side step with isometric hold superband  Supine 90/90 neural mobilization x 20 each LE    Supine posterior and anterior pelvic rotation with instruction in \"neutral\"l spine defined as position of comfort relative to the lumbar lordosis Prone press up - reduces cervical and lumbar pain  TRX squats 10 reps x 2  Prone one elbows alternate reaches 10 reps each  SL thoracic rotation 10 reps each     LTR with cervical rotation opposite support at cervical lordosis     Right and left LE knee to chest, hamstring mobility with DF PROM  TRX squats 10 reps x 2  Supine attempt right UE PROM - increased right UE symptoms. Support at lordosis minimal improvement as symptoms had increased significantly. Manual traction in upright - reduced symptoms   No pain with UE ROM including combined elbow extension with wrist flex/exten   One arm forward press ambulation > 30 feet x 2 right and left UE AROM cervical rotation with supported sitting and neutral spine with scapular setting. Neuro re-ed 30 minutes  Quadruped UE reaches, LE reaches, UE/LE reaches  Bilat pulldown 48 pounds > 20   Manual therapy 30 minutes:  Right SB with support cervical lordosis. UE AROM with manual support at lordosis.  Cervical flexion with manual support, manual pressure during spinal extension  Neuro re-ed   SL rhythmic stabilization contacts pelvis/shoulders 10 reps each right and left     Postural training in standing   Rhythmic stabilization contact points pelvis/shoulders 10 reps each Supine - initial positioning - right side lumbar pain, pain. Support placed at cervical lordosis eliminated pain    PROM right and left LE - no pain, removed cervical support - no pain. Cervical rotation decreased right and painful  - pain decreased with inferior glide right first rib. 2 pound alternate overhead press split stance 10 reps x 2 each  Manual Therapy x 15 minutes    -Central, unilateral, transverse mobilizastion upper and mid-t-spine within prone and side lying, followed by S/L trunk rotation. Bent knee fallouts, left leg load - right UE symptoms - bracing prior to motion - no pain  4 pound medicine ball overhead lift 10 reps    Cervical isometrics ant/post/lat - good strength - no pain  Neuro re-ed 10 minutes     Cervical isometrics, rhythmic stabilization contact points pelvis/shoulders, transverse plane isometrics through UE parallel and split stance,     10 pound suitcase carry thoracic pain - no pain with farmer's carry 10 pounds right and left UE,     Attempted side plank on wall k- immediate increased  spinal pain     Cervical isometrics   Transverse plane isometrics in hooklying - good  HEP forward step with bilat same side reach, forward step and return    Hip hinge - manual contact to assist with hip drive HEP addition one arm press horizontal abduction red band    Right and left UE nerve glide with cervical lordosis supported - unable to rotate or SB cervical spine secondary to increased UE symptoms. Deep neck flexors - bilat UE symptoms  Medicordz resisted side step, ambulation         Postural training   One arm press superband ambulate > 20 feet x 2 right and left UE           Side step with superband            Charges:   Therapeutic exercise 2, neuro re-ed     Total Timed Treatment:  45 min    Total Treatment Time: 45 min

## 2023-11-09 ENCOUNTER — APPOINTMENT (OUTPATIENT)
Dept: PHYSICAL THERAPY | Age: 53
End: 2023-11-09
Attending: ORTHOPAEDIC SURGERY
Payer: COMMERCIAL

## 2023-11-13 ENCOUNTER — OFFICE VISIT (OUTPATIENT)
Dept: PHYSICAL THERAPY | Age: 53
End: 2023-11-13
Attending: ORTHOPAEDIC SURGERY
Payer: COMMERCIAL

## 2023-11-13 PROCEDURE — 97140 MANUAL THERAPY 1/> REGIONS: CPT | Performed by: PHYSICAL THERAPIST

## 2023-11-13 PROCEDURE — 97110 THERAPEUTIC EXERCISES: CPT | Performed by: PHYSICAL THERAPIST

## 2023-11-13 PROCEDURE — 97112 NEUROMUSCULAR REEDUCATION: CPT | Performed by: PHYSICAL THERAPIST

## 2023-11-13 NOTE — PROGRESS NOTES
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, sacral and sacrococcygeal region (M91.087)  History of spinal fracture (Z87.81)  Right hip pain (M25.551)  Shoulder pain, bilateral (M25.511,M25.512)           Acute pain of both shoulders (M25.511,M25.512)  Disorder of bursae and tendons in shoulder region (M71.9,M67.919)  Authorized # of Visits:  12  Fall Risk: standard         Precautions: n/a            Subjective:      Burning in both arms and right side LBP   Pain 4/10  Objective:  Treadmill - ambulation - increased right side lumbar pain, pain decreased when not utilizing UE for support   Right hip figure four mobility slight decrease with pain end ROM. Pain eliminated with support at cervical lordosis   4 pounds - max overhead lift. Decreased pain with split versus parallel stance    Cable column pulldown - max load - 36 pounds   Cervical rotation right - increased pain, decreased ROM - support at lordosis decreases pain       Assessment:   Patient continues to demonstrate improved cervical ROM and decreased spinal pain with support at cervical lordosis.  UE ROM symptoms are also decreased with support at lordosis           Plan  PT 2x/wk progressive exercise    Date: 10/11/2023   Tx#: 12/24 Date: [id number]  Tx#:  13/24   Date: 10/23/2023    Date: 10/25/2023    Date: 10/30/2023   16/24 Date: 11/1/2023 17/24 Date: 11/6/2023 18/24 Date: 11/8/2023 19/24  Date: 11/13/2023 20/24   TherEx TherEx TherEx  TherEx TherEx TherEx TherEx TherEx TherEx 15 min   Elliptical 8 minutes ______________ Cervical isometrics all directions - good  Elliptical 8 minutes  Same side thoracic mobility 10 reps each split stance  Elliptical 8 minutes  Elliptical 8 minutes  Treadmill 6 minutes  Treadmill 7 minutes - reduced pain when UE support is not utilized    Braiding, closed chain hip IR, high knees  Supine lower trunk rotation bilaterally x 15 reps    Supine single knee to chest stretch x 20     S/L trunk rotation x 15 reps each direction SL thoracic mobility 10 reps each. Right and left scap passive mobility, supine cervical rotation right and left with support at lordosis, no support at lordosis = pain  TRX retraction 10 reps x 2  Rhythmic stabilization contact pelvis and shoulders 10 reps x 2,  Transverse plane isometrics through UE split stance  Supine to right and left SL 10 reps each - no pain during transition. Left SL symptoms without cervical lordosis support  Closed chain hip IR UE , high knees, anterior/posterior chain UE   8-30 reps each  Ant/posterior pelvic tilt - painful thoracic spine. With support at cervical lordosis - no pain     LE PROM - no pain with support at cervical lordosis   Right figure four slight decrease mobility 4 reps x 20 seconds right and left stretch  Mediordz resisted side step and ambulation - good tolerance   Side step with isometric hold superband  Supine 90/90 neural mobilization x 20 each LE    Supine posterior and anterior pelvic rotation with instruction in \"neutral\"l spine defined as position of comfort relative to the lumbar lordosis Prone press up - reduces cervical and lumbar pain  TRX squats 10 reps x 2  Prone one elbows alternate reaches 10 reps each  SL thoracic rotation 10 reps each     LTR with cervical rotation opposite support at cervical lordosis     Right and left LE knee to chest, hamstring mobility with DF PROM  TRX squats 10 reps x 2  Supine attempt right UE PROM - increased right UE symptoms. Support at lordosis minimal improvement as symptoms had increased significantly. Manual traction in upright - reduced symptoms   No pain with UE ROM including combined elbow extension with wrist flex/exten Cervical rotation right and left - right decreased ROM - ROM improved/pain decreased with support at lordosis     One arm forward press ambulation > 30 feet x 2 right and left UE AROM cervical rotation with supported sitting and neutral spine with scapular setting.  Neuro re-ed 30 minutes  Quadruped UE reaches, LE reaches, UE/LE reaches  Bilat pulldown 48 pounds > 20   Manual therapy 30 minutes:  Right SB with support cervical lordosis. UE AROM with manual support at lordosis. Cervical flexion with manual support, manual pressure during spinal extension  Neuro re-ed   SL rhythmic stabilization contacts pelvis/shoulders 10 reps each right and left     Postural training in standing   Rhythmic stabilization contact points pelvis/shoulders 10 reps each Supine - initial positioning - right side lumbar pain, pain. Support placed at cervical lordosis eliminated pain    PROM right and left LE - no pain, removed cervical support - no pain. Cervical rotation decreased right and painful  - pain decreased with inferior glide right first rib. Neuro re-ed   Split - 15 min  split stance/parallel stance 4 pound medicine ball overhead lift - reduced pain with split stance     Cable pulldown 36 puonds bilat hold parallel and split stance     One arm pull 36 pounds     Max load = 46 pounds. Included tactile and verbal cues for alignment with overhead training and cable column work    2 pound alternate overhead press split stance 10 reps x 2 each  Manual Therapy x 15 minutes    -Central, unilateral, transverse mobilizastion upper and mid-t-spine within prone and side lying, followed by S/L trunk rotation.  Bent knee fallouts, left leg load - right UE symptoms - bracing prior to motion - no pain  4 pound medicine ball overhead lift 10 reps    Cervical isometrics ant/post/lat - good strength - no pain  Neuro re-ed 10 minutes     Cervical isometrics, rhythmic stabilization contact points pelvis/shoulders, transverse plane isometrics through UE parallel and split stance,     10 pound suitcase carry thoracic pain - no pain with farmer's carry 10 pounds right and left UE,     Attempted side plank on wall k- immediate increased  spinal pain   Manual therapy 10 min   Figure four stretch right  and left LE with cervical support at lordosis,  manual hold at pelvis        Cervical isometrics   Transverse plane isometrics in hooklying - good  HEP forward step with bilat same side reach, forward step and return    Hip hinge - manual contact to assist with hip drive HEP addition one arm press horizontal abduction red band     Right and left UE nerve glide with cervical lordosis supported - unable to rotate or SB cervical spine secondary to increased UE symptoms. Deep neck flexors - bilat UE symptoms  Medicordz resisted side step, ambulation          Postural training   One arm press superband ambulate > 20 feet x 2 right and left UE            Side step with superband             Charges:   Therapeutic exercise, manual therapy, neuro re-ed     Total Timed Treatment:  40 min    Total Treatment Time: 40 min

## 2023-11-15 ENCOUNTER — APPOINTMENT (OUTPATIENT)
Dept: PHYSICAL THERAPY | Age: 53
End: 2023-11-15
Attending: ORTHOPAEDIC SURGERY
Payer: COMMERCIAL

## 2023-12-07 ENCOUNTER — OFFICE VISIT (OUTPATIENT)
Dept: PHYSICAL THERAPY | Age: 53
End: 2023-12-07
Attending: ORTHOPAEDIC SURGERY
Payer: COMMERCIAL

## 2023-12-07 ENCOUNTER — TELEPHONE (OUTPATIENT)
Dept: PHYSICAL THERAPY | Facility: HOSPITAL | Age: 53
End: 2023-12-07

## 2023-12-08 NOTE — PROGRESS NOTES
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 Subjective:    Patient reports she continues to have variable levels of pain from day to day. She was walking the other day when she had severe right side LBP. She was able to decrease her pain with getting in the crawling position and doing he rocking exercise. She is experiencing urinary urgency that seems to be getting a little bit worse. Burning in both arms and right side LBP   Pain 5/10  Objective:  Cervical posture: slight left side bend. Correction of side bend provokes bilat UE symptoms and right lumbar pain. Manual cervical distraction in standing - decreased symptoms. Cervical flexion: biltat UE symptoms, right lumbar pain. Cervical extension reduction in symptoms. Active cervical rotation decreased right and left - symptoms provoked. Passive rotation improves ROM when performed in standing. In supine - rotation without support at cervical lordosis provokes symptoms. Bilateral UE/LEstrength/ROM WNL. Right Apley's IR - mild pain. Cervical support eliminates pain    Position changes sit to and from supine and rolling - bilat UE symptoms/cervical pain. Therapist assist with head hold during position changes essentially eliminates pain. There is slight deficit right figure four mobility - support of cervical lordosis improves right hip mobility and reduces pain         Assessment:   Patient returned to PT for  brief re-eval. She has not attended therapy since 11/13/2023 secondary to respiratory illness.  Although the patient has some right shoulder pain as well as lumbar complaints, pain appears to be generated from the cervical spine. The most significant problem has been the ongoing difficulty with position changes. sit to and from supine and with rolling. Since the onset of therapy, there has been no improvement in ability to perform this functional movement. There is clear reduction in pain at lumbar levels, right shoulder  as well as cervical spine when manual support is provided during position changes. She does obtain some relief with manual cervical traction, only in upright position. In supine she obtains best relief with support of cervical lordosis. Discussed progress with patient, she would like to continue with therapy. Secondary to insurance change, she requests resuming in January. She is scheduled for follow up with physician in January. Recommend she discuss the urinary changes with physician. In agreement with continuing therapy for additional 4 weeks to improve  on position changes and work to centralize symptoms. Plan:   As above     Patient/Family/Caregiver was advised of these findings, precautions, and treatment options and has agreed to actively participate in planning and for this course of care. Thank you for your referral. If you have any questions, please contact me at Dept: [phone]. Sincerely,  Electronically signed by therapist: Klever Lynne PT     Physician's certification required:  Yes  Please co-sign or sign and return this letter via fax as soon as possible to [phone]. I certify the need for these services furnished under this plan of treatment and while under my care.     X___________________________________________________ Date____________________    Certification From: 76/5/0906  To:3/7/2024      Date: 10/11/2023   Tx#: 12/24 Date: 10/237422  Tx#:  13/24   Date: 10/23/2023    Date: 10/25/2023    Date: 10/30/2023   16/24 Date: 11/1/2023 17/24 Date: 11/6/2023 18/24 Date: 11/8/2023 19/24  Date: 11/13/2023 20/24   TherEx TherEx TherEx  TherEx TherEx TherEx TherEx TherEx TherEx 15 min   Elliptical 8 minutes ______________ Cervical isometrics all directions - good  Elliptical 8 minutes  Same side thoracic mobility 10 reps each split stance  Elliptical 8 minutes  Elliptical 8 minutes  Treadmill 6 minutes  Treadmill 7 minutes - reduced pain when UE support is not utilized    Braiding, closed chain hip IR, high knees  Supine lower trunk rotation bilaterally x 15 reps    Supine single knee to chest stretch x 20     S/L trunk rotation x 15 reps each direction SL thoracic mobility 10 reps each. Right and left scap passive mobility, supine cervical rotation right and left with support at lordosis, no support at lordosis = pain  TRX retraction 10 reps x 2  Rhythmic stabilization contact pelvis and shoulders 10 reps x 2,  Transverse plane isometrics through UE split stance  Supine to right and left SL 10 reps each - no pain during transition. Left SL symptoms without cervical lordosis support  Closed chain hip IR UE , high knees, anterior/posterior chain UE   8-55 reps each  Ant/posterior pelvic tilt - painful thoracic spine.  With support at cervical lordosis - no pain     LE PROM - no pain with support at cervical lordosis   Right figure four slight decrease mobility 4 reps x 20 seconds right and left stretch  Mediordz resisted side step and ambulation - good tolerance   Side step with isometric hold superband  Supine 90/90 neural mobilization x 20 each LE    Supine posterior and anterior pelvic rotation with instruction in \"neutral\"l spine defined as position of comfort relative to the lumbar lordosis Prone press up - reduces cervical and lumbar pain  TRX squats 10 reps x 2  Prone one elbows alternate reaches 10 reps each  SL thoracic rotation 10 reps each     LTR with cervical rotation opposite support at cervical lordosis     Right and left LE knee to chest, hamstring mobility with DF PROM  TRX squats 10 reps x 2  Supine attempt right UE PROM - increased right UE symptoms. Support at lordosis minimal improvement as symptoms had increased significantly. Manual traction in upright - reduced symptoms   No pain with UE ROM including combined elbow extension with wrist flex/exten Cervical rotation right and left - right decreased ROM - ROM improved/pain decreased with support at lordosis     One arm forward press ambulation > 30 feet x 2 right and left UE AROM cervical rotation with supported sitting and neutral spine with scapular setting. Neuro re-ed 30 minutes  Quadruped UE reaches, LE reaches, UE/LE reaches  Bilat pulldown 48 pounds > 20   Manual therapy 30 minutes:  Right SB with support cervical lordosis. UE AROM with manual support at lordosis. Cervical flexion with manual support, manual pressure during spinal extension  Neuro re-ed   SL rhythmic stabilization contacts pelvis/shoulders 10 reps each right and left     Postural training in standing   Rhythmic stabilization contact points pelvis/shoulders 10 reps each Supine - initial positioning - right side lumbar pain, pain. Support placed at cervical lordosis eliminated pain    PROM right and left LE - no pain, removed cervical support - no pain. Cervical rotation decreased right and painful  - pain decreased with inferior glide right first rib. Neuro re-ed   Split - 15 min  split stance/parallel stance 4 pound medicine ball overhead lift - reduced pain with split stance     Cable pulldown 36 puonds bilat hold parallel and split stance     One arm pull 36 pounds     Max load = 46 pounds. Included tactile and verbal cues for alignment with overhead training and cable column work    2 pound alternate overhead press split stance 10 reps x 2 each  Manual Therapy x 15 minutes    -Central, unilateral, transverse mobilizastion upper and mid-t-spine within prone and side lying, followed by S/L trunk rotation.  Bent knee fallouts, left leg load - right UE symptoms - bracing prior to motion - no pain  4 pound medicine ball overhead lift 10 reps    Cervical isometrics ant/post/lat - good strength - no pain  Neuro re-ed 10 minutes     Cervical isometrics, rhythmic stabilization contact points pelvis/shoulders, transverse plane isometrics through UE parallel and split stance,     10 pound suitcase carry thoracic pain - no pain with farmer's carry 10 pounds right and left UE,     Attempted side plank on wall k- immediate increased  spinal pain   Manual therapy 10 min   Figure four stretch right  and left LE with cervical support at lordosis,  manual hold at pelvis        Cervical isometrics   Transverse plane isometrics in hooklying - good  HEP forward step with bilat same side reach, forward step and return    Hip hinge - manual contact to assist with hip drive HEP addition one arm press horizontal abduction red band     Right and left UE nerve glide with cervical lordosis supported - unable to rotate or SB cervical spine secondary to increased UE symptoms.    Deep neck flexors - bilat UE symptoms  Medicordz resisted side step, ambulation          Postural training   One arm press superband ambulate > 20 feet x 2 right and left UE            Side step with superband             Charges:  No charge < 10 minute visit

## 2024-01-09 ENCOUNTER — APPOINTMENT (OUTPATIENT)
Dept: PHYSICAL THERAPY | Age: 54
End: 2024-01-09
Attending: ORTHOPAEDIC SURGERY
Payer: COMMERCIAL

## 2024-01-16 ENCOUNTER — APPOINTMENT (OUTPATIENT)
Dept: PHYSICAL THERAPY | Age: 54
End: 2024-01-16
Attending: ORTHOPAEDIC SURGERY
Payer: COMMERCIAL

## 2024-01-18 ENCOUNTER — OFFICE VISIT (OUTPATIENT)
Dept: PHYSICAL THERAPY | Age: 54
End: 2024-01-18
Attending: ORTHOPAEDIC SURGERY
Payer: COMMERCIAL

## 2024-01-18 ENCOUNTER — ORDER TRANSCRIPTION (OUTPATIENT)
Dept: PHYSICAL THERAPY | Age: 54
End: 2024-01-18

## 2024-01-18 DIAGNOSIS — M54.9 DORSALGIA: ICD-10-CM

## 2024-01-18 DIAGNOSIS — S22.000D: Primary | ICD-10-CM

## 2024-01-18 PROCEDURE — 97164 PT RE-EVAL EST PLAN CARE: CPT | Performed by: PHYSICAL THERAPIST

## 2024-01-18 PROCEDURE — 97140 MANUAL THERAPY 1/> REGIONS: CPT | Performed by: PHYSICAL THERAPIST

## 2024-01-18 NOTE — PROGRESS NOTES
Dx: spondylosis without myelopathy or radiculopathy, sacral and sacrococcygeal region (M47.818)  History of spinal fracture (Z87.81)  Right hip pain (M25.551)  Shoulder pain, bilateral (M25.511,M25.512)           Acute pain of both shoulders (M25.511,M25.512)  Disorder of bursae and tendons in shoulder region (M71.9,M67.919)  Authorized # of Visits:  12  Fall Risk: standard         Precautions: n/a           Progress Summary  Pt has attended 22 visits in Physical Therapy.    Subjective:    Pain neck, upper back and right lower back. There is burning in both forearms. Left arm more burning.   Pain is increased when bending to put on shoes   Pain 4/10  Objective:  Gait: No deviation. High knees gait - lumbar pain right > left   Spinal ROM: slight right lateral shift. Decreased active cervical rotation right and left 50%, SB decreased 50%, extension WNL, flexion WNL. Bilat UE forearm \"burning \" complaints with spinal forward bending/simulation of donning shoes forward bending lumbar spine WNL. PROM cervical spine WNL. Manual traction/lower cervical PAS reduced bilat UE complaints. Thoracic PAS - mid cervical spine - painful No acute soft tissue tenderness   Position changes: No increase in UE symptoms. Patient demonstrated rolling, LTR, sit to and from supine without provoking symptoms.   Quadruped: Rock with hip hinge - no symptoms. Rocking with cervical flexion - bilat UE symptoms   Strength: Bilateral UE/LE gross strength 5/5. Lumbar pain with active SLR right and left LE   ROM: mild deficit right figure four mobility - lumbar pain, mild lateal hip pain . Squats with no increase in lumbar pain       Assessment:   Patient returns to therapy for first visit since 12/8/2023. Primary complaints of bilateral forearm \"burning\" remain. Lumbar pain may be associated with C/T spine. She demonstrated position changes of sit to and from supine with greater ease than at previous visits. In addition, there was no  guarding/increase in pain with rolling supine to and from SL. Forward bending provoked increased bilat UE symptoms. As at previous visits, symptoms were reduced with standing manual traction. For the first time since onset of therapy, there was reduction in symptoms with manual traction in supine. Physician has prescribed a  home traction unit if traction is beneficial. Will proceed with at least 3 additional sessions performing manual traction  to ensure that traction is helpful.     Revised Goals:  Patient will be able to forward bend/put on shoes without provoking increased UE symptoms   Patient will be able to lift/carry minimum of 20 pounds without provoking pain  Patient will be able to perform right and left hip mobility without provoking pain           Plan:    Continue PT 2x/wk manual therapy, neuro re-ed, therapeutic exercise     Patient/Family/Caregiver was advised of these findings, precautions, and treatment options and has agreed to actively participate in planning and for this course of care.    Thank you for your referral. If you have any questions, please contact me at Dept: [phone].    Sincerely,  Electronically signed by therapist: Dara Godfrey PT     Physician's certification required:  Yes  Please co-sign or sign and return this letter via fax as soon as possible to [phone].   I certify the need for these services furnished under this plan of treatment and while under my care.    X___________________________________________________ Date____________________    Certification From: 12/8/2023  To:3/7/2024      Date: 10/25/2023    Date: 10/30/2023   16/24 Date: 11/1/2023 17/24 Date: 11/6/2023 18/24 Date: 11/8/2023 19/24  Date: 11/13/2023 20/24 Date: 1/18/2024   New prescription 1/12   TherEx TherEx TherEx TherEx TherEx TherEx 15 min Manual therapy 10 min   Elliptical 8 minutes  Same side thoracic mobility 10 reps each split stance  Elliptical 8 minutes  Elliptical 8 minutes   Treadmill 6 minutes  Treadmill 7 minutes - reduced pain when UE support is not utilized  Manual traction supine/standing, lower cervical PAS   TRX retraction 10 reps x 2  Rhythmic stabilization contact pelvis and shoulders 10 reps x 2,  Transverse plane isometrics through UE split stance  Supine to right and left SL 10 reps each - no pain during transition. Left SL symptoms without cervical lordosis support  Closed chain hip IR UE , high knees, anterior/posterior chain UE   5-10 reps each  Ant/posterior pelvic tilt - painful thoracic spine. With support at cervical lordosis - no pain     LE PROM - no pain with support at cervical lordosis   Right figure four slight decrease mobility 4 reps x 20 seconds right and left stretch  Mediordz resisted side step and ambulation - good tolerance    TRX squats 10 reps x 2  Prone one elbows alternate reaches 10 reps each  SL thoracic rotation 10 reps each     LTR with cervical rotation opposite support at cervical lordosis     Right and left LE knee to chest, hamstring mobility with DF PROM  TRX squats 10 reps x 2  Supine attempt right UE PROM - increased right UE symptoms. Support at lordosis minimal improvement as symptoms had increased significantly. Manual traction in upright - reduced symptoms   No pain with UE ROM including combined elbow extension with wrist flex/exten Cervical rotation right and left - right decreased ROM - ROM improved/pain decreased with support at lordosis      Bilat pulldown 48 pounds > 20   Manual therapy 30 minutes:  Right SB with support cervical lordosis. UE AROM with manual support at lordosis. Cervical flexion with manual support, manual pressure during spinal extension  Neuro re-ed   SL rhythmic stabilization contacts pelvis/shoulders 10 reps each right and left     Postural training in standing   Rhythmic stabilization contact points pelvis/shoulders 10 reps each Supine - initial positioning - right side lumbar pain, pain. Support  placed at cervical lordosis eliminated pain    PROM right and left LE - no pain, removed cervical support - no pain.     Cervical rotation decreased right and painful  - pain decreased with inferior glide right first rib.   Neuro re-ed   Split - 15 min  split stance/parallel stance 4 pound medicine ball overhead lift - reduced pain with split stance     Cable pulldown 36 puonds bilat hold parallel and split stance     One arm pull 36 pounds     Max load = 46 pounds. Included tactile and verbal cues for alignment with overhead training and cable column work     4 pound medicine ball overhead lift 10 reps    Cervical isometrics ant/post/lat - good strength - no pain  Neuro re-ed 10 minutes     Cervical isometrics, rhythmic stabilization contact points pelvis/shoulders, transverse plane isometrics through UE parallel and split stance,     10 pound suitcase carry thoracic pain - no pain with farmer's carry 10 pounds right and left UE,     Attempted side plank on wall k- immediate increased  spinal pain   Manual therapy 10 min   Figure four stretch right  and left LE with cervical support at lordosis,  manual hold at pelvis         HEP forward step with bilat same side reach, forward step and return    Hip hinge - manual contact to assist with hip drive HEP addition one arm press horizontal abduction red band      Medicordz resisted side step, ambulation         One arm press superband ambulate > 20 feet x 2 right and left UE          Side step with superband              Charges:  Manual therapy   Re-eval

## 2024-01-22 ENCOUNTER — OFFICE VISIT (OUTPATIENT)
Dept: PHYSICAL THERAPY | Age: 54
End: 2024-01-22
Attending: ORTHOPAEDIC SURGERY
Payer: COMMERCIAL

## 2024-01-22 PROCEDURE — 97140 MANUAL THERAPY 1/> REGIONS: CPT | Performed by: PHYSICAL THERAPIST

## 2024-01-22 PROCEDURE — 97110 THERAPEUTIC EXERCISES: CPT | Performed by: PHYSICAL THERAPIST

## 2024-01-23 NOTE — PROGRESS NOTES
Dx:  Compression fracture of thoracic vertebra with routine healing, subsequent encounter (S22.000D)  Dorsalgia (M54.9)           Authorized # of Visits:  12  Fall Risk: standard         Precautions: n/a             Subjective:   Burning both arms, right side lumbar pain   Current Pain Ratin/10  Objective:   Right lumbar pain provoked with ant/post pelvic tilt, blocking cervical spine - eliminates pain   Manual cervical traction decreases lumbar pain bilat UE symptoms  Position changes sit to and from supine and rolling - painful/guarded     Assessment:   Lumbar pain increased versus initial evaluation. Lumbar pain appears related to cervical spine as there is reduction in pain with support at cervical spine.  Good reduction in bilat UE symptoms with manual traction.     Plan:   PT 2x/wk progressive exercise, manual therapy, neuro re-ed     Date: 2024  Tx#:  Date:   Tx#: 3/ Date:   Tx#: 4/ Date:   Tx#: 5/ Date:   Tx#: 6/ Date:   Tx#: 7/ Date:   Tx#: 8/   TherEx 30 min TherEx TherEx TherEx TherEx TherEx TherEx   Elliptical 6 minutes          TRX squats - pain with return to urpight, less pain with squat - no UE support          Thoracic mobility same side bilat reach - lumbar pain in split stance          Isometric hip abd/add in hooklying posterior ant/post pelvic tilt no pain with cervical spine supported - lumbar pain without          Manual therapy 15 min  Cervical manual traction in supine, cervical PROM,                                                  Charges: TherEx 2, manual therapy        Total Timed Treatment: 45 min  Total Treatment Time: 45 min

## 2024-01-24 ENCOUNTER — OFFICE VISIT (OUTPATIENT)
Dept: PHYSICAL THERAPY | Age: 54
End: 2024-01-24
Attending: ORTHOPAEDIC SURGERY
Payer: COMMERCIAL

## 2024-01-24 PROCEDURE — 97110 THERAPEUTIC EXERCISES: CPT | Performed by: PHYSICAL THERAPIST

## 2024-01-24 PROCEDURE — 97140 MANUAL THERAPY 1/> REGIONS: CPT | Performed by: PHYSICAL THERAPIST

## 2024-01-25 NOTE — PROGRESS NOTES
Dx:  Compression fracture of thoracic vertebra with routine healing, subsequent encounter (S22.000D)  Dorsalgia (M54.9)           Authorized # of Visits:  12  Fall Risk: standard         Precautions: n/a             Subjective:   Burning in arms is better. Right side LBP today.   Current Pain Ratin/10  Objective:   Right side lumbar pain provoked with ant/post tilt, knees to chest with SB. No pain with passive mobility   Prone hip extension - initial reps decreased right hip extension. Cues for glut set first improved extension, decreased pain   Prone position following hip extension - increased UE symptoms. Positioned patient in standing, manual traction cervical spine relived symptoms.      Assessment:   Pain remains variable from day to day with no direct correlation with activity. As was previously noted, symptoms appear be associated with cervical spine including when lumbar pain is present. Lumbar and UE symptoms respond to cervical traction. Home unit has been prescribed. Will trial mechanical traction prior to issuing home unit.     Plan:   PT 2x/wk progressive exercise, manual therapy, neuro re-ed     Date: 2024  Tx#: 2 Date: 2024   Tx#: 3 Date:   Tx#: 4/ Date:   Tx#: 5/ Date:   Tx#: 6/ Date:   Tx#: 7/ Date:   Tx#: 8/   TherEx 30 min TherEx 30 min TherEx TherEx TherEx TherEx TherEx   Elliptical 6 minutes  Elliptical 10 min        TRX squats - pain with return to urpight, less pain with squat - no UE support  TRX bilateral shoulder flexion 10 reps        Thoracic mobility same side bilat reach - lumbar pain in split stance  SL thoracic mobility - initial reps -right lumbar pain > 10 reps each         Isometric hip abd/add in hooklying posterior ant/post pelvic tilt no pain with cervical spine supported - lumbar pain without  Post tilt - lumbar pain, knees to chest with SB lumbar pain, passive knees to chest - no pain         Manual therapy 15 min  Cervical manual traction in supine,  cervical PROM,  Prone hip extension 10 reps x 2  following prone position - increased UE symptoms manual traction decreased UE symptoms          Manual therapy 15 min  Cervical manual traction in supine, lower cervical PAS - good tolerance                                        Charges: TherEx 2, manual therapy        Total Timed Treatment: 45 min  Total Treatment Time: 45 min

## 2024-01-30 ENCOUNTER — OFFICE VISIT (OUTPATIENT)
Dept: PHYSICAL THERAPY | Age: 54
End: 2024-01-30
Attending: ORTHOPAEDIC SURGERY
Payer: COMMERCIAL

## 2024-01-30 PROCEDURE — 97012 MECHANICAL TRACTION THERAPY: CPT | Performed by: PHYSICAL THERAPIST

## 2024-01-30 PROCEDURE — 97110 THERAPEUTIC EXERCISES: CPT | Performed by: PHYSICAL THERAPIST

## 2024-01-31 NOTE — PROGRESS NOTES
Dx:  Compression fracture of thoracic vertebra with routine healing, subsequent encounter (S22.000D)  Dorsalgia (M54.9)           Authorized # of Visits:  12  Fall Risk: standard         Precautions: n/a             Subjective:   Burning in both arms   Current Pain Ratin/10  Objective:   Treatment as noted. Initiated ICT - patient instructed in precautions  of no increase in UE symptoms, no headaches   Bilat UE symptoms decreased with manual traction when symptoms were provoked with cervical flexion      Assessment:   Patient responded well to addition of ICT. Primary problem is bilat UE symptoms with cervical flexion and with position changes     Plan:   PT 2x/wk progressive exercise, manual therapy, neuro re-ed, ICT    Date: 2024  Tx#: 2 Date: 2024   Tx#: 3 Date: 2024   Tx#:  Date:   Tx#: 5/ Date:   Tx#: 6/ Date:   Tx#: 7/ Date:   Tx#: 8/   TherEx 30 min TherEx 30 min TherEx 30 min TherEx TherEx TherEx TherEx   Elliptical 6 minutes  Elliptical 10 min Elliptical 7 minutes       TRX squats - pain with return to urpight, less pain with squat - no UE support  TRX bilateral shoulder flexion 10 reps Forward step bilat overhead reach 10 reps each       Thoracic mobility same side bilat reach - lumbar pain in split stance  SL thoracic mobility - initial reps -right lumbar pain > 10 reps each  Posterior step with spinal flexion - bilat UE symptoms 10 reps each       Isometric hip abd/add in hooklying posterior ant/post pelvic tilt no pain with cervical spine supported - lumbar pain without  Post tilt - lumbar pain, knees to chest with SB lumbar pain, passive knees to chest - no pain  Split stance same side bilat reach 10 reps each        Manual therapy 15 min  Cervical manual traction in supine, cervical PROM,  Prone hip extension 10 reps x 2  following prone position - increased UE symptoms manual traction decreased UE symptoms  Hip/hamstring mobility on 8 inch step, hamstring with ankle pump  - no forward bending  - good           Manual therapy 15 min  Cervical manual traction in supine, lower cervical PAS - good tolerance  Transverse plane isometrics parallel and split stance 10 reps each          Rhythmic stab contact points shoulders, pelvis, shoulders/pelvis, cervical isometrics          Figure four stretch supine 20 seconds x 3 each right hip tighter than left          ICT - 10 minutes 12 pounds 20 second hold, 10 second relax                     Charges: TherEx 2, traction       Total Timed Treatment: 40 min  Total Treatment Time: 45 min

## 2024-02-01 ENCOUNTER — OFFICE VISIT (OUTPATIENT)
Dept: PHYSICAL THERAPY | Age: 54
End: 2024-02-01
Attending: ORTHOPAEDIC SURGERY
Payer: COMMERCIAL

## 2024-02-01 PROCEDURE — 97012 MECHANICAL TRACTION THERAPY: CPT | Performed by: PHYSICAL THERAPIST

## 2024-02-01 PROCEDURE — 97110 THERAPEUTIC EXERCISES: CPT | Performed by: PHYSICAL THERAPIST

## 2024-02-01 NOTE — PROGRESS NOTES
Dx:  Compression fracture of thoracic vertebra with routine healing, subsequent encounter (S22.000D)  Dorsalgia (M54.9)           Authorized # of Visits:  12  Fall Risk: standard         Precautions: n/a             Subjective:   Burning in both arms. Right side back pain. Patient states she went for a walk today. Pain increased during her walk   Current Pain Ratin/10  Objective:   Right lumbar pain provoked with forward bending. Hip hinge - no symptoms           Assessment:   Patient is responding to cervical traction. Lumbar pain as well a UE symptoms are provoked with cervical flexion. Symptoms are variable from day to day.     Plan:   PT 2x/wk progressive exercise, manual therapy, neuro re-ed, ICT    Date: 2024  Tx#:  Date: 2024   Tx#: 3/12 Date: 2024   Tx#:  Date: 2024   Tx#:  Date:   Tx#: 6/ Date:   Tx#: 7/ Date:   Tx#: 8/   TherEx 30 min TherEx 30 min TherEx 30 min TherEx 30 min TherEx TherEx TherEx   Elliptical 6 minutes  Elliptical 10 min Elliptical 7 minutes Nustep L5 8 minutes      TRX squats - pain with return to urpight, less pain with squat - no UE support  TRX bilateral shoulder flexion 10 reps Forward step bilat overhead reach 10 reps each Frontal plane mobility bilat UE  on wall, transverse plane combined with frontal plane bilat UE        Thoracic mobility same side bilat reach - lumbar pain in split stance  SL thoracic mobility - initial reps -right lumbar pain > 10 reps each  Posterior step with spinal flexion - bilat UE symptoms 10 reps each Hip hinge - no symptoms       Isometric hip abd/add in hooklying posterior ant/post pelvic tilt no pain with cervical spine supported - lumbar pain without  Post tilt - lumbar pain, knees to chest with SB lumbar pain, passive knees to chest - no pain  Split stance same side bilat reach 10 reps each  Quadruped UE/LE reach - pain at times with right LE reach       Manual therapy 15 min  Cervical manual traction in  supine, cervical PROM,  Prone hip extension 10 reps x 2  following prone position - increased UE symptoms manual traction decreased UE symptoms  Hip/hamstring mobility on 8 inch step, hamstring with ankle pump - no forward bending  - good    Bent knee fallouts 20 reps each       Manual therapy 15 min  Cervical manual traction in supine, lower cervical PAS - good tolerance  Transverse plane isometrics parallel and split stance 10 reps each  LTR with cervical opposite - good         Rhythmic stab contact points shoulders, pelvis, shoulders/pelvis, cervical isometrics  ICT - 15 minutes 13 pounds 30 second hold, 10 second relax         Figure four stretch supine 20 seconds x 3 each right hip tighter than left          ICT - 10 minutes 12 pounds 20 second hold, 10 second relax                     Charges: TherEx 2, traction       Total Timed Treatment: 40 min  Total Treatment Time: 45 min

## 2024-02-06 ENCOUNTER — OFFICE VISIT (OUTPATIENT)
Dept: PHYSICAL THERAPY | Age: 54
End: 2024-02-06
Attending: ORTHOPAEDIC SURGERY
Payer: COMMERCIAL

## 2024-02-06 ENCOUNTER — TELEPHONE (OUTPATIENT)
Dept: PHYSICAL THERAPY | Age: 54
End: 2024-02-06

## 2024-02-06 PROCEDURE — 97110 THERAPEUTIC EXERCISES: CPT | Performed by: PHYSICAL THERAPIST

## 2024-02-06 PROCEDURE — 97012 MECHANICAL TRACTION THERAPY: CPT | Performed by: PHYSICAL THERAPIST

## 2024-02-07 NOTE — PROGRESS NOTES
Dx:  Compression fracture of thoracic vertebra with routine healing, subsequent encounter (S22.000D)  Dorsalgia (M54.9)           Authorized # of Visits:  12  Fall Risk: standard         Precautions: n/a             Subjective:   Patient states she went for an hour long walk. Right LBP developed during the walk. She had difficulty making it back home  Current Pain Ratin/10  Objective:   Right cervical spine lateral shift   Forward bending, cervical flexion provoke right side lumbar pain   Position changes sit to and from supine provoke right lumbar pain   Cervical compression - right lumbar pian, cervical manual traction decreased pain - position change back to sitting following traction provoked pain        Assessment:   Patient presented with clear increase in her pain. Right lumbar pain is influenced by cervical position. Extension decreases pain. Forward bending of cervical spine and/or spinal flexion provokes symptoms. There is good reduction in pain with traction. Reduction is not maintained.  Patient is to follow up with physician next week.     Plan:   Re-eval next visit.     Date: 2024  Tx#:  Date: 2024   Tx#: 3/12 Date: 2024   Tx#:  Date: 2024   Tx#:  Date: 2024   Tx#:  Date:   Tx#:  Date:   Tx#: 8/   TherEx 30 min TherEx 30 min TherEx 30 min TherEx 30 min TherEx 30 min TherEx TherEx   Elliptical 6 minutes  Elliptical 10 min Elliptical 7 minutes Nustep L5 8 minutes 2 minutes on treadmill - right lumbar pain, pain eliminated with manual cervical traction during gait      TRX squats - pain with return to urpight, less pain with squat - no UE support  TRX bilateral shoulder flexion 10 reps Forward step bilat overhead reach 10 reps each Frontal plane mobility bilat UE  on wall, transverse plane combined with frontal plane bilat UE   Position change to supine - pain right lumbar spine, pain eliminated with cervical manual traction or support at lordosis       Thoracic mobility same side bilat reach - lumbar pain in split stance  SL thoracic mobility - initial reps -right lumbar pain > 10 reps each  Posterior step with spinal flexion - bilat UE symptoms 10 reps each Hip hinge - no symptoms  Quadruped rock with hip hinge - no pain, quadruped rocking with flexion - right side lumbar pain      Isometric hip abd/add in hooklying posterior ant/post pelvic tilt no pain with cervical spine supported - lumbar pain without  Post tilt - lumbar pain, knees to chest with SB lumbar pain, passive knees to chest - no pain  Split stance same side bilat reach 10 reps each  Quadruped UE/LE reach - pain at times with right LE reach  Cervical manual traction, PROM - no pain      Manual therapy 15 min  Cervical manual traction in supine, cervical PROM,  Prone hip extension 10 reps x 2  following prone position - increased UE symptoms manual traction decreased UE symptoms  Hip/hamstring mobility on 8 inch step, hamstring with ankle pump - no forward bending  - good    Bent knee fallouts 20 reps each ICT - 15 minutes 13 pounds 30 second hold, 10 second relax       Manual therapy 15 min  Cervical manual traction in supine, lower cervical PAS - good tolerance  Transverse plane isometrics parallel and split stance 10 reps each  LTR with cervical opposite - good         Rhythmic stab contact points shoulders, pelvis, shoulders/pelvis, cervical isometrics  ICT - 15 minutes 13 pounds 30 second hold, 10 second relax         Figure four stretch supine 20 seconds x 3 each right hip tighter than left          ICT - 10 minutes 12 pounds 20 second hold, 10 second relax                     Charges: TherEx 2, traction       Total Timed Treatment: 45 min  Total Treatment Time: 45 min

## 2024-02-08 ENCOUNTER — OFFICE VISIT (OUTPATIENT)
Dept: PHYSICAL THERAPY | Age: 54
End: 2024-02-08
Attending: ORTHOPAEDIC SURGERY
Payer: COMMERCIAL

## 2024-02-08 PROCEDURE — 97110 THERAPEUTIC EXERCISES: CPT | Performed by: PHYSICAL THERAPIST

## 2024-02-08 NOTE — PROGRESS NOTES
Dx:  Compression fracture of thoracic vertebra with routine healing, subsequent encounter (S22.000D)  Dorsalgia (M54.9)           Authorized # of Visits:  12  Fall Risk: standard         Precautions: n/a            Progress Summary  Pt has attended 7 visits in Physical Therapy.    Subjective:   Pain varies from day to day. There can be burning in both arms and/or right side LBP. No LE complaints    Current Pain Ratin/10  Objective:   Gait: No deviation. Right lumbar pain with cervical flexion. Position changes supine to and from SL/sitting bilateral UE symptoms and at times right side lumbar pain.   Bilateral UE/LE strength: 5/5 with exception of right SLR - 3/5 when performed without cervical lordosis support. Patient is able to squat without provoking pain. Squat with cervical flexion or bending forward to tie shoes - provokes UE complaints, right side lumbar pain : right - 70 pounds, left 60 pounds. 10 pound suitcase carry provokes bilat UE symptoms   Posture slight right cervical spine lateral shift. Supine passive cervical ROM WNL. Active cervical ROM decreased slightly. Bilateral UE symptoms provoked with cervical flexion   Bilateral UE/LE ROM WNL         Assessment:   Patient continues to have transient bilateral UE symptoms and right side lumbar pain. Pain is reduced most effectively with support at cervical lordosis. There is no neural tension when supported. Although she has good overall isometric strength and mobility, she has been unable to progress with basic daily function.  UE symptoms and right lumbar pain are provoked nearly immediately with cervical flexion and during position changes. She has excellent reduction in symptoms with cervical extension and lordosis support following the provocation of the symptoms.  She tolerates cervical traction well but based on presentation, there is a more immediate response to pain with support at cervical lordosis and support combined with extension.   The improvements are not sustained as any movement towards flexion and at times ambulation reproduce her pain.    Patient has attempted to remain active and perform exercises over the course of an extended recovery. From a functional perspective, there should be improvement in position changes and her ability to perform basic ADLS.  Recommend further evaluation of cervical spine especially when positioned in flexion.     Patient will be able to forward bend/put on shoes without provoking increased UE symptoms - Not Met  Patient will be able to lift/carry minimum of 20 pounds without provoking pain - Not Met  Patient will be able to perform right and left hip mobility without provoking pain  - able to perform when cervical spine is stabilized with support at cervical lordosis         Plan: Continue PT. Patient to discuss POC at follow visit  with physician     Patient/Family/Caregiver was advised of these findings, precautions, and treatment options and has agreed to actively participate in planning and for this course of care.    Thank you for your referral. If you have any questions, please contact me at Dept: [phone].    Sincerely,  Electronically signed by therapist: Dara Godfrey PT     Physician's certification required:  Yes  Please co-sign or sign and return this letter via fax as soon as possible to [phone].   I certify the need for these services furnished under this plan of treatment and while under my care.    X___________________________________________________ Date____________________    Certification From: 2/8/2024  To:5/8/2024       Date: 2/1/2024   Tx#: 5/12 Date: 2/6/2024   Tx#: 6/12 Date: 2/8/2024   Tx#: 7/12 Date:   Tx#: 8/   TherEx 30 min TherEx 30 min TherEx TherEx   Nustep L5 8 minutes 2 minutes on treadmill - right lumbar pain, pain eliminated with manual cervical traction during gait  Treadmill 3 min 35 increased burning bilat UE, Elliptical 4 minutes    Frontal plane mobility bilat  UE  on wall, transverse plane combined with frontal plane bilat UE   Position change to supine - pain right lumbar spine, pain eliminated with cervical manual traction or support at lordosis  TRX squats > 20  reps     Squat with looking down - right side lumbar pain/burning in arms        Hip hinge - no symptoms  Quadruped rock with hip hinge - no pain, quadruped rocking with flexion - right side lumbar pain  TRX retraction < 20 reps prior to provoking symptoms in arms     Quadruped UE/LE reach - pain at times with right LE reach  Cervical manual traction, PROM - no pain  10 pound suitcase carry, right side lumbar pain     Bent knee fallouts 20 reps each ICT - 15 minutes 13 pounds 30 second hold, 10 second relax  Right and left LE PROM with cervical lordosis support - no pain, without support - UE complaints, right side lumbar pain    LTR with cervical opposite - good   Bilateral UE ROM - no symptoms with cervical lordosis support, UE symptoms without support      ICT - 15 minutes 13 pounds 30 second hold, 10 second relax                             Charges: TherEx 3     Total Timed Treatment: 45 min  Total Treatment Time: 45 min

## 2024-02-13 ENCOUNTER — OFFICE VISIT (OUTPATIENT)
Dept: PHYSICAL THERAPY | Age: 54
End: 2024-02-13
Attending: ORTHOPAEDIC SURGERY
Payer: COMMERCIAL

## 2024-02-13 PROCEDURE — 97110 THERAPEUTIC EXERCISES: CPT | Performed by: PHYSICAL THERAPIST

## 2024-02-13 NOTE — PROGRESS NOTES
Dx:  Compression fracture of thoracic vertebra with routine healing, subsequent encounter (S22.000D)  Dorsalgia (M54.9)           Authorized # of Visits:  12  Fall Risk: standard         Precautions: n/a               Subjective:   Pain right side of low back. No LE complaints    Current Pain Ratin/10  Objective:   Treadmill walking, increased lumbar/UE complaints ambulating without UE support. With UE support, there is reduction in pain   Position changes sit to and from supine - right side LBP and bilat UE complaints  Posterior pelvic tilt - UE complaints. Manual support at cervical lordosis eliminates pain          Assessment:   No changes to report. Exacerbation of symptoms continue to appear cervical related. Manual stabilization of cervical spine and cervical extension are best at reducing symptoms. Patient is following up with physician today. Report has been forwarded with recommendation for additional cervical evaluation     Patient will be able to forward bend/put on shoes without provoking increased UE symptoms - Not Met  Patient will be able to lift/carry minimum of 20 pounds without provoking pain - Not Met  Patient will be able to perform right and left hip mobility without provoking pain  - able to perform when cervical spine is stabilized with support at cervical lordosis         Plan: Continue PT, await physician evaluation             Date: 2024   Tx#:  Date: 2024   Tx#:  Date: 2024   Tx#:  Date: 2024   Tx#:    TherEx 30 min TherEx 30 min TherEx TherEx 45 minutes    Nustep L5 8 minutes 2 minutes on treadmill - right lumbar pain, pain eliminated with manual cervical traction during gait  Treadmill 3 min 35 increased burning bilat UE, Elliptical 4 minutes Treadmill 7 minutes UE support decreases UE symptoms    Frontal plane mobility bilat UE  on wall, transverse plane combined with frontal plane bilat UE   Position change to supine - pain right lumbar  spine, pain eliminated with cervical manual traction or support at lordosis  TRX squats > 20  reps     Squat with looking down - right side lumbar pain/burning in arms     Shuttle 6 bands squats 20 reps     Shuttle 5 bands single leg squats   Right = 50   Left = 50    Hip hinge - no symptoms  Quadruped rock with hip hinge - no pain, quadruped rocking with flexion - right side lumbar pain  TRX retraction < 20 reps prior to provoking symptoms in arms  Quad stretch 30 seconds x 3 each    Quadruped UE/LE reach - pain at times with right LE reach  Cervical manual traction, PROM - no pain  10 pound suitcase carry, right side lumbar pain  Bent knee fallouts - right side lumbar/hip pain - reduction in pain with support at cervical lordosis    Bent knee fallouts 20 reps each ICT - 15 minutes 13 pounds 30 second hold, 10 second relax  Right and left LE PROM with cervical lordosis support - no pain, without support - UE complaints, right side lumbar pain Knees to chest with SB - bilat UE symptoms, right LBP. Support at cervical lordosis reduces symptoms    LTR with cervical opposite - good   Bilateral UE ROM - no symptoms with cervical lordosis support, UE symptoms without support   Pelvic tilting - UE symptoms - repeated movement does not reduce symptoms. Support at cervical spine reduces    ICT - 15 minutes 13 pounds 30 second hold, 10 second relax    Right hip PROM - no pain       Cervical PROM in supine - no pain with support at lordosis                    Charges: TherEx 3     Total Timed Treatment: 50  min  Total Treatment Time: 50  min

## 2024-02-15 ENCOUNTER — OFFICE VISIT (OUTPATIENT)
Dept: PHYSICAL THERAPY | Age: 54
End: 2024-02-15
Attending: ORTHOPAEDIC SURGERY
Payer: COMMERCIAL

## 2024-02-15 PROCEDURE — 97110 THERAPEUTIC EXERCISES: CPT | Performed by: PHYSICAL THERAPIST

## 2024-02-15 NOTE — PROGRESS NOTES
Dx:  Compression fracture of thoracic vertebra with routine healing, subsequent encounter (S22.000D)  Dorsalgia (M54.9)           Authorized # of Visits:  12  Fall Risk: standard         Precautions: n/a               Subjective:   Patient states at follow up with doctor, he told her to go to pain clinic and her regular doctor. He does not need to see her anymore. She can continue PT  Current Pain Ratin/10  Objective:   Right side lumbar pain/increased UE c/o pain with cervical flexion and SL thoracic mobility. Relief of symptoms with support at cervical lordosis        Assessment:   At this time pain remains associated with cervical flexion, position changes. When in static posture, she has little to no pain. Problem is pain with movement. Discussed progress with patient, recommend she schedule the follow up with her primary. Will plan to attend the visit with the patient to discuss POC  Patient will be able to forward bend/put on shoes without provoking increased UE symptoms - Not Met  Patient will be able to lift/carry minimum of 20 pounds without provoking pain - Not Met  Patient will be able to perform right and left hip mobility without provoking pain  - able to perform when cervical spine is stabilized with support at cervical lordosis         Plan: As above. Continue PT with exercise as tolerated            Date: 2024   Tx#:  Date: 2024   Tx#:  Date: 2024   Tx#:  Date: 2024   Tx#:  Date: 2/15/2024   Tx#:    TherEx 30 min TherEx 30 min TherEx TherEx 45 minutes  TherEx 40 min   Nustep L5 8 minutes 2 minutes on treadmill - right lumbar pain, pain eliminated with manual cervical traction during gait  Treadmill 3 min 35 increased burning bilat UE, Elliptical 4 minutes Treadmill 7 minutes UE support decreases UE symptoms  Treadmill 6 minutes - little to no pain with proper posture - cervical flexion - UE symptoms    Frontal plane mobility bilat UE  on wall, transverse  plane combined with frontal plane bilat UE   Position change to supine - pain right lumbar spine, pain eliminated with cervical manual traction or support at lordosis  TRX squats > 20  reps     Squat with looking down - right side lumbar pain/burning in arms     Shuttle 6 bands squats 20 reps     Shuttle 5 bands single leg squats   Right = 50   Left = 50  Medicodz resisted side step, forward back. Pain eliminated with backward walk    Hip hinge - no symptoms  Quadruped rock with hip hinge - no pain, quadruped rocking with flexion - right side lumbar pain  TRX retraction < 20 reps prior to provoking symptoms in arms  Quad stretch 30 seconds x 3 each  4 pound medicine ball overhead lift - burning bilat UE < 20 reps    Quadruped UE/LE reach - pain at times with right LE reach  Cervical manual traction, PROM - no pain  10 pound suitcase carry, right side lumbar pain  Bent knee fallouts - right side lumbar/hip pain - reduction in pain with support at cervical lordosis  Standing cable pulldown 60 and 72 pounds > 20 reps each - no pain    Bent knee fallouts 20 reps each ICT - 15 minutes 13 pounds 30 second hold, 10 second relax  Right and left LE PROM with cervical lordosis support - no pain, without support - UE complaints, right side lumbar pain Knees to chest with SB - bilat UE symptoms, right LBP. Support at cervical lordosis reduces symptoms  SL thoracic mobility bilat UE symptoms right side LBP   LTR with cervical opposite - good   Bilateral UE ROM - no symptoms with cervical lordosis support, UE symptoms without support   Pelvic tilting - UE symptoms - repeated movement does not reduce symptoms. Support at cervical spine reduces  Leg load - pain with spinal flexion, support at lordosis decreases pain      ICT - 15 minutes 13 pounds 30 second hold, 10 second relax    Right hip PROM - no pain        Cervical PROM in supine - no pain with support at lordosis                       Charges: TherEx 3     Total Timed  Treatment:40  min  Total Treatment Time: 40 min

## 2024-02-20 ENCOUNTER — OFFICE VISIT (OUTPATIENT)
Dept: PHYSICAL THERAPY | Age: 54
End: 2024-02-20
Attending: ORTHOPAEDIC SURGERY
Payer: COMMERCIAL

## 2024-02-20 ENCOUNTER — ORDER TRANSCRIPTION (OUTPATIENT)
Dept: PHYSICAL THERAPY | Age: 54
End: 2024-02-20

## 2024-02-20 DIAGNOSIS — S22.000D COMPRESSION FRACTURE OF THORACIC VERTEBRA WITH ROUTINE HEALING: Primary | ICD-10-CM

## 2024-02-20 PROCEDURE — 97110 THERAPEUTIC EXERCISES: CPT | Performed by: PHYSICAL THERAPIST

## 2024-02-20 NOTE — PROGRESS NOTES
Dx:  Compression fracture of thoracic vertebra with routine healing, subsequent encounter (S22.000D)  Dorsalgia (M54.9)           Authorized # of Visits:  12  Fall Risk: standard         Precautions: n/a               Subjective:   Patient states she had severe pain 4 days ago when she was sitting holding her grandchild. She did not lift or carry him. Today she has mostly burning in her arms, and some right side LBP  Current Pain Ratin/10  Objective:   Treatment  session included use of cervical compression versus cervical extension and support at cervical lordosis to reduce pain   Prone on elbows - no reduction in UE c/o burning. Good pain reduction with compression   Cervical isometrics with compression - decreased pain versus isometrics with no compression        Assessment:   Patient clearly more uncomfortable during today's visit compared to the past several weeks. Position changes were difficult and there was decreased response with cervical extension compared with previous visits. Patient has scheduled a follow up visit with her  primary doctor and is awaiting an eval with the pain clinic.      Patient will be able to forward bend/put on shoes without provoking increased UE symptoms - Not Met  Patient will be able to lift/carry minimum of 20 pounds without provoking pain - Not Met  Patient will be able to perform right and left hip mobility without provoking pain  - able to perform when cervical spine is stabilized with support at cervical lordosis         Plan: As above. Continue PT with exercise as tolerated            Date: 2024   Tx#:  Date: 2024   Tx#:  Date: 2024   Tx#:  Date: 2024   Tx#:  Date: 2/15/2024   Tx#:  Date: 2024   Tx#: 10/12   TherEx 30 min TherEx 30 min TherEx TherEx 45 minutes  TherEx 40 min TherEx 45  min   Nustep L5 8 minutes 2 minutes on treadmill - right lumbar pain, pain eliminated with manual cervical traction during gait  Treadmill 3 min  35 increased burning bilat UE, Elliptical 4 minutes Treadmill 7 minutes UE support decreases UE symptoms  Treadmill 6 minutes - little to no pain with proper posture - cervical flexion - UE symptoms  Elliptical 7 minutes    Frontal plane mobility bilat UE  on wall, transverse plane combined with frontal plane bilat UE   Position change to supine - pain right lumbar spine, pain eliminated with cervical manual traction or support at lordosis  TRX squats > 20  reps     Squat with looking down - right side lumbar pain/burning in arms     Shuttle 6 bands squats 20 reps     Shuttle 5 bands single leg squats   Right = 50   Left = 50  Medicodz resisted side step, forward back. Pain eliminated with backward walk  Cervical isometrics standing increased bilat UE symptoms, manual cervical compression decreased symptoms    Hip hinge - no symptoms  Quadruped rock with hip hinge - no pain, quadruped rocking with flexion - right side lumbar pain  TRX retraction < 20 reps prior to provoking symptoms in arms  Quad stretch 30 seconds x 3 each  4 pound medicine ball overhead lift - burning bilat UE < 20 reps  Rhythmic stabilization contact point pelvis, pelvis/shoulders increased bilat UE symptoms, split stance increased to greater level    Quadruped UE/LE reach - pain at times with right LE reach  Cervical manual traction, PROM - no pain  10 pound suitcase carry, right side lumbar pain  Bent knee fallouts - right side lumbar/hip pain - reduction in pain with support at cervical lordosis  Standing cable pulldown 60 and 72 pounds > 20 reps each - no pain  Supine manual cervical traction with support at lordosis -no pain. Suboccipital release - bilat UE symptoms    Bent knee fallouts 20 reps each ICT - 15 minutes 13 pounds 30 second hold, 10 second relax  Right and left LE PROM with cervical lordosis support - no pain, without support - UE complaints, right side lumbar pain Knees to chest with SB - bilat UE symptoms, right  LBP. Support at cervical lordosis reduces symptoms  SL thoracic mobility bilat UE symptoms right side LBP Prone on elbows - no relief   Quadruped with tactile cues for posture decreased pain    LTR with cervical opposite - good   Bilateral UE ROM - no symptoms with cervical lordosis support, UE symptoms without support   Pelvic tilting - UE symptoms - repeated movement does not reduce symptoms. Support at cervical spine reduces  Leg load - pain with spinal flexion, support at lordosis decreases pain    Simulated seated holding/playing with grandchild bilat UE symptoms. Manual compression with neutral spine reduces pain. Cervical flexion increases pain    ICT - 15 minutes 13 pounds 30 second hold, 10 second relax    Right hip PROM - no pain         Cervical PROM in supine - no pain with support at lordosis                          Charges: TherEx 3     Total Timed Treatment:45  min  Total Treatment Time: 45 min

## 2024-02-22 ENCOUNTER — OFFICE VISIT (OUTPATIENT)
Dept: PHYSICAL THERAPY | Age: 54
End: 2024-02-22
Attending: ORTHOPAEDIC SURGERY
Payer: COMMERCIAL

## 2024-02-22 PROCEDURE — 97110 THERAPEUTIC EXERCISES: CPT | Performed by: PHYSICAL THERAPIST

## 2024-02-22 NOTE — PROGRESS NOTES
Dx:  Compression fracture of thoracic vertebra with routine healing, subsequent encounter (S22.000D)  Dorsalgia (M54.9)           Authorized # of Visits:  12  Fall Risk: standard         Precautions: n/a               Subjective:   Patient states she is much better today. She was able to walk 2 miles without increased pain. She worked to maintain her head up   Current Pain Ratin/10  Objective:   Treatment focused on multiple plane movement. Pain provoked with forward bending only and with position changes sit to and from supine   PRE - pain provoked with overhead lifting, no change with cues for head position        Assessment:   No difficulty with multiple plane movements with exception of cervical flexion - bilat UE symptoms increased. Overhead lifting - painful     Patient will be able to forward bend/put on shoes without provoking increased UE symptoms - Not Met  Patient will be able to lift/carry minimum of 20 pounds without provoking pain - Not Met  Patient will be able to perform right and left hip mobility without provoking pain  - able to perform when cervical spine is stabilized with support at cervical lordosis         Plan:  Continue with PRE and work to improve cervical flexion without provoking pain          Date: 2024   Tx#:  Date: 2024   Tx#:  Date: 2024   Tx#:  Date: 2024   Tx#:  Date: 2/15/2024   Tx#:  Date: 2024   Tx#: 10/12 Date: 2024   Tx#:    TherEx 30 min TherEx 30 min TherEx TherEx 45 minutes  TherEx 40 min TherEx 45  min TherEx 30 min   Nustep L5 8 minutes 2 minutes on treadmill - right lumbar pain, pain eliminated with manual cervical traction during gait  Treadmill 3 min 35 increased burning bilat UE, Elliptical 4 minutes Treadmill 7 minutes UE support decreases UE symptoms  Treadmill 6 minutes - little to no pain with proper posture - cervical flexion - UE symptoms  Elliptical 7 minutes  Nustep L6 6 minutes    Frontal plane mobility  bilat UE  on wall, transverse plane combined with frontal plane bilat UE   Position change to supine - pain right lumbar spine, pain eliminated with cervical manual traction or support at lordosis  TRX squats > 20  reps     Squat with looking down - right side lumbar pain/burning in arms     Shuttle 6 bands squats 20 reps     Shuttle 5 bands single leg squats   Right = 50   Left = 50  Medicodz resisted side step, forward back. Pain eliminated with backward walk  Cervical isometrics standing increased bilat UE symptoms, manual cervical compression decreased symptoms  TRX squats 20 reps    Hip hinge - no symptoms  Quadruped rock with hip hinge - no pain, quadruped rocking with flexion - right side lumbar pain  TRX retraction < 20 reps prior to provoking symptoms in arms  Quad stretch 30 seconds x 3 each  4 pound medicine ball overhead lift - burning bilat UE < 20 reps  Rhythmic stabilization contact point pelvis, pelvis/shoulders increased bilat UE symptoms, split stance increased to greater level  Cable chop 48 pounds 10 reps each, 60 pounds 5 reps x 2 each    Quadruped UE/LE reach - pain at times with right LE reach  Cervical manual traction, PROM - no pain  10 pound suitcase carry, right side lumbar pain  Bent knee fallouts - right side lumbar/hip pain - reduction in pain with support at cervical lordosis  Standing cable pulldown 60 and 72 pounds > 20 reps each - no pain  Supine manual cervical traction with support at lordosis -no pain. Suboccipital release - bilat UE symptoms  Bilat pulldown 48 pounds - pain < 30 reps    Bent knee fallouts 20 reps each ICT - 15 minutes 13 pounds 30 second hold, 10 second relax  Right and left LE PROM with cervical lordosis support - no pain, without support - UE complaints, right side lumbar pain Knees to chest with SB - bilat UE symptoms, right LBP. Support at cervical lordosis reduces symptoms  SL thoracic mobility bilat UE symptoms right side LBP Prone on elbows -  no relief   Quadruped with tactile cues for posture decreased pain  4 pound overhed lift static stance 10 reps - pain, forward step with lift 5 reps pain    LTR with cervical opposite - good   Bilateral UE ROM - no symptoms with cervical lordosis support, UE symptoms without support   Pelvic tilting - UE symptoms - repeated movement does not reduce symptoms. Support at cervical spine reduces  Leg load - pain with spinal flexion, support at lordosis decreases pain    Simulated seated holding/playing with grandchild bilat UE symptoms. Manual compression with neutral spine reduces pain. Cervical flexion increases pain  Closed chain training with bilat UE  frontal/sagittal and transverse plane, paint provoked with sagittal plane flexion    ICT - 15 minutes 13 pounds 30 second hold, 10 second relax    Right hip PROM - no pain          Cervical PROM in supine - no pain with support at lordosis                             Charges: TherEx 2     Total Timed Treatment:30   min  Total Treatment Time: 30  min

## 2024-02-27 ENCOUNTER — OFFICE VISIT (OUTPATIENT)
Dept: INTERNAL MEDICINE CLINIC | Facility: CLINIC | Age: 54
End: 2024-02-27
Payer: COMMERCIAL

## 2024-02-27 VITALS
WEIGHT: 199 LBS | HEART RATE: 94 BPM | SYSTOLIC BLOOD PRESSURE: 124 MMHG | DIASTOLIC BLOOD PRESSURE: 80 MMHG | BODY MASS INDEX: 31.98 KG/M2 | HEIGHT: 66 IN | RESPIRATION RATE: 16 BRPM

## 2024-02-27 DIAGNOSIS — E66.9 OBESITY (BMI 30.0-34.9): ICD-10-CM

## 2024-02-27 DIAGNOSIS — R63.5 WEIGHT GAIN: ICD-10-CM

## 2024-02-27 DIAGNOSIS — R79.89 ELEVATED LFTS: ICD-10-CM

## 2024-02-27 DIAGNOSIS — Z51.81 ENCOUNTER FOR THERAPEUTIC DRUG MONITORING: Primary | ICD-10-CM

## 2024-02-27 DIAGNOSIS — E55.9 VITAMIN D DEFICIENCY: ICD-10-CM

## 2024-02-27 DIAGNOSIS — F43.9 STRESS: ICD-10-CM

## 2024-02-27 PROCEDURE — 3008F BODY MASS INDEX DOCD: CPT | Performed by: NURSE PRACTITIONER

## 2024-02-27 PROCEDURE — 99214 OFFICE O/P EST MOD 30 MIN: CPT | Performed by: NURSE PRACTITIONER

## 2024-02-27 PROCEDURE — 3074F SYST BP LT 130 MM HG: CPT | Performed by: NURSE PRACTITIONER

## 2024-02-27 PROCEDURE — 3079F DIAST BP 80-89 MM HG: CPT | Performed by: NURSE PRACTITIONER

## 2024-02-27 RX ORDER — TIRZEPATIDE 2.5 MG/.5ML
2.5 INJECTION, SOLUTION SUBCUTANEOUS WEEKLY
Qty: 2 ML | Refills: 0 | Status: SHIPPED | OUTPATIENT
Start: 2024-02-27
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
## 2024-02-27 NOTE — PATIENT INSTRUCTIONS
Next steps:  1.  Fill your prescribed medication and take as discussed and prescribed: zepbound 2.5mg weekly x 4 weeks   2.  Schedule a personal nutrition consultation with one of our registered dieticians     Please try to work on the following dietary changes:  Daily protein recommendation to start:  grams  Daily carbohydrate: <110g  Daily calories: 1,300-1,400  1.  Drink water with meals and throughout the day, cut down on soda and/or juice if consumed. Consider flavored water options like Bubbly, Spindrift, Hint and Emily.  2.  Eat breakfast daily and focus on having protein with each meal, examples include: greek yogurt, cottage cheese, hard boiled egg, whole grain toast with peanut butter.   3.  Reduce refined carbohydrates and sugars which includes items such as sweets, as well as rice, pasta, and bread and make sure to choose whole grain options when having them with just 1 serving per meal about the size of your inner palm.  4.  Consume non starchy veggies daily working towards making them a good 50% of your daily food intake. Add them to lunch and dinner consistently.  5.  Start a daily probiotic: VSL#3 is recommended, (order on line at www.vsl3.com). Take 1 capsule daily with water for 30 days, then reduce to 1 every other day (this will reduce the cost). Capsules can be left out for 2 weeks, but then must be refrigerated.      Please download neyda My Fitness Pal, LoseIt! Or Net Diary to monitor daily dietary intake and you will be able to see if you are eating the right amount of calories or too much or too little which would hinder weight loss. Additionally this will help to see your daily carbohydrate and protein intake. When you set the neyda up choose 1-2 lbs/week as a goal.  Keeping a paper food journal is an option as well to remain accountable for your choices- this is the start to mindful eating! A low calorie diet has been consistently shown to support weight loss.     Continue or start  exercising to help establish a routine. If not already exercising begin with 1 day and progress as able with long-term goal of 30 minutes 5 days a week at a minimum.     Meditation daily can help manage and control stress. Chronic stress can make weight loss difficult.  Exercising is one way to help with stress, but meditation using the CALM Benji or another comparable alternative can be done in your home or place of work with little time commitment. This Benji can also help work on behavior change and improve sleep. Check out the segment under Calm Masterclass and listen to The 4 Pillars of Health. A great way to begin learning about the foundation of lifestyle with practical tips to use in your every day.     Check out www.yourweightmatters.org blog for continued daily support and education along this weight loss journey!    Patient Resources:     Personal Training/Fitness Classes/Health Coaching     [facility] @ [email or web address] Full fitness center with group fitness and personal training. Discount available as client of [facility].  Health Coaching and Personal Training with Martina Queen at our [facility]- individual weekly coaching with option to add personal training and small group fitness classes targeted at weight loss- [phone] and/or email @ [email or web address]  [employer]IT [place] [email or web address] Fitness [phone] and/or email Holly at [email or web address]  [facility] Fitness Centers with multiple locations: Adaptly (www.CoursePeer), Eat The Frog Fitness (www.Corceuticals.wiMAN), Fit Body Bootcamp (www.ProfitekbodybootOxagenp.wiMAN), ProPlan Fitness (www.TTS Pharma.wiMAN), The Exercise  (www.exercisecoach.wiMAN)     Online Fitness  Fitness  on WolfGIS  Fit in 10 DVD series- www.tarkz50PIO.com  Sit and Be Fit - Chair exercise series  Www.sitandbefit.org  Hip Hop Fit with Mike Cleaning at www.hiphopfit.net     Apps for on the Go Fitness  StyleChat by ProSent Mobile 7 Minute Workout (orange box with white 7) - free on the go HIIT training benji  Peloton Benji @ www.onepeloton.com     Nutrition Trackers and Tools  LoseIT! And My Fitness Pal apps and on line for tracking nutrition  NOOM - virtual health coaching  FitFoundation (healthy meals on the go) in [place] @ www.qspxyqxmazgxf3aVsevcredit.ru  Sam PILLAI @ wwwPoll Everywherebistromd.com and Cgmhap87 (keto and low carb plans recommended) @ www.hzmakp31.com, Metabolic Meals @ www.MyMetabolicMeals.com - individual prepared meals to go  Gobble, Blue Apron, Home , Every Plate, Sunbasket- on line meal delivery programs for preparation at home  Meal Village in [place] for homemade meals to go @ www.mealEmpower2adaptage.Basis Technology  Diet Doctor @ www.dietdoctor.Basis Technology - low carb swaps  Yummly - meal prep and planning benji (www.yummly.com)     Stress Management/Behavior/Mindful Eating  CALM meditation benji (www.calm.com)  Headspace  Am I Hungry? Mindful eating virtual  benji  Www.yourweightmatters.org - Obesity Action Coalition sponsored Blog posts daily  Motivation benji (black box with white \")- daily supportive messages sent to your phone     Books/Video Education/Podcasts  Mindless Eating by Raj Hung  Why We Get Sick by Jesus German (a book about insulin resistance)  Atomic Habits by Jax Pak (a book about taking small steps to promote greater behavior change)   Can't Hurt Me by Jackson Mena (a book exploring the power of discipline in achieving your goals)  The End of Dieting: How to Live for Life by Dr. Negrito Quinn M.D. or listen to The [employer] entertainment Podcast Episode 63: Understanding \"Nutritarian\" Eating w/Dr. Negrito Quinn  Your Body in Balance: The New Science of Food, Hormones, and Health by Dr. Filiberto Bah  The Menopause Diet Plan by Erika Butts and Josefina López  The Complete Guide to fasting by Dr. Mckeon  Sugar, Salt & Fat by Ale  Larissa, Ph.D, R.D.  Weight Loss Surgery Will Not Treat Food Addiction by Rina Torrez Ph.D  The Game Changers- Peerflixix Documentary on plant based nutrition  Fed Up - documentary about obesity (Free on Utube)  The Truth About Sugar - documentary on sugar (Free on Utube, https://youtu.be/6F3cjrvGC8b)  The Dr. Mcneil T5 Wellness Plan by Dr. Morgan Mcneil MD  Fitlosophy Fitspiration - journal to better health (found at [employer] in fitness aisle)  What Happened to You?- a look at the impact trauma has on behavior written by Monique Watts and Dr. Andrea Sarabia  Whole Again by Jani Norris - discovering your true self after trauma  Rayo Childs talk on Concept Inbox, The Call to Courage  Podcasts: The Exam Room by the Physician's Committee, Nutrition Facts by Dr. Bush    We are here to support you with weight loss, but please remember that you still need your primary care provider for your routine health maintenance.

## 2024-02-28 ENCOUNTER — OFFICE VISIT (OUTPATIENT)
Dept: PHYSICAL THERAPY | Age: 54
End: 2024-02-28
Attending: ORTHOPAEDIC SURGERY
Payer: COMMERCIAL

## 2024-02-28 PROCEDURE — 97110 THERAPEUTIC EXERCISES: CPT | Performed by: PHYSICAL THERAPIST

## 2024-02-28 NOTE — PROGRESS NOTES
Dx:  Compression fracture of thoracic vertebra with routine healing, subsequent encounter (S22.000D)  Dorsalgia (M54.9)           Authorized # of Visits:  12  Fall Risk: standard         Precautions: n/a               Subjective:   Right side back pain, burning bilat arms. Pain was increased after she was bending to do laundry. She did not walk yesterday or today   Current Pain Ratin/10  Objective:   Cervical flexion in standing, sitting,  supine and quadruped provokes increased bilat symptoms and right side lumbar pain.   Bilateral gross UE strength 5/5 with MMT.      Assessment:   Held off on PRE today. Performed essentially AROM exercises, spinal mobility only Patient continues to have little to no tolerance for cervical flexion. Support of cervical spine as well as direct compression in neutral position decrease pain.  She will be evaluated with pain clinic. She is currently not taking any pain medication.     Patient will be able to forward bend/put on shoes without provoking increased UE symptoms - Not Met  Patient will be able to lift/carry minimum of 20 pounds without provoking pain - Not Met  Patient will be able to perform right and left hip mobility without provoking pain  - able to perform when cervical spine is stabilized with support at cervical lordosis         Plan:  PT progressive exercise as tolerated          Date: 2024   Tx#:  Date: 2024   Tx#:  Date: 2024   Tx#:  Date: 2024   Tx#:  Date: 2/15/2024   Tx#:  Date: 2024   Tx#: 10/12 Date: 2024   Tx#:  Date; 2024   Tx#:     TherEx 30 min TherEx 30 min TherEx TherEx 45 minutes  TherEx 40 min TherEx 45  min TherEx 30 min    Nustep L5 8 minutes 2 minutes on treadmill - right lumbar pain, pain eliminated with manual cervical traction during gait  Treadmill 3 min 35 increased burning bilat UE, Elliptical 4 minutes Treadmill 7 minutes UE support decreases UE symptoms  Treadmill 6 minutes -  little to no pain with proper posture - cervical flexion - UE symptoms  Elliptical 7 minutes  Nustep L6 6 minutes  Elliptical 7 minutes   Frontal plane mobility bilat UE  on wall, transverse plane combined with frontal plane bilat UE   Position change to supine - pain right lumbar spine, pain eliminated with cervical manual traction or support at lordosis  TRX squats > 20  reps     Squat with looking down - right side lumbar pain/burning in arms     Shuttle 6 bands squats 20 reps     Shuttle 5 bands single leg squats   Right = 50   Left = 50  Medicodz resisted side step, forward back. Pain eliminated with backward walk  Cervical isometrics standing increased bilat UE symptoms, manual cervical compression decreased symptoms  TRX squats 20 reps  Quadruped rock with hinge  no pain, with flexion pain    Quadruped rhythmic stabilization with lordosis - no pain , with spinal flexion - pain    Hip hinge - no symptoms  Quadruped rock with hip hinge - no pain, quadruped rocking with flexion - right side lumbar pain  TRX retraction < 20 reps prior to provoking symptoms in arms  Quad stretch 30 seconds x 3 each  4 pound medicine ball overhead lift - burning bilat UE < 20 reps  Rhythmic stabilization contact point pelvis, pelvis/shoulders increased bilat UE symptoms, split stance increased to greater level  Cable chop 48 pounds 10 reps each, 60 pounds 5 reps x 2 each  LTR with cervical rotation opposite - UE symptoms. Placement of support at cervical lordosis no symptoms   Quadruped UE/LE reach - pain at times with right LE reach  Cervical manual traction, PROM - no pain  10 pound suitcase carry, right side lumbar pain  Bent knee fallouts - right side lumbar/hip pain - reduction in pain with support at cervical lordosis  Standing cable pulldown 60 and 72 pounds > 20 reps each - no pain  Supine manual cervical traction with support at lordosis -no pain. Suboccipital release - bilat UE symptoms  Bilat pulldown 48  pounds - pain < 30 reps  Leg load/hip flexion without support at cervical lordosis - UE symptoms, symptoms eliminated with support at cervical lordosis    Bent knee fallouts 20 reps each ICT - 15 minutes 13 pounds 30 second hold, 10 second relax  Right and left LE PROM with cervical lordosis support - no pain, without support - UE complaints, right side lumbar pain Knees to chest with SB - bilat UE symptoms, right LBP. Support at cervical lordosis reduces symptoms  SL thoracic mobility bilat UE symptoms right side LBP Prone on elbows - no relief   Quadruped with tactile cues for posture decreased pain  4 pound overhed lift static stance 10 reps - pain, forward step with lift 5 reps pain  UE ROM/flossing - no symptoms with support at cervical lordosis. Symptoms with no support    LTR with cervical opposite - good   Bilateral UE ROM - no symptoms with cervical lordosis support, UE symptoms without support   Pelvic tilting - UE symptoms - repeated movement does not reduce symptoms. Support at cervical spine reduces  Leg load - pain with spinal flexion, support at lordosis decreases pain    Simulated seated holding/playing with grandchild bilat UE symptoms. Manual compression with neutral spine reduces pain. Cervical flexion increases pain  Closed chain training with bilat UE  frontal/sagittal and transverse plane, paint provoked with sagittal plane flexion     ICT - 15 minutes 13 pounds 30 second hold, 10 second relax    Right hip PROM - no pain           Cervical PROM in supine - no pain with support at lordosis                                Charges: TherEx 3     Total Timed Treatment: 45   min  Total Treatment Time: 45   min

## 2024-03-05 ENCOUNTER — OFFICE VISIT (OUTPATIENT)
Dept: PHYSICAL THERAPY | Age: 54
End: 2024-03-05
Attending: ORTHOPAEDIC SURGERY
Payer: COMMERCIAL

## 2024-03-05 PROCEDURE — 97110 THERAPEUTIC EXERCISES: CPT | Performed by: PHYSICAL THERAPIST

## 2024-03-05 NOTE — PROGRESS NOTES
Dx:  Compression fracture of thoracic vertebra with routine healing, subsequent encounter (S22.000D)  Dorsalgia (M54.9)           Authorized # of Visits:  22  Fall Risk: standard         Precautions: n/a               Subjective:     Current Pain Ratin/10  Objective:   Bilateral UE symptoms with forward bending combined with cervical flexion   SL thoracic rotation - no increase in symptoms   Posterior pelvic tilt - provokes bilat UE symptoms. Manual hold at cervical lordosis decreases symptoms       Assessment:   Pain remains variable from day to day. Discussed progress with patient. She is not scheduled for pain management until April. Will continue discussion during next visit to determine if there is benefit from continuing or hold therapy until she is evaluated by pain management.   Patient will be able to forward bend/put on shoes without provoking increased UE symptoms - Not Met  Patient will be able to lift/carry minimum of 20 pounds without provoking pain - Not Met  Patient will be able to perform right and left hip mobility without provoking pain  - able to perform when cervical spine is stabilized with support at cervical lordosis         Plan: As above          Date: 2024   Tx#:  Date: 2024   Tx#:  Date: 2024   Tx#:  Date: 2024   Tx#:  Date: 2/15/2024   Tx#:  Date: 2024   Tx#: 10/12 Date: 2024   Tx#:  Date; 2024   Tx#:   Date:3/5/2024   Tx#:   New prescription   TherEx 30 min TherEx 30 min TherEx TherEx 45 minutes  TherEx 40 min TherEx 45  min TherEx 30 min  TherEx 45 min   Nustep L5 8 minutes 2 minutes on treadmill - right lumbar pain, pain eliminated with manual cervical traction during gait  Treadmill 3 min 35 increased burning bilat UE, Elliptical 4 minutes Treadmill 7 minutes UE support decreases UE symptoms  Treadmill 6 minutes - little to no pain with proper posture - cervical flexion - UE symptoms  Elliptical 7 minutes  Nustep  L6 6 minutes  Elliptical 7 minutes Elliptical 8 minutes    Frontal plane mobility bilat UE  on wall, transverse plane combined with frontal plane bilat UE   Position change to supine - pain right lumbar spine, pain eliminated with cervical manual traction or support at lordosis  TRX squats > 20  reps     Squat with looking down - right side lumbar pain/burning in arms     Shuttle 6 bands squats 20 reps     Shuttle 5 bands single leg squats   Right = 50   Left = 50  Medicodz resisted side step, forward back. Pain eliminated with backward walk  Cervical isometrics standing increased bilat UE symptoms, manual cervical compression decreased symptoms  TRX squats 20 reps  Quadruped rock with hinge  no pain, with flexion pain    Quadruped rhythmic stabilization with lordosis - no pain , with spinal flexion - pain  TRX squats 20 reps    Hip hinge - no symptoms  Quadruped rock with hip hinge - no pain, quadruped rocking with flexion - right side lumbar pain  TRX retraction < 20 reps prior to provoking symptoms in arms  Quad stretch 30 seconds x 3 each  4 pound medicine ball overhead lift - burning bilat UE < 20 reps  Rhythmic stabilization contact point pelvis, pelvis/shoulders increased bilat UE symptoms, split stance increased to greater level  Cable chop 48 pounds 10 reps each, 60 pounds 5 reps x 2 each  LTR with cervical rotation opposite - UE symptoms. Placement of support at cervical lordosis no symptoms SL thoracic rotation SL 10 reps each    Quadruped UE/LE reach - pain at times with right LE reach  Cervical manual traction, PROM - no pain  10 pound suitcase carry, right side lumbar pain  Bent knee fallouts - right side lumbar/hip pain - reduction in pain with support at cervical lordosis  Standing cable pulldown 60 and 72 pounds > 20 reps each - no pain  Supine manual cervical traction with support at lordosis -no pain. Suboccipital release - bilat UE symptoms  Bilat pulldown 48 pounds - pain < 30 reps   Leg load/hip flexion without support at cervical lordosis - UE symptoms, symptoms eliminated with support at cervical lordosis  SLR with hip abd/add 5 reps x 3 each   Hip/knee flex/exten no touch    Bent knee fallouts 20 reps each ICT - 15 minutes 13 pounds 30 second hold, 10 second relax  Right and left LE PROM with cervical lordosis support - no pain, without support - UE complaints, right side lumbar pain Knees to chest with SB - bilat UE symptoms, right LBP. Support at cervical lordosis reduces symptoms  SL thoracic mobility bilat UE symptoms right side LBP Prone on elbows - no relief   Quadruped with tactile cues for posture decreased pain  4 pound overhed lift static stance 10 reps - pain, forward step with lift 5 reps pain  UE ROM/flossing - no symptoms with support at cervical lordosis. Symptoms with no support  Bridge 20 reps    LTR with cervical opposite - good   Bilateral UE ROM - no symptoms with cervical lordosis support, UE symptoms without support   Pelvic tilting - UE symptoms - repeated movement does not reduce symptoms. Support at cervical spine reduces  Leg load - pain with spinal flexion, support at lordosis decreases pain    Simulated seated holding/playing with [employer] bilat UE symptoms. Manual compression with neutral spine reduces pain. Cervical flexion increases pain  Closed chain training with bilat UE  frontal/sagittal and transverse plane, paint provoked with sagittal plane flexion   Cervical isometrics all planes no change in symptoms    ICT - 15 minutes 13 pounds 30 second hold, 10 second relax    Right hip PROM - no pain      Forward step with same side bilat reach 10 reps each       Cervical PROM in supine - no pain with support at lordosis      Hip abduct/add with bilat UE  10 reps each           Anterior step with bilat overhead reach 5 reps each            Cervical flexion in supine with posterior pelvic tilt - no change in UE symptoms - symptoms are always present.  Anterior/post tilt no cervical motion - increased UE symptoms        Charges: TherEx 3     Total Timed Treatment: 45   min  Total Treatment Time: 45   min

## 2024-03-12 ENCOUNTER — OFFICE VISIT (OUTPATIENT)
Dept: PHYSICAL THERAPY | Age: 54
End: 2024-03-12
Attending: ORTHOPAEDIC SURGERY
Payer: COMMERCIAL

## 2024-03-12 PROCEDURE — 97110 THERAPEUTIC EXERCISES: CPT | Performed by: PHYSICAL THERAPIST

## 2024-03-12 PROCEDURE — 97530 THERAPEUTIC ACTIVITIES: CPT | Performed by: PHYSICAL THERAPIST

## 2024-03-12 NOTE — PROGRESS NOTES
Dx:  Compression fracture of thoracic vertebra with routine healing, subsequent encounter (S22.000D)  Dorsalgia (M54.9)           Authorized # of Visits:  22  Fall Risk: standard         Precautions: n/a               Subjective:   Burning in both arms. Patient states she also has a headache. She had an ultrasound of her liver. She had to lie on her back for about 30 minutes. There was no pillow  Current Pain Ratin/10  Objective:   Patient indicated frontal headache. Headache increased with lifting 6 pounds overhead. Stabilizing cervical spine with manual support in supine and with cervical compression decreased pain. Right and left SLR increased headache pain         Assessment:   Patient presented with headache that was exacerbated with lifting and when positioned without cervical support at lordosis. Patient was clearly uncomfortable with immediate exacerbation of pain with no support. Treatment session included extensive discussion of POC. She has been discharged by orthopedic doctor. She does not have visit with primary doctor until end of May. Recommended a follow up at earlier date. Patient agreeable to staff message to her primary physician to discuss care.   Patient will be able to forward bend/put on shoes without provoking increased UE symptoms - Not Met  Patient will be able to lift/carry minimum of 20 pounds without provoking pain - Not Met  Patient will be able to perform right and left hip mobility without provoking pain  - able to perform when cervical spine is stabilized with support at cervical lordosis         Plan: As above. Patient will continue PT at least one more visit.           Date: 2024   Tx#:  Date; 2024   Tx#:   Date:3/5/2024   Tx#:   New prescription Date: 3/12/2024   Tx#:      TherEx 30 min  TherEx 45 min TherEx 15 min   Nustep L6 6 minutes  Elliptical 7 minutes Elliptical 8 minutes  Elliptical 8 minutes   TRX squats 20 reps  Quadruped rock with hinge   no pain, with flexion pain    Quadruped rhythmic stabilization with lordosis - no pain , with spinal flexion - pain  TRX squats 20 reps  Cervical isometrics - good strength no change in pain    Cable chop 48 pounds 10 reps each, 60 pounds 5 reps x 2 each  LTR with cervical rotation opposite - UE symptoms. Placement of support at cervical lordosis no symptoms SL thoracic rotation SL 10 reps each  6 pound medicine ball overhead lift/chop < 10 reps each - headache increased    Bilat pulldown 48 pounds - pain < 30 reps  Leg load/hip flexion without support at cervical lordosis - UE symptoms, symptoms eliminated with support at cervical lordosis  SLR with hip abd/add 5 reps x 3 each   Hip/knee flex/exten no touch  Supine manual support at cervical lordosis - compression - decreased headache pain   4 pound overhed lift static stance 10 reps - pain, forward step with lift 5 reps pain  UE ROM/flossing - no symptoms with support at cervical lordosis. Symptoms with no support  Bridge 20 reps  Passive SLR right and left LE - increased headache    Closed chain training with bilat UE  frontal/sagittal and transverse plane, paint provoked with sagittal plane flexion   Cervical isometrics all planes no change in symptoms  Therapeutic activities - review of posture/mechanics potential source of symptoms      Forward step with same side bilat reach 10 reps each       Hip abduct/add with bilat UE  10 reps each      Anterior step with bilat overhead reach 5 reps each       Cervical flexion in supine with posterior pelvic tilt - no change in UE symptoms - symptoms are always present. Anterior/post tilt no cervical motion - increased UE symptoms         Charges: TherEx, therapeutic activities 2    Total Timed Treatment: 45   min  Total Treatment Time: 45   min

## 2024-03-13 ENCOUNTER — TELEPHONE (OUTPATIENT)
Dept: INTERNAL MEDICINE CLINIC | Facility: CLINIC | Age: 54
End: 2024-03-13

## 2024-03-13 ENCOUNTER — TELEPHONE (OUTPATIENT)
Dept: PHYSICAL THERAPY | Age: 54
End: 2024-03-13

## 2024-03-13 NOTE — TELEPHONE ENCOUNTER
Schedule pt on   Future Appointments   Date Time Provider Department Center   3/14/2024  1:30 PM Paloma Hall APRN [facility]
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
## 2024-03-14 ENCOUNTER — OFFICE VISIT (OUTPATIENT)
Dept: INTERNAL MEDICINE CLINIC | Facility: CLINIC | Age: 54
End: 2024-03-14
Payer: COMMERCIAL

## 2024-03-14 ENCOUNTER — APPOINTMENT (OUTPATIENT)
Dept: PHYSICAL THERAPY | Age: 54
End: 2024-03-14
Attending: ORTHOPAEDIC SURGERY
Payer: COMMERCIAL

## 2024-03-14 VITALS
TEMPERATURE: 97 F | WEIGHT: 198.63 LBS | BODY MASS INDEX: 32 KG/M2 | OXYGEN SATURATION: 97 % | DIASTOLIC BLOOD PRESSURE: 82 MMHG | HEART RATE: 89 BPM | SYSTOLIC BLOOD PRESSURE: 132 MMHG

## 2024-03-14 DIAGNOSIS — R79.89 ELEVATED LFTS: ICD-10-CM

## 2024-03-14 DIAGNOSIS — M79.602 PARESTHESIA AND PAIN OF BOTH UPPER EXTREMITIES: ICD-10-CM

## 2024-03-14 DIAGNOSIS — M79.601 PARESTHESIA AND PAIN OF BOTH UPPER EXTREMITIES: ICD-10-CM

## 2024-03-14 DIAGNOSIS — S22.089D CLOSED FRACTURE OF TWELFTH THORACIC VERTEBRA WITH ROUTINE HEALING, UNSPECIFIED FRACTURE MORPHOLOGY, SUBSEQUENT ENCOUNTER: ICD-10-CM

## 2024-03-14 DIAGNOSIS — Z87.828 HISTORY OF MOTOR VEHICLE ACCIDENT: ICD-10-CM

## 2024-03-14 DIAGNOSIS — R51.9 NONINTRACTABLE EPISODIC HEADACHE, UNSPECIFIED HEADACHE TYPE: Primary | ICD-10-CM

## 2024-03-14 DIAGNOSIS — Z87.81 HISTORY OF RIB FRACTURE: ICD-10-CM

## 2024-03-14 DIAGNOSIS — Z12.31 ENCOUNTER FOR SCREENING MAMMOGRAM FOR MALIGNANT NEOPLASM OF BREAST: ICD-10-CM

## 2024-03-14 DIAGNOSIS — S22.019D CLOSED FRACTURE OF FIRST THORACIC VERTEBRA WITH ROUTINE HEALING, UNSPECIFIED FRACTURE MORPHOLOGY, SUBSEQUENT ENCOUNTER: ICD-10-CM

## 2024-03-14 DIAGNOSIS — R20.2 PARESTHESIA AND PAIN OF BOTH UPPER EXTREMITIES: ICD-10-CM

## 2024-03-14 PROBLEM — Z51.81 ENCOUNTER FOR THERAPEUTIC DRUG MONITORING: Status: RESOLVED | Noted: 2018-12-26 | Resolved: 2024-03-14

## 2024-03-14 PROBLEM — S32.009A CLOSED FRACTURE OF TRANSVERSE PROCESS OF LUMBAR VERTEBRA (HCC): Status: RESOLVED | Noted: 2023-05-09 | Resolved: 2024-03-14

## 2024-03-14 PROCEDURE — 3075F SYST BP GE 130 - 139MM HG: CPT | Performed by: NURSE PRACTITIONER

## 2024-03-14 PROCEDURE — 99214 OFFICE O/P EST MOD 30 MIN: CPT | Performed by: NURSE PRACTITIONER

## 2024-03-14 PROCEDURE — 3079F DIAST BP 80-89 MM HG: CPT | Performed by: NURSE PRACTITIONER
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
## 2024-03-19 ENCOUNTER — OFFICE VISIT (OUTPATIENT)
Dept: PHYSICAL THERAPY | Age: 54
End: 2024-03-19
Attending: ORTHOPAEDIC SURGERY
Payer: COMMERCIAL

## 2024-03-19 PROCEDURE — 97110 THERAPEUTIC EXERCISES: CPT | Performed by: PHYSICAL THERAPIST

## 2024-03-19 NOTE — PROGRESS NOTES
Dx:  Compression fracture of thoracic vertebra with routine healing, subsequent encounter (S22.000D)  Dorsalgia (M54.9)           Authorized # of Visits:  22  Fall Risk: standard         Precautions: n/a               Subjective:   Burning in both arms is unchanged. She had follow up with her primary physician's office. She has been referred to neurosurgery for further evaluation.   Current Pain Ratin/10 No headache today    Objective:   Position changes -  increased bilateral UE symptoms   Cervical flexion - increased bilateral UE symptoms. Hip hinge with maintaining proper cervical position - no increase in symptoms      Assessment:   Bilateral UE symptoms are unchanged. Discussed progress with patient. Will re-eval next visit. Patient will follow up with neurosurgery. Therapy to be placed on hold as patient has consult with neurosurgery.      Patient will be able to forward bend/put on shoes without provoking increased UE symptoms - Not Met  Patient will be able to lift/carry minimum of 20 pounds without provoking pain - Not Met  Patient will be able to perform right and left hip mobility without provoking pain  - able to perform when cervical spine is stabilized with support at cervical lordosis         Plan:  PT one more visit          Date: 2024   Tx#:  Date; 2024   Tx#:   Date:3/5/2024   Tx#:   New prescription Date: 3/12/2024   Tx#:    Date: 3/19/2024   Tx#: 15/20   TherEx 30 min  TherEx 45 min TherEx 15 min TherEx 50 min   Nustep L6 6 minutes  Elliptical 7 minutes Elliptical 8 minutes  Elliptical 8 minutes Elliptical 6 minutes   TRX squats 20 reps  Quadruped rock with hinge  no pain, with flexion pain    Quadruped rhythmic stabilization with lordosis - no pain , with spinal flexion - pain  TRX squats 20 reps  Cervical isometrics - good strength no change in pain  High knees, high knees with same side reaches increased UE symptoms , braiding, dynamic hamstrings - right  lumbar pain   Cable chop 48 pounds 10 reps each, 60 pounds 5 reps x 2 each  LTR with cervical rotation opposite - UE symptoms. Placement of support at cervical lordosis no symptoms SL thoracic rotation SL 10 reps each  6 pound medicine ball overhead lift/chop < 10 reps each - headache increased  Hip hinge training - no increase in pain with proper hinge     Bilat pulldown 48 pounds - pain < 30 reps  Leg load/hip flexion without support at cervical lordosis - UE symptoms, symptoms eliminated with support at cervical lordosis  SLR with hip abd/add 5 reps x 3 each   Hip/knee flex/exten no touch  Supine manual support at cervical lordosis - compression - decreased headache pain Quadruped rocking with hip hinge - no right lumbar pain    4 pound overhed lift static stance 10 reps - pain, forward step with lift 5 reps pain  UE ROM/flossing - no symptoms with support at cervical lordosis. Symptoms with no support  Bridge 20 reps  Passive SLR right and left LE - increased headache  Cervical isometrics in standing - no pain increase in symptoms      Closed chain training with bilat UE  frontal/sagittal and transverse plane, paint provoked with sagittal plane flexion   Cervical isometrics all planes no change in symptoms  Therapeutic activities - review of posture/mechanics potential source of symptoms  Supine cervical rotation - increased bilat UE symptoms      Forward step with same side bilat reach 10 reps each   Right and left hip passive hip ROM, figure four stretch 4 reps x 20 seconds each - manual hold pelvis with right sided stretch      Hip abduct/add with bilat UE  10 reps each  Cervical PROM in supine - performed with support at cervical lordosis to avoid increased symptoms      Anterior step with bilat overhead reach 5 reps each        Cervical flexion in supine with posterior pelvic tilt - no change in UE symptoms - symptoms are always present. Anterior/post tilt no cervical motion - increased UE  symptoms          Charges: TherEx 3     Total Timed Treatment: 50    min  Total Treatment Time: 50    min

## 2024-03-21 ENCOUNTER — OFFICE VISIT (OUTPATIENT)
Dept: PHYSICAL THERAPY | Age: 54
End: 2024-03-21
Attending: ORTHOPAEDIC SURGERY
Payer: COMMERCIAL

## 2024-03-21 PROCEDURE — 97110 THERAPEUTIC EXERCISES: CPT | Performed by: PHYSICAL THERAPIST

## 2024-03-21 NOTE — PROGRESS NOTES
Dx:  Compression fracture of thoracic vertebra with routine healing, subsequent encounter (S22.000D)  Dorsalgia (M54.9)           Authorized # of Visits:  22  Fall Risk: standard         Precautions: n/a            Discharge Summary  Pt has attended 16 visits in Physical Therapy.        Subjective:   Pain 4/10. Bilateral arm burning.     Objective:   Cervical PROM: WNL - minimal pain with cervical rotation when performed with support at cervical lordosis. Cervical flexion active/passive provokes bilateral UE symptoms and at times right side lumbar pain. Active cervical rotation performed in standing, decreased ROM. Cervical extension decreases symptoms performed actively, passively and in quadruped.   UE/LE strength: 5/5 with MMT. : right = 70 pounds, left = 60 pounds. Cervical isometrics good strength - no pain. Supine hip flexion provokes right side lumbar pain when cervical lordosis is not supported.  She is able to squat without provoking pain when cervical alignment is maintained. Spinal forward bending - bilateral UE symptoms. Hip hinge - no increase in symptoms   Position changes rolling, sit to and from supine provoke increased UE symptoms.   Spurling's - negative, compression of cervical spine in supine as well as support at cervical lordosis in supine decreases symptoms. Manual traction of cervical spine reduces symptoms only with support at cervical lordosis. Lower cervical central PA decreases bilat UE symptoms. Supine lying without support at cervical lordosis/head provokes increased symptoms        Assessment:   Patient is no longer making progress during therapy sessions. She continues to experience bilateral UE symptoms and at times has right sided lumbar pain. UE symptoms and lumbar pain are consistently provoked with cervical flexion including in standing as well as in supine/sitting. In addition, she is unable to make position changes without provoking symptoms.  Pain is decreased with support  at cervical lordosis. There is not complete relief of bilateral UE complaints.      At this time, recommend additional evaluation of cervical spine. UE symptoms appear to be related to changes in cervical spine alignment during functional activities, cervical ROM.  She is only able to avoid increased symptoms during forward bending  by performing a hip hinge versus allowing for cervical flexion.     Patient will be able to forward bend/put on shoes without provoking increased UE symptoms - Not Met  Patient will be able to lift/carry minimum of 20 pounds without provoking pain - Not Met  Patient will be able to perform right and left hip mobility without provoking pain  - able to perform when cervical spine is stabilized with support at cervical lordosis             Plan: As above        Patient/Family/Caregiver was advised of these findings, precautions, and treatment options and has agreed to actively participate in planning and for this course of care.    Thank you for your referral. If you have any questions, please contact me at Dept: [phone].    Sincerely,  Electronically signed by therapist: Dara Godfrey PT     Physician's certification required:  No  Please co-sign or sign and return this letter via fax as soon as possible to [phone].   I certify the need for these services furnished under this plan of treatment and while under my care.    X___________________________________________________ Date____________________    Certification From: 3/21/2024  To:6/19/2024           Date: 2/22/2024   Tx#: 11/12 Date; 2/28/2024   Tx#: 12/12  Date:3/5/2024   Tx#: 13/20  New prescription Date: 3/12/2024   Tx#: 14/20   Date: 3/19/2024   Tx#: 15/20 Date: 3/21/2024   Tx#: 16/20   TherEx 30 min  TherEx 45 min TherEx 15 min TherEx 50 min TherEx 30 min    Nustep L6 6 minutes  Elliptical 7 minutes Elliptical 8 minutes  Elliptical 8 minutes Elliptical 6 minutes Elliptical 6 minutes   TRX squats 20 reps  Quadruped rock  with hinge  no pain, with flexion pain    Quadruped rhythmic stabilization with lordosis - no pain , with spinal flexion - pain  TRX squats 20 reps  Cervical isometrics - good strength no change in pain  High knees, high knees with same side reaches increased UE symptoms , braiding, dynamic hamstrings - right lumbar pain TRX bilateral shoulder flexion > 10 reps neutral cervical spine - mild bilat UE symptoms. Cervical flexion - bilat UE symptoms, right lumbar pain    Cable chop 48 pounds 10 reps each, 60 pounds 5 reps x 2 each  LTR with cervical rotation opposite - UE symptoms. Placement of support at cervical lordosis no symptoms SL thoracic rotation SL 10 reps each  6 pound medicine ball overhead lift/chop < 10 reps each - headache increased  Hip hinge training - no increase in pain with proper hinge   Closed chain hip IR bilat UE , closed chain frontal plane mobility - good, high knees with arms overhead increased lumbar pain/UE symptoms - no arms up - good    Bilat pulldown 48 pounds - pain < 30 reps  Leg load/hip flexion without support at cervical lordosis - UE symptoms, symptoms eliminated with support at cervical lordosis  SLR with hip abd/add 5 reps x 3 each   Hip/knee flex/exten no touch  Supine manual support at cervical lordosis - compression - decreased headache pain Quadruped rocking with hip hinge - no right lumbar pain  Hamstring/calf mobility on step - no forward bending    4 pound overhed lift static stance 10 reps - pain, forward step with lift 5 reps pain  UE ROM/flossing - no symptoms with support at cervical lordosis. Symptoms with no support  Bridge 20 reps  Passive SLR right and left LE - increased headache  Cervical isometrics in standing - no pain increase in symptoms    Deep neck flexor activation - bilat UE symptoms 3 reps    Closed chain training with bilat UE  frontal/sagittal and transverse plane, paint provoked with sagittal plane flexion   Cervical isometrics all planes  no change in symptoms  Therapeutic activities - review of posture/mechanics potential source of symptoms  Supine cervical rotation - increased bilat UE symptoms  Hip flexion right and left LE -  no symptoms with support at cervical lordosis      Forward step with same side bilat reach 10 reps each   Right and left hip passive hip ROM, figure four stretch 4 reps x 20 seconds each - manual hold pelvis with right sided stretch  Quadruped rock with hip hinge - good, quadruped rock with spinal flexion - bilat UE symptoms      Hip abduct/add with bilat UE  10 reps each  Cervical PROM in supine - performed with support at cervical lordosis to avoid increased symptoms  Quad stretch right side tighter than left      Anterior step with bilat overhead reach 5 reps each    Lower cervical PAS/support at cervical spine reduce bilat UE symptoms      Cervical flexion in supine with posterior pelvic tilt - no change in UE symptoms - symptoms are always present. Anterior/post tilt no cervical motion - increased UE symptoms           Charges: TherEx 3      Total Timed Treatment: 50    min  Total Treatment Time: 50 min

## 2024-03-25 ENCOUNTER — OFFICE VISIT (OUTPATIENT)
Dept: SURGERY | Facility: CLINIC | Age: 54
End: 2024-03-25
Payer: COMMERCIAL

## 2024-03-25 VITALS
BODY MASS INDEX: 31.18 KG/M2 | HEART RATE: 96 BPM | DIASTOLIC BLOOD PRESSURE: 74 MMHG | OXYGEN SATURATION: 97 % | SYSTOLIC BLOOD PRESSURE: 130 MMHG | HEIGHT: 66 IN | WEIGHT: 194 LBS

## 2024-03-25 DIAGNOSIS — Z87.81 HISTORY OF VERTEBRAL COMPRESSION FRACTURE: ICD-10-CM

## 2024-03-25 DIAGNOSIS — M54.12 CERVICAL RADICULOPATHY: Primary | ICD-10-CM

## 2024-03-25 DIAGNOSIS — M54.50 CHRONIC BILATERAL LOW BACK PAIN WITHOUT SCIATICA: ICD-10-CM

## 2024-03-25 DIAGNOSIS — M54.6 CHRONIC MIDLINE THORACIC BACK PAIN: ICD-10-CM

## 2024-03-25 DIAGNOSIS — G89.29 CHRONIC MIDLINE THORACIC BACK PAIN: ICD-10-CM

## 2024-03-25 DIAGNOSIS — G89.29 CHRONIC BILATERAL LOW BACK PAIN WITHOUT SCIATICA: ICD-10-CM
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
## 2024-03-25 NOTE — PATIENT INSTRUCTIONS
Refill policies:    Allow 2-3 business days for refills; controlled substances may take longer.  Contact your pharmacy at least 5 days prior to running out of medication and have them send an electronic request or submit request through the “request refill” option in your [employer] account.  Refills are not addressed on weekends; covering physicians do not authorize routine medications on weekends.  No narcotics or controlled substances are refilled after noon on Fridays or by on call physicians.  By law, narcotics must be electronically prescribed.  A 30 day supply with no refills is the maximum allowed.  If your prescription is due for a refill, you may be due for a follow up appointment.  To best provide you care, patients receiving routine medications need to be seen at least once a year.  Patients receiving narcotic/controlled substance medications need to be seen at least once every 3 months.  In the event that your preferred pharmacy does not have the requested medication in stock (e.g. Backordered), it is your responsibility to find another pharmacy that has the requested medication available.  We will gladly send a new prescription to that pharmacy at your request.    Scheduling Tests:    If your physician has ordered radiology tests such as MRI or CT scans, please contact Central Scheduling at [phone] right away to schedule the test.  Once scheduled, the [facility] Centralized Referral Team will work with your insurance carrier to obtain pre-certification or prior authorization.  Depending on your insurance carrier, approval may take 3-10 days.  It is highly recommended patients assure they have received an authorization before having a test performed.  If test is done without insurance authorization, patient may be responsible for the entire amount billed.      Precertification and Prior Authorizations:  If your physician has recommended that you have a procedure or additional testing performed the [facility]  Centralized Referral Team will contact your insurance carrier to obtain pre-certification or prior authorization.    You are strongly encouraged to contact your insurance carrier to verify that your procedure/test has been approved and is a COVERED benefit.  Although the [facility] Centralized Referral Team does its due diligence, the insurance carrier gives the disclaimer that \"Although the procedure is authorized, this does not guarantee payment.\"    Ultimately the patient is responsible for payment.   Thank you for your understanding in this matter.  Paperwork Completion:  If you require FMLA or disability paperwork for your recovery, please make sure to either drop it off or have it faxed to our office at [phone]. Be sure the form has your name and date of birth on it.  The form will be faxed to our Forms Department and they will complete it for you.  There is a 25$ fee for all forms that need to be filled out.  Please be aware there is a 10-14 day turnaround time.  You will need to sign a release of information (ZARINA) form if your paperwork does not come with one.  You may call the Forms Department with any questions at [phone].  Their fax number is [phone].

## 2024-04-17 ENCOUNTER — OFFICE VISIT (OUTPATIENT)
Dept: SURGERY | Facility: CLINIC | Age: 54
End: 2024-04-17
Payer: COMMERCIAL

## 2024-04-17 VITALS
BODY MASS INDEX: 31 KG/M2 | WEIGHT: 194 LBS | OXYGEN SATURATION: 97 % | HEART RATE: 86 BPM | DIASTOLIC BLOOD PRESSURE: 74 MMHG | SYSTOLIC BLOOD PRESSURE: 126 MMHG

## 2024-04-17 DIAGNOSIS — M54.50 CHRONIC MIDLINE LOW BACK PAIN WITHOUT SCIATICA: ICD-10-CM

## 2024-04-17 DIAGNOSIS — M54.12 CERVICAL RADICULOPATHY: Primary | ICD-10-CM

## 2024-04-17 DIAGNOSIS — G89.29 CHRONIC MIDLINE THORACIC BACK PAIN: ICD-10-CM

## 2024-04-17 DIAGNOSIS — M54.6 CHRONIC MIDLINE THORACIC BACK PAIN: ICD-10-CM

## 2024-04-17 DIAGNOSIS — G89.29 CHRONIC MIDLINE LOW BACK PAIN WITHOUT SCIATICA: ICD-10-CM

## 2024-04-17 PROCEDURE — 99215 OFFICE O/P EST HI 40 MIN: CPT

## 2024-04-17 PROCEDURE — 3074F SYST BP LT 130 MM HG: CPT

## 2024-04-17 PROCEDURE — 3078F DIAST BP <80 MM HG: CPT

## 2024-04-17 NOTE — PROGRESS NOTES
Patient is here today for follow up regarding Cervical, thoracic and lumbar radiculopathy- review imaging from [facility] 1/18/2024

## 2024-04-17 NOTE — PATIENT INSTRUCTIONS
Refill policies:    Allow 2-3 business days for refills; controlled substances may take longer.  Contact your pharmacy at least 5 days prior to running out of medication and have them send an electronic request or submit request through the “request refill” option in your [employer] account.  Refills are not addressed on weekends; covering physicians do not authorize routine medications on weekends.  No narcotics or controlled substances are refilled after noon on Fridays or by on call physicians.  By law, narcotics must be electronically prescribed.  A 30 day supply with no refills is the maximum allowed.  If your prescription is due for a refill, you may be due for a follow up appointment.  To best provide you care, patients receiving routine medications need to be seen at least once a year.  Patients receiving narcotic/controlled substance medications need to be seen at least once every 3 months.  In the event that your preferred pharmacy does not have the requested medication in stock (e.g. Backordered), it is your responsibility to find another pharmacy that has the requested medication available.  We will gladly send a new prescription to that pharmacy at your request.    Scheduling Tests:    If your physician has ordered radiology tests such as MRI or CT scans, please contact Central Scheduling at [phone] right away to schedule the test.  Once scheduled, the [facility] Centralized Referral Team will work with your insurance carrier to obtain pre-certification or prior authorization.  Depending on your insurance carrier, approval may take 3-10 days.  It is highly recommended patients assure they have received an authorization before having a test performed.  If test is done without insurance authorization, patient may be responsible for the entire amount billed.      Precertification and Prior Authorizations:  If your physician has recommended that you have a procedure or additional testing performed the [facility]  Centralized Referral Team will contact your insurance carrier to obtain pre-certification or prior authorization.    You are strongly encouraged to contact your insurance carrier to verify that your procedure/test has been approved and is a COVERED benefit.  Although the [facility] Centralized Referral Team does its due diligence, the insurance carrier gives the disclaimer that \"Although the procedure is authorized, this does not guarantee payment.\"    Ultimately the patient is responsible for payment.   Thank you for your understanding in this matter.  Paperwork Completion:  If you require FMLA or disability paperwork for your recovery, please make sure to either drop it off or have it faxed to our office at [phone]. Be sure the form has your name and date of birth on it.  The form will be faxed to our Forms Department and they will complete it for you.  There is a 25$ fee for all forms that need to be filled out.  Please be aware there is a 10-14 day turnaround time.  You will need to sign a release of information (ZARINA) form if your paperwork does not come with one.  You may call the Forms Department with any questions at [phone].  Their fax number is [phone].
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
## 2024-05-14 NOTE — PROGRESS NOTES
Francesca Cline is covered by your patient's prescription insurance plan  Based on the information provided, your patient can expect to pay:  $75  Patient teaching on Chi [place] performed.  Patient aware of the directions of how to titrate the dose on this medication Left arm;

## 2024-05-16 ENCOUNTER — OFFICE VISIT (OUTPATIENT)
Dept: PHYSICAL MEDICINE AND REHAB | Facility: CLINIC | Age: 54
End: 2024-05-16

## 2024-05-16 VITALS
WEIGHT: 194 LBS | HEIGHT: 66 IN | HEART RATE: 100 BPM | OXYGEN SATURATION: 98 % | RESPIRATION RATE: 18 BRPM | BODY MASS INDEX: 31.18 KG/M2

## 2024-05-16 DIAGNOSIS — M54.12 CERVICAL RADICULOPATHY: Primary | ICD-10-CM

## 2024-05-16 PROCEDURE — 99204 OFFICE O/P NEW MOD 45 MIN: CPT | Performed by: PHYSICAL MEDICINE & REHABILITATION

## 2024-05-16 PROCEDURE — 3008F BODY MASS INDEX DOCD: CPT | Performed by: PHYSICAL MEDICINE & REHABILITATION
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
## 2024-05-21 ENCOUNTER — TELEPHONE (OUTPATIENT)
Dept: PHYSICAL MEDICINE AND REHAB | Facility: CLINIC | Age: 54
End: 2024-05-21

## 2024-05-21 DIAGNOSIS — M54.12 CERVICAL RADICULOPATHY: Primary | ICD-10-CM

## 2024-05-21 NOTE — TELEPHONE ENCOUNTER
Initiated authorization for C7/T1 ILESI with fluoroscopic guidance CPT 11051 dx:M54.12 to be done at [facility] with [facility]  Status: Approved w/ order ID#[id number] valid 5/28/24-6/26/24

## 2024-05-22 NOTE — TELEPHONE ENCOUNTER
Patient has been scheduled for C7-T1 interlaminar epidural steroid injection on 6/4/24 at the [facility] with Dr. Feng.   Anesthesia type:  Local  Please note: The [facility] will call the business day prior to discuss the exact time/arrival and additional instructions for your appointment.  Patient was advised that if he/she does receive the covid vaccine it needs to be at least 2 weeks before or after the injection.  Medications and allergies reviewed.  Educated to hold NSAIDS (Aleve, Ibuprofen, Motrin, Advil) and anti-inflammatories (Meloxicam, Naproxen, Diclofenac, Celebrex) and for cervical injections must hold Multi-Vitamins, Vitamin E, Fish Oil/Omega-3.  If patient is receiving MAC/IVCS, weight loss oral/injectable medications will need to be held for 7 days prior to injection.  Patient informed to fast 8 hours prior to procedure and 10-12 hours prior to procedure with IVCS/MAC if patient is on a weight loss medication.   If on blood thinner, clearance has been received and approved to hold this medication by provider.   Patient informed of [facility]'s  policy:  he/she will need a  to and from procedure and must be on site for their entirety of their visit, if their ride is unable to the procedure will be cancelled.   [facility] is located in the [facility] [address], [place] [address].   may park in the yellow/purple parking lot.  Patient verbalized understanding and agrees with plan.  Scheduled in Epic: Yes  Scheduled in Surgical Case: Yes  Follow up appointment made: NOV: 6/18/2024 Malcolm Feng DO

## 2024-05-30 PROBLEM — T88.4XXA DIFFICULT AIRWAY: Status: ACTIVE | Noted: 2023-04-09

## 2024-05-30 PROBLEM — J96.01 ACUTE RESPIRATORY FAILURE WITH HYPOXIA (HCC): Status: ACTIVE | Noted: 2024-05-30

## 2024-06-18 ENCOUNTER — TELEMEDICINE (OUTPATIENT)
Dept: PHYSICAL MEDICINE AND REHAB | Facility: CLINIC | Age: 54
End: 2024-06-18

## 2024-06-18 DIAGNOSIS — M54.12 CERVICAL RADICULOPATHY: Primary | ICD-10-CM

## 2024-06-18 PROCEDURE — 99214 OFFICE O/P EST MOD 30 MIN: CPT | Performed by: PHYSICAL MEDICINE & REHABILITATION

## 2024-06-18 RX ORDER — PREGABALIN 100 MG/1
100 CAPSULE ORAL 2 TIMES DAILY
Qty: 60 CAPSULE | Refills: 1 | Status: SHIPPED | OUTPATIENT
Start: 2024-06-18

## 2024-06-18 NOTE — PROGRESS NOTES
[facility]    Telemedicine Visit - Follow Up Evaluation    Telehealth Verbal Consent   I conducted a telehealth visit with Asad Chilel today, 06/18/24, which was completed using two-way, real-time interactive audio and video communication.The patient was made aware of the limitations of the telehealth visit, including treatment limitations as no physical exam could be performed.  The patient was advised to call 911 or to go to the ER in case there was an emergency.  The patient was also advised of the potential privacy & security concerns related to the telehealth platform.   The patient was made aware of where to find [facility]'s notice of privacy practices, telehealth consent form and other related consent forms and documents.  which are located on the [facility] website. The patient verbally agreed to telehealth consent form, related consents and the risks discussed.    Lastly, the patient confirmed that they were in [place].   Included in this visit, time may have been spent reviewing labs, medications, radiology tests and decision making. Appropriate medical decision-making and tests are ordered as detailed in the plan of care above.  Coding/billing information is submitted for this visit based on complexity of care and/or time spent for the visit.    Chief Complaint: Axial neck pain, bilateral upper extremity paresthesias and burning    HISTORY OF PRESENT ILLNESS:   The patient is a 53 year old female with axial neck pain and chronic bilateral upper extremity paresthesias and burning.  She follows up after C7-T1 interlaminar epidural steroid injection performed on 6/4/2024, approximately 2 weeks ago.  Unfortunately patient denies any change in her symptoms overall.  No clear diagnostic response to the cervical epidural steroid injection either.  She is discouraged by this.  She continues to experience axial neck pain with bilateral upper extremity radicular pain burning  reaching the hands bilaterally.  MRI reviewed again in full below.    Patient is not currently on any medication, previously she was on gabapentin however this was not helping.  She is never tried Lyrica.    She has participated fully in physical therapy without significant improvement in her symptoms.  She continues to deny any red flag symptoms.    CURRENT MEDICATIONS:     Current Outpatient Medications   Medication Sig Dispense Refill    Multiple Vitamins-Minerals (MULTI-VITAMIN/MINERALS) Oral Tab Take 1 tablet by mouth daily.           ALLERGIES:   No Known Allergies      REVIEW OF SYSTEMS:   A comprehensive 10 point review of systems was completed.  Pertinent positives and negatives noted in the the HPI.      PHYSICAL EXAM:   General: No immediate distress  Head: Normocephalic/ Atraumatic  Eyes: Extra-occular movements intact  Ears/Nose/Throat:  External appearance identifies normal appearance without obvious deformity  Cardiovascular: No cyanosis, clubbing or edema  Respiratory: Non-labored respirations  Skin: No lesions noted   Neurological: alert & oriented x 3, attentive, able to follow commands, comprehention intact, spontaneous speech intact  Psychiatric: Mood and affect appropriate    Musculoskeletal Exam:    Good range of motion of the axial neck with flexion and extension mild exacerbation of neck pain with full rotation of the right and left as well as with extension.  Spurling's maneuver guided on the video visit is negative today.      IMAGING:   MRI cervical spine shows a disc osteophyte at C5-6 with resultant right neural foraminal stenosis, and stable spondy at C5-6.     All imaging results were reviewed and discussed with patient.      ASSESSMENT:     1. Cervical radiculopathy          PLAN:   Continue hme exercises, trial lyrica.   EMG BUE  to see if not from cervical source.     Follow-up: For the above EMG    The patient verbalized understanding with this plan and was in agreement.  There are  no barriers to learning.  All questions were answered.    Duration of the service: 16 minutes        Malcolm Feng DO  Physical Medicine and Rehabilitation  Interventional Spine and Sports Medicine   [facility]

## 2024-06-19 ENCOUNTER — TELEPHONE (OUTPATIENT)
Dept: NEUROLOGY | Facility: CLINIC | Age: 54
End: 2024-06-19

## 2024-06-24 ENCOUNTER — TELEPHONE (OUTPATIENT)
Dept: INTERNAL MEDICINE CLINIC | Facility: CLINIC | Age: 54
End: 2024-06-24

## 2024-06-24 DIAGNOSIS — Z13.228 SCREENING FOR METABOLIC DISORDER: ICD-10-CM

## 2024-06-24 DIAGNOSIS — Z00.00 ROUTINE GENERAL MEDICAL EXAMINATION AT A HEALTH CARE FACILITY: Primary | ICD-10-CM

## 2024-06-24 DIAGNOSIS — E55.9 VITAMIN D DEFICIENCY: ICD-10-CM

## 2024-06-24 DIAGNOSIS — Z13.0 SCREENING FOR BLOOD DISEASE: ICD-10-CM

## 2024-06-24 DIAGNOSIS — Z13.29 SCREENING FOR THYROID DISORDER: ICD-10-CM

## 2024-06-24 DIAGNOSIS — Z13.220 SCREENING FOR LIPID DISORDERS: ICD-10-CM

## 2024-06-24 NOTE — TELEPHONE ENCOUNTER
Patient called request labs prior to their annual physical.  Annual physical scheduled for 7/1/24 Please order labs. Patient preferred lab is [facility]  Patient informed request was sent to clinical team.  Patient informed to fast for labs.  No callback required.

## 2024-06-25 ENCOUNTER — LABORATORY ENCOUNTER (OUTPATIENT)
Dept: LAB | Age: 54
End: 2024-06-25
Attending: INTERNAL MEDICINE

## 2024-06-25 DIAGNOSIS — Z13.220 SCREENING FOR LIPID DISORDERS: ICD-10-CM

## 2024-06-25 DIAGNOSIS — Z13.228 SCREENING FOR METABOLIC DISORDER: ICD-10-CM

## 2024-06-25 DIAGNOSIS — Z13.29 SCREENING FOR THYROID DISORDER: ICD-10-CM

## 2024-06-25 DIAGNOSIS — Z00.00 ROUTINE GENERAL MEDICAL EXAMINATION AT A HEALTH CARE FACILITY: ICD-10-CM

## 2024-06-25 DIAGNOSIS — Z13.0 SCREENING FOR BLOOD DISEASE: ICD-10-CM

## 2024-06-25 DIAGNOSIS — E55.9 VITAMIN D DEFICIENCY: ICD-10-CM

## 2024-06-25 LAB
ALBUMIN SERPL-MCNC: 3.6 G/DL (ref 3.4–5)
ALBUMIN/GLOB SERPL: 1 {RATIO} (ref 1–2)
ALP LIVER SERPL-CCNC: 114 U/L
ALT SERPL-CCNC: 54 U/L
ANION GAP SERPL CALC-SCNC: 9 MMOL/L (ref 0–18)
AST SERPL-CCNC: 28 U/L (ref 15–37)
BASOPHILS # BLD AUTO: 0.05 X10(3) UL (ref 0–0.2)
BASOPHILS NFR BLD AUTO: 0.8 %
BILIRUB SERPL-MCNC: 0.5 MG/DL (ref 0.1–2)
BUN BLD-MCNC: 20 MG/DL (ref 9–23)
CALCIUM BLD-MCNC: 9.3 MG/DL (ref 8.5–10.1)
CHLORIDE SERPL-SCNC: 107 MMOL/L (ref 98–112)
CHOLEST SERPL-MCNC: 265 MG/DL (ref ?–200)
CO2 SERPL-SCNC: 24 MMOL/L (ref 21–32)
CREAT BLD-MCNC: 0.8 MG/DL
EGFRCR SERPLBLD CKD-EPI 2021: 88 ML/MIN/1.73M2 (ref 60–?)
EOSINOPHIL # BLD AUTO: 0.22 X10(3) UL (ref 0–0.7)
EOSINOPHIL NFR BLD AUTO: 3.5 %
ERYTHROCYTE [DISTWIDTH] IN BLOOD BY AUTOMATED COUNT: 12.1 %
FASTING PATIENT LIPID ANSWER: YES
FASTING STATUS PATIENT QL REPORTED: YES
GLOBULIN PLAS-MCNC: 3.7 G/DL (ref 2.8–4.4)
GLUCOSE BLD-MCNC: 93 MG/DL (ref 70–99)
HCT VFR BLD AUTO: 42.7 %
HDLC SERPL-MCNC: 82 MG/DL (ref 40–59)
HGB BLD-MCNC: 14 G/DL
IMM GRANULOCYTES # BLD AUTO: 0.01 X10(3) UL (ref 0–1)
IMM GRANULOCYTES NFR BLD: 0.2 %
LDLC SERPL CALC-MCNC: 156 MG/DL (ref ?–100)
LYMPHOCYTES # BLD AUTO: 2.28 X10(3) UL (ref 1–4)
LYMPHOCYTES NFR BLD AUTO: 36.7 %
MCH RBC QN AUTO: 31.3 PG (ref 26–34)
MCHC RBC AUTO-ENTMCNC: 32.8 G/DL (ref 31–37)
MCV RBC AUTO: 95.5 FL
MONOCYTES # BLD AUTO: 0.4 X10(3) UL (ref 0.1–1)
MONOCYTES NFR BLD AUTO: 6.4 %
NEUTROPHILS # BLD AUTO: 3.26 X10 (3) UL (ref 1.5–7.7)
NEUTROPHILS # BLD AUTO: 3.26 X10(3) UL (ref 1.5–7.7)
NEUTROPHILS NFR BLD AUTO: 52.4 %
NONHDLC SERPL-MCNC: 183 MG/DL (ref ?–130)
OSMOLALITY SERPL CALC.SUM OF ELEC: 292 MOSM/KG (ref 275–295)
PLATELET # BLD AUTO: 283 10(3)UL (ref 150–450)
POTASSIUM SERPL-SCNC: 4.4 MMOL/L (ref 3.5–5.1)
PROT SERPL-MCNC: 7.3 G/DL (ref 6.4–8.2)
RBC # BLD AUTO: 4.47 X10(6)UL
SODIUM SERPL-SCNC: 140 MMOL/L (ref 136–145)
TRIGL SERPL-MCNC: 154 MG/DL (ref 30–149)
TSI SER-ACNC: 2.36 MIU/ML (ref 0.36–3.74)
VIT D+METAB SERPL-MCNC: 31.1 NG/ML (ref 30–100)
VLDLC SERPL CALC-MCNC: 30 MG/DL (ref 0–30)
WBC # BLD AUTO: 6.2 X10(3) UL (ref 4–11)

## 2024-06-25 PROCEDURE — 80050 GENERAL HEALTH PANEL: CPT | Performed by: INTERNAL MEDICINE

## 2024-06-25 PROCEDURE — 82306 VITAMIN D 25 HYDROXY: CPT | Performed by: INTERNAL MEDICINE

## 2024-06-25 PROCEDURE — 80061 LIPID PANEL: CPT | Performed by: INTERNAL MEDICINE

## 2024-06-26 PROBLEM — Z87.09 HISTORY OF ACUTE RESPIRATORY FAILURE: Status: ACTIVE | Noted: 2023-01-01

## 2024-06-26 PROBLEM — J96.01 ACUTE RESPIRATORY FAILURE WITH HYPOXIA (HCC): Status: RESOLVED | Noted: 2024-05-30 | Resolved: 2024-06-26
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
## 2024-07-01 ENCOUNTER — OFFICE VISIT (OUTPATIENT)
Dept: INTERNAL MEDICINE CLINIC | Facility: CLINIC | Age: 54
End: 2024-07-01
Payer: COMMERCIAL

## 2024-07-01 VITALS
HEIGHT: 66 IN | BODY MASS INDEX: 31 KG/M2 | SYSTOLIC BLOOD PRESSURE: 132 MMHG | DIASTOLIC BLOOD PRESSURE: 84 MMHG | TEMPERATURE: 98 F | OXYGEN SATURATION: 99 % | RESPIRATION RATE: 18 BRPM | HEART RATE: 103 BPM

## 2024-07-01 DIAGNOSIS — Z00.00 ROUTINE GENERAL MEDICAL EXAMINATION AT A HEALTH CARE FACILITY: Primary | ICD-10-CM

## 2024-07-01 DIAGNOSIS — M54.12 CERVICAL RADICULOPATHY: ICD-10-CM

## 2024-07-01 DIAGNOSIS — D18.03 HEPATIC HEMANGIOMA: ICD-10-CM

## 2024-07-01 DIAGNOSIS — R79.89 ELEVATED LFTS: ICD-10-CM

## 2024-07-01 DIAGNOSIS — Z87.828 HISTORY OF MOTOR VEHICLE ACCIDENT: ICD-10-CM

## 2024-07-01 DIAGNOSIS — E78.5 DYSLIPIDEMIA: ICD-10-CM

## 2024-07-01 DIAGNOSIS — Z87.81 HISTORY OF RIB FRACTURE: ICD-10-CM

## 2024-07-01 DIAGNOSIS — E66.9 OBESITY (BMI 30.0-34.9): ICD-10-CM

## 2024-07-01 PROBLEM — D64.9 ANEMIA: Status: RESOLVED | Noted: 2022-03-24 | Resolved: 2024-07-01

## 2024-07-01 PROBLEM — K59.00 CONSTIPATION: Status: RESOLVED | Noted: 2019-01-17 | Resolved: 2024-07-01

## 2024-07-02 ENCOUNTER — TELEPHONE (OUTPATIENT)
Dept: INTERNAL MEDICINE CLINIC | Facility: CLINIC | Age: 54
End: 2024-07-02

## 2024-07-02 NOTE — TELEPHONE ENCOUNTER
We received record request from [employer]-sent to scanning and [facility]

## 2024-07-10 ENCOUNTER — TELEPHONE (OUTPATIENT)
Dept: SURGERY | Facility: CLINIC | Age: 54
End: 2024-07-10

## 2024-07-10 NOTE — TELEPHONE ENCOUNTER
Rec'd DOS 6/27/2024 request for any and all medical records from 5/20/23 to present. Sent to [employer].

## 2024-07-22 ENCOUNTER — PROCEDURE VISIT (OUTPATIENT)
Dept: PHYSICAL MEDICINE AND REHAB | Facility: CLINIC | Age: 54
End: 2024-07-22
Payer: COMMERCIAL

## 2024-07-22 DIAGNOSIS — M54.12 CERVICAL RADICULOPATHY: ICD-10-CM

## 2024-07-22 PROCEDURE — 95911 NRV CNDJ TEST 9-10 STUDIES: CPT | Performed by: PHYSICAL MEDICINE & REHABILITATION

## 2024-07-22 PROCEDURE — 95886 MUSC TEST DONE W/N TEST COMP: CPT | Performed by: PHYSICAL MEDICINE & REHABILITATION

## 2024-07-22 RX ORDER — DULOXETIN HYDROCHLORIDE 20 MG/1
20 CAPSULE, DELAYED RELEASE ORAL DAILY
Qty: 30 CAPSULE | Refills: 0 | Status: SHIPPED | OUTPATIENT
Start: 2024-07-22

## 2024-07-23 ENCOUNTER — HOSPITAL ENCOUNTER (OUTPATIENT)
Dept: MAMMOGRAPHY | Age: 54
Discharge: HOME | End: 2024-07-23
Attending: NURSE PRACTITIONER
Payer: COMMERCIAL

## 2024-07-23 DIAGNOSIS — Z12.31 ENCOUNTER FOR SCREENING MAMMOGRAM FOR MALIGNANT NEOPLASM OF BREAST: ICD-10-CM

## 2024-07-23 PROCEDURE — 77067 SCR MAMMO BI INCL CAD: CPT | Performed by: NURSE PRACTITIONER

## 2024-07-23 PROCEDURE — 77063 BREAST TOMOSYNTHESIS BI: CPT | Performed by: NURSE PRACTITIONER

## 2024-07-23 NOTE — PROCEDURES
[address]  [address]  [place] [address]  Phone: [phone]  Fax: [phone]    ELECTRODIAGNOSTIC REPORT          Patient: Ranjana Kamara Hand Dominance:  Right   Patient ID: [id number] Referring Dr:  Dr. Malcolm Feng   Sex: Female Test Dr:  Dr. Feng   YOB: 1970           Visit Date: 53 Years 11 Months Examining MD:     Age: 53 Years 11 Months Referred by:     Height: 5 feet 6 inch     Referred for:    Bilateral forearm burning             Summary    The motor conduction test was normal in all 4 of the tested nerves: R Median - APB, L Median - APB, R Ulnar - ADM, L Ulnar - ADM.    The sensory conduction test was normal in all 6 of the tested nerves: R Radial - Thumb, L Radial - Thumb, R Median - Digit III (Antidromic), L Median - Digit III (Antidromic), R Ulnar - Digit V (Antidromic), L Ulnar - Digit V (Antidromic).    The needle EMG examination was performed in 12 muscles. It was normal in 11 muscle(s): L. Deltoid, R. Deltoid, R. Biceps brachii, L. Triceps brachii, R. Triceps brachii, L. Pronator teres, R. Pronator teres, L. First dorsal interosseous, R. First dorsal interosseous, L. Abductor pollicis brevis, R. Abductor pollicis brevis. The study was abnormal in 1 muscle(s), with the following distribution:  The L. Biceps brachii had abnormal MUP waveforms.          Conclusion:       This is a largely normal study.    1.  There is some polyphasicity of the motor unit action potentials in the left biceps however this is of unclear clinical significance as it was not corroborated on any other muscle during the needle EMG study.  2.  There is no evidence to support a right or left-sided ulnar or median mononeuropathy.  3.  There is no evidence to support a left or right-sided cervical (C5-T1) radiculopathy.        Malcolm Feng DO  Interventional Spine and Sports Medicine Specialist   Physical Medicine and Rehabilitation  [facility]  [address] [address]    [facility]  [address] [place] [address]        ________________________________               Motor NCS      Nerve / Sites Muscle Latency Amplitude Amp % Duration Segments Distance Lat Diff Velocity     ms mV % ms  cm ms m/s   R Median - APB      Wrist APB 4.01 6.8 101 6.61 Wrist - APB 8        Ref.  ?4.50 ?4.1 ?80  Ref.   ?49      Elbow APB 7.45 6.8 100 6.61 Elbow - Wrist 20 3.44 58      Ref.      Ref.   ?49   L Median - APB      Wrist APB 2.81 8.5 102 6.35 Wrist - APB 8        Ref.  ?4.50 ?4.1 ?80  Ref.   ?49      Elbow APB 6.30 8.3 100 6.41 Elbow - Wrist 20 3.49 57      Ref.      Ref.   ?49   R Ulnar - ADM      Wrist ADM 2.81 11.0 100 4.95 Wrist - ADM 8        Ref.  ?3.70 ?8.0 ?80  Ref.   ?49      B.Elbow ADM 5.94 10.9 99.6 5.26 B.Elbow - Wrist 21 3.12 67      Ref.      Ref.   ?49      A.Elbow ADM 7.50 10.4 95 5.42 A.Elbow - B.Elbow 9.5 1.56 61      Ref.      Ref.   ?49   L Ulnar - ADM      Wrist ADM 2.71 12.7 100 5.00 Wrist - ADM 8        Ref.  ?3.70 ?8.0 ?80  Ref.   ?49      B.Elbow ADM 5.94 12.4 97.5 5.16 B.Elbow - Wrist 20 3.23 62      Ref.      Ref.   ?49      A.Elbow ADM 7.14 11.2 88 5.47 A.Elbow - B.Elbow 8 1.20 67      Ref.      Ref.   ?49       Sensory NCS      Nerve / Sites Rec. Site Onset Lat Peak Lat NP Amp PP Amp Segments Distance Velocity     ms ms µV µV  cm m/s   R Radial - Thumb      Forearm Wrist 1.56 2.14 21.2 20.0 Forearm - Wrist 10 64      Ref.   ?2.40 ?3.0  Ref.     L Radial - Thumb      Forearm Wrist 1.41 1.98 26.9 22.3 Forearm - Wrist        Ref.   ?2.40 ?3.0  Ref.     R Median - Digit III (Antidromic)      Wrist Dig III 2.50 3.23 26.4 36.5 Wrist - Dig III 14 56      Ref.   ?3.40 ?10.0  Ref.     L Median - Digit III (Antidromic)      Wrist Dig III 2.34 3.07 37.3 38.6 Wrist - Dig III 14 60      Ref.   ?3.40 ?10.0  Ref.     R Ulnar - Digit V (Antidromic)      Wrist Dig V 2.34 3.02 22.4 41.5 Wrist - Dig  V 14 60      Ref.   ?3.10 ?6.0  Ref.     L Ulnar - Digit V (Antidromic)      Wrist Dig V 2.45 3.07 25.5 46.0 Wrist - Dig V 14 57      Ref.   ?3.10 ?6.0  Ref.         EMG Summary Table     Spontaneous MUAP Recruitment   Muscle Nerve Roots IA Fib PSW Fasc H.F. Comments Amp Dur. PPP Pattern   L. Deltoid Axillary C5-C6 N None None None None Normal N N N N   R. Deltoid Axillary C5-C6 N None None None None Normal N N N N   L. Biceps brachii Musculocutaneous C5-C6 N None None None None Normal N N 1+ N   R. Biceps brachii Musculocutaneous C5-C6 N None None None None Normal N N N N   L. Triceps brachii Radial C6-C8 N None None None None Normal N N N N   R. Triceps brachii Radial C6-C8 N None None None None Normal N N N N   L. Pronator teres Median C6-C7 N None None None None Normal N N N N   R. Pronator teres Median C6-C7 N None None None None Normal N N N N   L. First dorsal interosseous Ulnar C8-T1 N None None None None Normal N N N N   R. First dorsal interosseous Ulnar C8-T1 N None None None None Normal N N N N   L. Abductor pollicis brevis Median C8-T1 N None None None None Normal N N N N   R. Abductor pollicis brevis Median C8-T1 N None None None None Normal N N N N

## 2024-07-25 ENCOUNTER — TELEPHONE (OUTPATIENT)
Dept: INTERNAL MEDICINE CLINIC | Facility: CLINIC | Age: 54
End: 2024-07-25

## 2024-07-31 ENCOUNTER — TELEPHONE (OUTPATIENT)
Dept: NEUROLOGY | Facility: CLINIC | Age: 54
End: 2024-07-31

## 2024-07-31 NOTE — TELEPHONE ENCOUNTER
Disability forms received in the forms department.    Valid auth received ([facility]).    Logged for processing.

## 2024-08-02 ENCOUNTER — LABORATORY ENCOUNTER (OUTPATIENT)
Dept: LAB | Age: 54
End: 2024-08-02
Attending: INTERNAL MEDICINE
Payer: COMMERCIAL

## 2024-08-02 DIAGNOSIS — B18.1 CHRONIC HEPATITIS B (HCC): Primary | ICD-10-CM

## 2024-08-02 DIAGNOSIS — K76.9 LIVER LESION: ICD-10-CM

## 2024-08-02 DIAGNOSIS — K76.0 NAFLD (NONALCOHOLIC FATTY LIVER DISEASE): ICD-10-CM

## 2024-08-02 LAB
ALBUMIN SERPL-MCNC: 4.3 G/DL (ref 3.2–4.8)
ALBUMIN/GLOB SERPL: 1.5 {RATIO} (ref 1–2)
ALP LIVER SERPL-CCNC: 97 U/L
ALT SERPL-CCNC: 30 U/L
ANION GAP SERPL CALC-SCNC: 6 MMOL/L (ref 0–18)
AST SERPL-CCNC: 26 U/L (ref ?–34)
BASOPHILS # BLD AUTO: 0.04 X10(3) UL (ref 0–0.2)
BASOPHILS NFR BLD AUTO: 0.6 %
BILIRUB SERPL-MCNC: 0.6 MG/DL (ref 0.3–1.2)
BUN BLD-MCNC: 16 MG/DL (ref 9–23)
CALCIUM BLD-MCNC: 9.7 MG/DL (ref 8.7–10.4)
CHLORIDE SERPL-SCNC: 108 MMOL/L (ref 98–112)
CO2 SERPL-SCNC: 26 MMOL/L (ref 21–32)
CREAT BLD-MCNC: 0.69 MG/DL
EGFRCR SERPLBLD CKD-EPI 2021: 104 ML/MIN/1.73M2 (ref 60–?)
EOSINOPHIL # BLD AUTO: 0.28 X10(3) UL (ref 0–0.7)
EOSINOPHIL NFR BLD AUTO: 4.5 %
ERYTHROCYTE [DISTWIDTH] IN BLOOD BY AUTOMATED COUNT: 11.9 %
FASTING STATUS PATIENT QL REPORTED: YES
GLOBULIN PLAS-MCNC: 2.9 G/DL (ref 2–3.5)
GLUCOSE BLD-MCNC: 92 MG/DL (ref 70–99)
HCT VFR BLD AUTO: 41.5 %
HGB BLD-MCNC: 14 G/DL
IMM GRANULOCYTES # BLD AUTO: 0.01 X10(3) UL (ref 0–1)
IMM GRANULOCYTES NFR BLD: 0.2 %
LYMPHOCYTES # BLD AUTO: 2.39 X10(3) UL (ref 1–4)
LYMPHOCYTES NFR BLD AUTO: 38.2 %
MCH RBC QN AUTO: 31.5 PG (ref 26–34)
MCHC RBC AUTO-ENTMCNC: 33.7 G/DL (ref 31–37)
MCV RBC AUTO: 93.3 FL
MONOCYTES # BLD AUTO: 0.43 X10(3) UL (ref 0.1–1)
MONOCYTES NFR BLD AUTO: 6.9 %
NEUTROPHILS # BLD AUTO: 3.1 X10 (3) UL (ref 1.5–7.7)
NEUTROPHILS # BLD AUTO: 3.1 X10(3) UL (ref 1.5–7.7)
NEUTROPHILS NFR BLD AUTO: 49.6 %
OSMOLALITY SERPL CALC.SUM OF ELEC: 291 MOSM/KG (ref 275–295)
PLATELET # BLD AUTO: 266 10(3)UL (ref 150–450)
POTASSIUM SERPL-SCNC: 4.4 MMOL/L (ref 3.5–5.1)
PROT SERPL-MCNC: 7.2 G/DL (ref 5.7–8.2)
RBC # BLD AUTO: 4.45 X10(6)UL
SODIUM SERPL-SCNC: 140 MMOL/L (ref 136–145)
WBC # BLD AUTO: 6.3 X10(3) UL (ref 4–11)

## 2024-08-02 PROCEDURE — 80053 COMPREHEN METABOLIC PANEL: CPT

## 2024-08-02 PROCEDURE — 85025 COMPLETE CBC W/AUTO DIFF WBC: CPT

## 2024-08-02 PROCEDURE — 36415 COLL VENOUS BLD VENIPUNCTURE: CPT

## 2024-08-04 ENCOUNTER — HOSPITAL ENCOUNTER (OUTPATIENT)
Dept: CT IMAGING | Age: 54
Discharge: HOME | End: 2024-08-04
Attending: NURSE PRACTITIONER

## 2024-08-04 DIAGNOSIS — Z13.6 SCREENING FOR CARDIOVASCULAR CONDITION: ICD-10-CM
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
## 2024-08-12 NOTE — TELEPHONE ENCOUNTER
Please try to avoid signing forms in the corner as it is not visible when printing and forms are not accepted this way. Thank you!    Paloma,    **The ACKNOWLEDGE button has been moved to the top right ribbon**    Please sign off on form if you agree to: Disability  (place your signature on the first page only)  -From your Inbasket, Highlight the patient and click Chart  -Double click the 7/25/24 Forms Completion telephone encounter  -Scroll down to the Media section  -Click the blue Hyperlink: Disability [place]ante 8/12/24    -Click Acknowledge located in the top right ribbon/menu  -Drag the mouse into the blank space of the document and a + sign will appear. Left click to  electronically sign the document.    Thank you,    Carie

## 2024-08-12 NOTE — TELEPHONE ENCOUNTER
Completed   if more information if required for her employer, she may need to speak to Dr Feng.

## 2024-08-14 NOTE — TELEPHONE ENCOUNTER
Form signed and efaxed to [employer] per attached authorization [id number]. Also sent on [employer]

## 2024-09-11 NOTE — TELEPHONE ENCOUNTER
Spoke to patient, she stated that [employer] never received her completed forms.    Forms re-faxed to [employer] at # [phone].    Awaiting fax confirmation.

## 2024-09-26 ENCOUNTER — TELEPHONE (OUTPATIENT)
Dept: SURGERY | Facility: CLINIC | Age: 54
End: 2024-09-26

## 2024-10-28 ENCOUNTER — ORDER TRANSCRIPTION (OUTPATIENT)
Dept: PHYSICAL THERAPY | Facility: HOSPITAL | Age: 54
End: 2024-10-28

## 2024-10-28 DIAGNOSIS — M54.16 LUMBAR RADICULOPATHY: Primary | ICD-10-CM

## 2024-11-11 ENCOUNTER — OFFICE VISIT (OUTPATIENT)
Dept: PHYSICAL THERAPY | Age: 54
End: 2024-11-11
Attending: NEUROLOGICAL SURGERY
Payer: COMMERCIAL

## 2024-11-11 ENCOUNTER — TELEPHONE (OUTPATIENT)
Dept: PHYSICAL THERAPY | Facility: HOSPITAL | Age: 54
End: 2024-11-11

## 2024-11-11 DIAGNOSIS — M54.16 LUMBAR RADICULOPATHY: Primary | ICD-10-CM

## 2024-11-11 PROCEDURE — 97161 PT EVAL LOW COMPLEX 20 MIN: CPT | Performed by: PHYSICAL THERAPIST

## 2024-11-11 PROCEDURE — 97110 THERAPEUTIC EXERCISES: CPT | Performed by: PHYSICAL THERAPIST
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
## 2024-11-13 ENCOUNTER — OFFICE VISIT (OUTPATIENT)
Dept: PHYSICAL THERAPY | Age: 54
End: 2024-11-13
Attending: NEUROLOGICAL SURGERY
Payer: COMMERCIAL

## 2024-11-13 PROCEDURE — 97110 THERAPEUTIC EXERCISES: CPT | Performed by: PHYSICAL THERAPIST

## 2024-11-13 NOTE — PROGRESS NOTES
Dx: Lumbar radiculopathy (M54.16) , s/p cervical fusion            Authorized # of Visits:  20  Fall Risk: standard         Precautions: n/a             Subjective:   Burning in both arms, neck and back pain   Current Pain Ratin/10  Objective:   Presented with bilateral UE symptoms right > left. Postural correction decreased symptoms  Leg load initially - symptoms with bracing prior to movement - minimal lumbar pain   Cervical flexion provoked increased right UE symptoms - depression UT/first rib with towel - diminished bilat UE symptoms. Patient to utilize as intervention at home for reducing UE symptoms   Position changes sit to and from supine and rolling provoke pain     Assessment:   Patient responded well to interventions. Difficulty with position changes was present prior to surgery and was still notable today.  Closed chain exercises were tolerated well.      Plan:   PT 2x/wk progressive exercise, body mechanics training     Date: 2024  Tx#:  Date:   Tx#: 3/ Date:   Tx#: 4/ Date:   Tx#: 5/ Date:   Tx#: 6/ Date:   Tx#: 7/ Date:   Tx#: 8/   TherEx 50 min TherEx TherEx TherEx TherEx TherEx TherEx   Treadmill 8 minutes with UE support secondary to decreased balance          TRX bilat shoulder flexion          TRX squats 10 reps x 2          Forward step same side bilat UE , closed chain hip IR bilat UE  10 reps each     Rhythmic stab pelvis/shoulders contact points, pelvis, shoulders              SL thoracic mobility 10 reps each, scap mobility bilt PROM         Bent knee fallouts - good, leg load - symptoms - lumbar pain provoked - pain reduced with bracing     Post pelvic tilt - pain lumbar spine          Knees to chest with SB 20 reps          PROM bilat LE in supine - no symptoms - ROM WNL                       Charges: TherEx 3        Total Timed Treatment: 50 min  Total Treatment Time: 50 min

## 2024-11-18 ENCOUNTER — OFFICE VISIT (OUTPATIENT)
Dept: PHYSICAL THERAPY | Age: 54
End: 2024-11-18
Attending: NEUROLOGICAL SURGERY
Payer: COMMERCIAL

## 2024-11-18 PROCEDURE — 97110 THERAPEUTIC EXERCISES: CPT | Performed by: PHYSICAL THERAPIST

## 2024-11-18 PROCEDURE — 97140 MANUAL THERAPY 1/> REGIONS: CPT | Performed by: PHYSICAL THERAPIST

## 2024-11-18 NOTE — PROGRESS NOTES
Dx: Lumbar radiculopathy (M54.16) , s/p cervical fusion            Authorized # of Visits:  20  Fall Risk: standard         Precautions: n/a             Subjective:   Back and neck pain. She had increased pain with walking yesterday.  The burning in her arms did not increase   Current Pain Ratin/10  Objective:   Pain reduced with manual depression right UT in standing. In supine, additional reduction with bilat depression   Increased pain with UE neural tension. LE - negative  Position changes continue to provoke increased pain       Assessment:   Treatment session focused on passive UE/LE ROM with good reduction in pain. There is tendency of patient to elevate shoulders, she is able to correct with tactile and verbal  cues. Tolerance for UE ROM improved following scap depression     Plan:   PT 2x/wk progressive exercise, body mechanics training     Date: 2024  Tx#:  Date: 2024   Tx#: 3/20 Date:   Tx#: 4/ Date:   Tx#: 5/ Date:   Tx#: 6/ Date:   Tx#: 7/ Date:   Tx#: 8/   TherEx 50 min TherEx 30 min TherEx TherEx TherEx TherEx TherEx   Treadmill 8 minutes with UE support secondary to decreased balance  Elliptical 5 minutes         TRX bilat shoulder flexion  SL thoracic mobility UE  10 reps each         TRX squats 10 reps x 2  Bent knee fallouts, leg load 15 reps each         Forward step same side bilat UE , closed chain hip IR bilat UE  10 reps each     Rhythmic stab pelvis/shoulders contact points, pelvis, shoulders      Posterior pelvic tilt 15 reps        SL thoracic mobility 10 reps each, scap mobility bilt PROM LTR 10 reps     Shoulder retraction green band > 15 reps,   Extension 10 reps     Rhythmic stabilization contact points pelvis/shoulders         Bent knee fallouts - good, leg load - symptoms - lumbar pain provoked - pain reduced with bracing     Post pelvic tilt - pain lumbar spine  Manual therapy 15 min:   Bilat scap depression in supine, SL passive mobility scap,   T/L PAS,   Supine passive UE ROM         Knees to chest with SB 20 reps          PROM bilat LE in supine - no symptoms - ROM WNL                       Charges: TherEx 2, manual therapy         Total Timed Treatment: 50 min  Total Treatment Time: 50 min      Alert and interactive, no focal deficits

## 2024-11-20 ENCOUNTER — OFFICE VISIT (OUTPATIENT)
Dept: PHYSICAL THERAPY | Age: 54
End: 2024-11-20
Attending: NEUROLOGICAL SURGERY
Payer: COMMERCIAL

## 2024-11-20 PROCEDURE — 97110 THERAPEUTIC EXERCISES: CPT | Performed by: PHYSICAL THERAPIST

## 2024-11-20 NOTE — PROGRESS NOTES
Dx: Lumbar radiculopathy (M54.16) , s/p cervical fusion            Authorized # of Visits:  20  Fall Risk: standard         Precautions: n/a             Subjective:   Pain right side of back, neck pain both sides  Current Pain Ratin-6/10  Objective:   Patient presented with slight forward flexion - increased neck pain, with tactile cues to improve posture - pain reduced   Quadruped rocking - no pain with wide knee position   Leg load/bent knee fallouts - good    Position changes - neck pain   Standing bilat shoulder flexion - painful, no pain performed one arm with forward step        Assessment:   Good pain reduction with exercise and including mobility as well as stability based exercises. Position changes remain difficult. Next visit will include emphasis on rolling and position changes sitting to and from supine   Plan:   PT 2x/wk progressive exercise, body mechanics training     Date: 2024  Tx#:  Date: 2024   Tx#: 3/20 Date: 2024   Tx#:  Date:   Tx#: 5/ Date:   Tx#: 6/ Date:   Tx#: 7/ Date:   Tx#: 8/   TherEx 50 min TherEx 30 min TherEx 45 min TherEx TherEx TherEx TherEx   Treadmill 8 minutes with UE support secondary to decreased balance  Elliptical 5 minutes  Elliptical 6 minutes        TRX bilat shoulder flexion  SL thoracic mobility UE  10 reps each  Quadruped rocking - no pain with wide knee position        TRX squats 10 reps x 2  Bent knee fallouts, leg load 15 reps each  Prone MT 20 reps        Forward step same side bilat UE , closed chain hip IR bilat UE  10 reps each     Rhythmic stab pelvis/shoulders contact points, pelvis, shoulders      Posterior pelvic tilt 15 reps Prone shoulder exten bilat 10 reps x 2        SL thoracic mobility 10 reps each, scap mobility bilt PROM LTR 10 reps     Shoulder retraction green band > 15 reps,   Extension 10 reps     Rhythmic stabilization contact points pelvis/shoulders  Leg load 15 reps each, bent knee fallouts 15  reps each,     LTR pain - slight decrease following transverse plane isometrics        Bent knee fallouts - good, leg load - symptoms - lumbar pain provoked - pain reduced with bracing     Post pelvic tilt - pain lumbar spine  Manual therapy 15 min:   Bilat scap depression in supine, SL passive mobility scap,  T/L PAS,   Supine passive UE ROM  TRX squats 20 reps        Knees to chest with SB 20 reps   Cervical isometrics 5 reps each direction        PROM bilat LE in supine - no symptoms - ROM WNL   Seated thoracic rotation with isometric hip adduction UE , cervcial rotation 10 reps each          TRX squats 20 reps         Forward step same side bilat reach shoulder height, forward step with same and opposite overhead reach - no pain, bilat overhead reach - pain            Charges: TherEx 3       Total Timed Treatment: 45 min  Total Treatment Time: 45 min

## 2024-11-25 ENCOUNTER — OFFICE VISIT (OUTPATIENT)
Dept: PHYSICAL THERAPY | Age: 54
End: 2024-11-25
Attending: NEUROLOGICAL SURGERY
Payer: COMMERCIAL

## 2024-11-25 PROCEDURE — 97110 THERAPEUTIC EXERCISES: CPT | Performed by: PHYSICAL THERAPIST

## 2024-11-25 NOTE — PROGRESS NOTES
Dx: Lumbar radiculopathy (M54.16) , s/p cervical fusion            Authorized # of Visits:  20  Fall Risk: standard         Precautions: n/a             Subjective:   Burning in her arms   Current Pain Ratin/10  Objective:   Bilat UE/LE ROM WNL. Mild increase in right UE symptoms with wrist extension combined with full elbow extension, shoulder at 90 abduction       Assessment:   Position changes made with greater ease and decreased pain following exercise. Guarding is decreased   Plan:   PT 2x/wk progressive exercise, body mechanics training     Date: 2024  Tx#:  Date: 2024   Tx#: 3/20 Date: 2024   Tx#:  Date: 2024   Tx#:  Date:   Tx#: 6/ Date:   Tx#: 7/ Date:   Tx#: 8/   TherEx 50 min TherEx 30 min TherEx 45 min TherEx 45 min TherEx TherEx TherEx   Treadmill 8 minutes with UE support secondary to decreased balance  Elliptical 5 minutes  Elliptical 6 minutes  Elliptical 8 minutes      TRX bilat shoulder flexion  SL thoracic mobility UE  10 reps each  Quadruped rocking - no pain with wide knee position  Dynamic warm up UE  transverse, frontal and sagittal plane       TRX squats 10 reps x 2  Bent knee fallouts, leg load 15 reps each  Prone MT 20 reps  TRX squats 20 reps       Forward step same side bilat UE , closed chain hip IR bilat UE  10 reps each     Rhythmic stab pelvis/shoulders contact points, pelvis, shoulders      Posterior pelvic tilt 15 reps Prone shoulder exten bilat 10 reps x 2  Rolling supine to SL 8 reps each       SL thoracic mobility 10 reps each, scap mobility bilt PROM LTR 10 reps     Shoulder retraction green band > 15 reps,   Extension 10 reps     Rhythmic stabilization contact points pelvis/shoulders  Leg load 15 reps each, bent knee fallouts 15 reps each,     LTR pain - slight decrease following transverse plane isometrics  Knees to chest with SB 20 reps       Bent knee fallouts - good, leg load - symptoms - lumbar pain provoked -  pain reduced with bracing     Post pelvic tilt - pain lumbar spine  Manual therapy 15 min:   Bilat scap depression in supine, SL passive mobility scap,  T/L PAS,   Supine passive UE ROM  TRX squats 20 reps  Supine cervical PROM  in supine, right and left UE ROM/nerve flossing        Knees to chest with SB 20 reps   Cervical isometrics 5 reps each direction  The \"stick\" bilat IT/hamstrings/ quads soft tissue mob       PROM bilat LE in supine - no symptoms - ROM WNL   Seated thoracic rotation with isometric hip adduction UE , cervcial rotation 10 reps each  Figure four stretch 3 reps x 30 seconds each          TRX squats 20 reps Leg load with blue band pulldown 10 reps x 2 each        Forward step same side bilat reach shoulder height, forward step with same and opposite overhead reach - no pain, bilat overhead reach - pain            Charges: TherEx 3       Total Timed Treatment: 45 min  Total Treatment Time: 45 min

## 2024-11-26 ENCOUNTER — APPOINTMENT (OUTPATIENT)
Dept: PHYSICAL THERAPY | Age: 54
End: 2024-11-26
Attending: NEUROLOGICAL SURGERY
Payer: COMMERCIAL

## 2024-11-27 ENCOUNTER — OFFICE VISIT (OUTPATIENT)
Dept: PHYSICAL THERAPY | Age: 54
End: 2024-11-27
Attending: NEUROLOGICAL SURGERY
Payer: COMMERCIAL

## 2024-11-27 PROCEDURE — 97110 THERAPEUTIC EXERCISES: CPT | Performed by: PHYSICAL THERAPIST

## 2024-11-27 NOTE — PROGRESS NOTES
Dx: Lumbar radiculopathy (M54.16) , s/p cervical fusion            Authorized # of Visits:  20  Fall Risk: standard         Precautions: n/a             Subjective:   Mostly LBP.    Current Pain Ratin/10  Objective:   Bilateral UE symptoms with 90/90 position. Following contract/relax techniques - increased ROM with decreased UE symptoms  Lumbar pain provoked with forward bending, no pain with hip hinge.   Posterior pelvic tilting reduced pain. Supine left hip flexion - lumbar pain. Flexion combined with shoulder flexion - no pain . HEP additions as noted     Assessment:   Pain reduced following interventions. Improved reaction/timing of spinal stabilizers will improve lumbar pain. UE radicular symptoms are decreasing    Plan:   PT 2x/wk progressive exercise, body mechanics training     Date: 2024  Tx#:  Date: 2024   Tx#: 3/20 Date: 2024   Tx#:  Date: 2024   Tx#:  Date: 2024   Tx#:   Date:   Tx#: 7 Date:   Tx#: 8/   TherEx 50 min TherEx 30 min TherEx 45 min TherEx 45 min TherEx 50 min TherEx TherEx   Treadmill 8 minutes with UE support secondary to decreased balance  Elliptical 5 minutes  Elliptical 6 minutes  Elliptical 8 minutes Elliptical 10 minutes     TRX bilat shoulder flexion  SL thoracic mobility UE  10 reps each  Quadruped rocking - no pain with wide knee position  Dynamic warm up UE  transverse, frontal and sagittal plane  TRX bilat shoulder flexion 10 reps     Doorway pec ER stretch      TRX squats 10 reps x 2  Bent knee fallouts, leg load 15 reps each  Prone MT 20 reps  TRX squats 20 reps  Posterior pelvic tilt reduces lumbar pain (HEP)     Forward step same side bilat UE , closed chain hip IR bilat UE  10 reps each     Rhythmic stab pelvis/shoulders contact points, pelvis, shoulders      Posterior pelvic tilt 15 reps Prone shoulder exten bilat 10 reps x 2  Rolling supine to SL 8 reps each  Leg load with opposite arm lift 10 reps  each (HEP)    Bent knee fallouts 20 reps - good     Quadruped rocking with spinal flexion and hip hinge - good      SL thoracic mobility 10 reps each, scap mobility bilt PROM LTR 10 reps     Shoulder retraction green band > 15 reps,   Extension 10 reps     Rhythmic stabilization contact points pelvis/shoulders  Leg load 15 reps each, bent knee fallouts 15 reps each,     LTR pain - slight decrease following transverse plane isometrics  Knees to chest with SB 20 reps  Supine cervical PROM    Bilat UE nerve flossing - contract/relax techniques through shoulder reduced radicular symptoms      Bent knee fallouts - good, leg load - symptoms - lumbar pain provoked - pain reduced with bracing     Post pelvic tilt - pain lumbar spine  Manual therapy 15 min:   Bilat scap depression in supine, SL passive mobility scap,  T/L PAS,   Supine passive UE ROM  TRX squats 20 reps  Supine cervical PROM  in supine, right and left UE ROM/nerve flossing   Shuttle 5 bands squats 10 reps x 4      Knees to chest with SB 20 reps   Cervical isometrics 5 reps each direction  The \"stick\" bilat IT/hamstrings/ quads soft tissue mob  Cervical isometrics - no pain all planes      PROM bilat LE in supine - no symptoms - ROM WNL   Seated thoracic rotation with isometric hip adduction UE , cervcial rotation 10 reps each  Figure four stretch 3 reps x 30 seconds each          TRX squats 20 reps Leg load with blue band pulldown 10 reps x 2 each        Forward step same side bilat reach shoulder height, forward step with same and opposite overhead reach - no pain, bilat overhead reach - pain            Charges: TherEx 3       Total Timed Treatment: 50  min  Total Treatment Time: 50 min

## 2024-12-03 ENCOUNTER — OFFICE VISIT (OUTPATIENT)
Dept: PHYSICAL THERAPY | Age: 54
End: 2024-12-03
Attending: NEUROLOGICAL SURGERY
Payer: COMMERCIAL

## 2024-12-03 PROCEDURE — 97110 THERAPEUTIC EXERCISES: CPT | Performed by: PHYSICAL THERAPIST

## 2024-12-03 NOTE — PROGRESS NOTES
Dx: Lumbar radiculopathy (M54.16) , s/p cervical fusion            Authorized # of Visits:  20  Fall Risk: standard         Precautions: n/a             Subjective:   Back and neck pain.  Burning in arms continues to improve  Current Pain Ratin/10  Objective:   Initiated forward bending/hip mobility on step and in sitting to improve ability to don/doff shoes. Patient utilized long handled shoe horn to tess shoes following ankle mobility with no shoes on   Unable to perform seated figure four mobility. Initial reps of hip IR/ER on step provoked pain. Stabilizing pelvis eliminated pain  No pain with posterior pelvic tilt     Assessment:   Pain predominately lumbar spine. PROM of bilat hips WNL. Difficulty with donning/doffing shoes relates to mobility of hips in sitting and difficulty with forward bending.   Plan:   PT 2x/wk progressive exercise, body mechanics training     Date: 2024  Tx#:  Date: 2024   Tx#: 3/20 Date: 2024   Tx#:  Date: 2024   Tx#:  Date: 2024   Tx#:   Date: 12/3/2024   Tx#:  Date:   Tx#: 8/   TherEx 50 min TherEx 30 min TherEx 45 min TherEx 45 min TherEx 50 min TherEx 45 min TherEx   Treadmill 8 minutes with UE support secondary to decreased balance  Elliptical 5 minutes  Elliptical 6 minutes  Elliptical 8 minutes Elliptical 10 minutes Elliptical 8 minutes     TRX bilat shoulder flexion  SL thoracic mobility UE  10 reps each  Quadruped rocking - no pain with wide knee position  Dynamic warm up UE  transverse, frontal and sagittal plane  TRX bilat shoulder flexion 10 reps     Doorway pec ER stretch  Lateral step up/down 6 inch step 10 reps each     TRX squats 10 reps x 2  Bent knee fallouts, leg load 15 reps each  Prone MT 20 reps  TRX squats 20 reps  Posterior pelvic tilt reduces lumbar pain (HEP) Closed chain hip IR, lateral step opposite side reach 10 reps each     Ankle mobility in semi tandem and tandem stand > 10 reps each     Forward step same side bilat UE , closed chain hip IR bilat UE  10 reps each     Rhythmic stab pelvis/shoulders contact points, pelvis, shoulders      Posterior pelvic tilt 15 reps Prone shoulder exten bilat 10 reps x 2  Rolling supine to SL 8 reps each  Leg load with opposite arm lift 10 reps each (HEP)    Bent knee fallouts 20 reps - good     Quadruped rocking with spinal flexion and hip hinge - good  TRX squats 10 reps x 2     SL thoracic mobility 10 reps each, scap mobility bilt PROM LTR 10 reps     Shoulder retraction green band > 15 reps,   Extension 10 reps     Rhythmic stabilization contact points pelvis/shoulders  Leg load 15 reps each, bent knee fallouts 15 reps each,     LTR pain - slight decrease following transverse plane isometrics  Knees to chest with SB 20 reps  Supine cervical PROM    Bilat UE nerve flossing - contract/relax techniques through shoulder reduced radicular symptoms  The \"stick\" soft tissue release hip flexors/quads     Figure four stretch 4 reps x 30 second each with manual hold at pelvis     Cervical PROM in supine as tolerated     Bent knee fallouts - good, leg load - symptoms - lumbar pain provoked - pain reduced with bracing     Post pelvic tilt - pain lumbar spine  Manual therapy 15 min:   Bilat scap depression in supine, SL passive mobility scap,  T/L PAS,   Supine passive UE ROM  TRX squats 20 reps  Supine cervical PROM  in supine, right and left UE ROM/nerve flossing   Shuttle 5 bands squats 10 reps x 4  SL thoracic mobility 10 reps each          Knees to chest with SB 20 reps   Cervical isometrics 5 reps each direction  The \"stick\" bilat IT/hamstrings/ quads soft tissue mob  Cervical isometrics - no pain all planes  Hip IR/ER on step with stable pelvis - HEP - no forward bending      PROM bilat LE in supine - no symptoms - ROM WNL   Seated thoracic rotation with isometric hip adduction UE , cervcial rotation 10 reps each  Figure four stretch 3 reps x 30  seconds each          TRX squats 20 reps Leg load with blue band pulldown 10 reps x 2 each        Forward step same side bilat reach shoulder height, forward step with same and opposite overhead reach - no pain, bilat overhead reach - pain            Charges: TherEx 3       Total Timed Treatment: 45  min  Total Treatment Time: 45 min

## 2024-12-06 ENCOUNTER — OFFICE VISIT (OUTPATIENT)
Dept: PHYSICAL THERAPY | Age: 54
End: 2024-12-06
Attending: NEUROLOGICAL SURGERY
Payer: COMMERCIAL

## 2024-12-06 PROCEDURE — 97110 THERAPEUTIC EXERCISES: CPT

## 2024-12-06 NOTE — PROGRESS NOTES
Dx: Lumbar radiculopathy (M54.16) , s/p cervical fusion            Authorized # of Visits:  20  Fall Risk: standard         Precautions: n/a             Subjective: States that she continues to have the same symptoms.  States that she saw MD, who suggested she continue PT and a radiograph.  VAS 5/10.    Objective: Treatment per log below.  Significant lumbar lordosis in standing that does not resolve with support.  Cervical AROM is 25% bilateral rotation.  75% PROM supported.        Date: 11/13/2024  Tx#: 2/20 Date: 11/18/2024   Tx#: 3/20 Date: 11/20/2024   Tx#: 4/20 Date: 11/25/2024   Tx#: 5/20 Date: 11/27/2024   Tx#: 6/20  Date: 12/3/2024   Tx#: 7/20 Date:12/6/2024  Tx#: 8/   TherEx 50 min TherEx 30 min TherEx 45 min TherEx 45 min TherEx 50 min TherEx 45 min TherEx 45   Treadmill 8 minutes with UE support secondary to decreased balance  Elliptical 5 minutes  Elliptical 6 minutes  Elliptical 8 minutes Elliptical 10 minutes Elliptical 8 minutes  Elliptical 8 minutes;   Seated ball rolls x 20    TRX bilat shoulder flexion  SL thoracic mobility UE  10 reps each  Quadruped rocking - no pain with wide knee position  Dynamic warm up UE  transverse, frontal and sagittal plane  TRX bilat shoulder flexion 10 reps     Doorway pec ER stretch  Lateral step up/down 6 inch step 10 reps each     TRX squats 10 reps x 2  Bent knee fallouts, leg load 15 reps each  Prone MT 20 reps  TRX squats 20 reps  Posterior pelvic tilt reduces lumbar pain (HEP) Closed chain hip IR, lateral step opposite side reach 10 reps each     Ankle mobility in semi tandem and tandem stand > 10 reps each SL thoracic mobility 10 reps each     Forward step same side bilat UE , closed chain hip IR bilat UE  10 reps each     Rhythmic stab pelvis/shoulders contact points, pelvis, shoulders      Posterior pelvic tilt 15 reps Prone shoulder exten bilat 10 reps x 2  Rolling supine to SL 8 reps each  Leg load with opposite arm lift 10 reps  each (HEP)    Bent knee fallouts 20 reps - good     Quadruped rocking with spinal flexion and hip hinge - good  TRX squats 10 reps x 2  TRX squats 10 reps x 2; Shuttle L5 x 30 bilateral press   SL thoracic mobility 10 reps each, scap mobility bilt PROM LTR 10 reps     Shoulder retraction green band > 15 reps,   Extension 10 reps     Rhythmic stabilization contact points pelvis/shoulders  Leg load 15 reps each, bent knee fallouts 15 reps each,     LTR pain - slight decrease following transverse plane isometrics  Knees to chest with SB 20 reps  Supine cervical PROM    Bilat UE nerve flossing - contract/relax techniques through shoulder reduced radicular symptoms  The \"stick\" soft tissue release hip flexors/quads     Figure four stretch 4 reps x 30 second each with manual hold at pelvis     Cervical PROM in supine as tolerated  Seated ball rolls for lumbar flexion x 15    Bent knee fallouts - good, leg load - symptoms - lumbar pain provoked - pain reduced with bracing     Post pelvic tilt - pain lumbar spine  Manual therapy 15 min:   Bilat scap depression in supine, SL passive mobility scap,  T/L PAS,   Supine passive UE ROM  TRX squats 20 reps  Supine cervical PROM  in supine, right and left UE ROM/nerve flossing   Shuttle 5 bands squats 10 reps x 4  SL thoracic mobility 10 reps each          Knees to chest with SB 20 reps   Cervical isometrics 5 reps each direction  The \"stick\" bilat IT/hamstrings/ quads soft tissue mob  Cervical isometrics - no pain all planes  Hip IR/ER on step with stable pelvis - HEP - no forward bending      PROM bilat LE in supine - no symptoms - ROM WNL   Seated thoracic rotation with isometric hip adduction UE , cervcial rotation 10 reps each  Figure four stretch 3 reps x 30 seconds each          TRX squats 20 reps Leg load with blue band pulldown 10 reps x 2 each   Cervical facet up glide; OA posterior glide; PROM to cervical spine.       Forward step same side bilat reach shoulder  height, forward step with same and opposite overhead reach - no pain, bilat overhead reach - pain     Hip PRON, Lumbar SB manipulation L3L4 L4L5       Assessment: Responded well.  Pain appears to be muscular in nature and fatigue based.  Pain negates when spine is supported.  Would benefit from further endurance training.  Needs to increase lumbar spinal mobility.    Plan:   PT 2x/wk progressive exercise, body mechanics training     Charges: TherEx 3       Total Timed Treatment: 45  min  Total Treatment Time: 45 min

## 2024-12-10 ENCOUNTER — OFFICE VISIT (OUTPATIENT)
Dept: PHYSICAL THERAPY | Age: 54
End: 2024-12-10
Attending: NEUROLOGICAL SURGERY
Payer: COMMERCIAL

## 2024-12-10 PROCEDURE — 97110 THERAPEUTIC EXERCISES: CPT | Performed by: PHYSICAL THERAPIST

## 2024-12-10 PROCEDURE — 97140 MANUAL THERAPY 1/> REGIONS: CPT | Performed by: PHYSICAL THERAPIST

## 2024-12-10 NOTE — PROGRESS NOTES
Dx: Lumbar radiculopathy (M54.16) , s/p cervical fusion            Authorized # of Visits:  20  Fall Risk: standard         Precautions: n/a             Subjective:    Pain in back/neck and some burning in arms  Pain 5/10   Objective:    Resisted side step and gait - spinal pain. With verbal cues for decreased shifting - no pain during side stepping. No pain with backward walking   Quadruped rocking to left lower quadrant - lumbar pain   Supine right hip IR/ER - lumbar pain - no pain with arms overhead   Posterior pelvic tilt - initial reps - lumbar pain, repeated reps no pain   10 pound suitcase carry - UE symptoms, bilat carry total of 20 pounds - no pain       Date: 11/25/2024   Tx#: 5/20 Date: 11/27/2024   Tx#: 6/20  Date: 12/3/2024   Tx#: 7/20 Date:12/6/2024  Tx#: 8/ Date: 12/10/2024   Tx#: 9/20    TherEx 45 min TherEx 50 min TherEx 45 min TherEx 45 TherEx 35 min   Elliptical 8 minutes Elliptical 10 minutes Elliptical 8 minutes  Elliptical 8 minutes;   Seated ball rolls x 20  Elliptical 8 minutes    Dynamic warm up UE  transverse, frontal and sagittal plane  TRX bilat shoulder flexion 10 reps     Doorway pec ER stretch  Lateral step up/down 6 inch step 10 reps each   Quadruped rocking  no  pain in sagittal plane, rocking left lower quadrant - lumbar pain   No pain with hinging or flexion with tactile cues in pure sagittal plane   TRX squats 20 reps  Posterior pelvic tilt reduces lumbar pain (HEP) Closed chain hip IR, lateral step opposite side reach 10 reps each     Ankle mobility in semi tandem and tandem stand > 10 reps each SL thoracic mobility 10 reps each   Posterior pelvic tilt - initial reps painful lumbar spine    Rolling supine to SL 8 reps each  Leg load with opposite arm lift 10 reps each (HEP)    Bent knee fallouts 20 reps - good     Quadruped rocking with spinal flexion and hip hinge - good  TRX squats 10 reps x 2  TRX squats 10 reps x 2; Shuttle L5 x 30 bilateral press Right hip IR/ER in  supine - lumbar pain - arms overhead eliminated pain    Knees to chest with SB 20 reps  Supine cervical PROM    Bilat UE nerve flossing - contract/relax techniques through shoulder reduced radicular symptoms  The \"stick\" soft tissue release hip flexors/quads     Figure four stretch 4 reps x 30 second each with manual hold at pelvis     Cervical PROM in supine as tolerated  Seated ball rolls for lumbar flexion x 15  Medicordz resisted walk - pain,  Side step - no pain with cues for alignment   Backward walk - no pain    Supine cervical PROM  in supine, right and left UE ROM/nerve flossing   Shuttle 5 bands squats 10 reps x 4  SL thoracic mobility 10 reps each        10 pound suitcase carry - symptoms   Weeks's carry total of 20 pounds, 10 each - no pain    The \"stick\" bilat IT/hamstrings/ quads soft tissue mob  Cervical isometrics - no pain all planes  Hip IR/ER on step with stable pelvis - HEP - no forward bending    Cervical isometrics - good strength - no pain    Figure four stretch 3 reps x 30 seconds each      Forward step bilat overhead reach > 5 reps each    Leg load with blue band pulldown 10 reps x 2 each   Cervical facet up glide; OA posterior glide; PROM to cervical spine.   Manual therapy: 10 min  Suboccipital release, UT MFR       Hip PRON, Lumbar SB manipulation L3L4 L4L5        Assessment:   Pain decreased following interventions. There is decreased reaction/timing of spinal stabilizers as pain was improved with symmetrical versus symmetrical carry and with spinal hip mobility following activation via isometrics    Plan:   PT 2x/wk progressive exercise, body mechanics training     Charges: TherEx 2, manual therapy      Total Timed Treatment: 45  min  Total Treatment Time: 45 min

## 2024-12-12 ENCOUNTER — OFFICE VISIT (OUTPATIENT)
Dept: PHYSICAL THERAPY | Age: 54
End: 2024-12-12
Attending: NEUROLOGICAL SURGERY
Payer: COMMERCIAL

## 2024-12-12 PROCEDURE — 97110 THERAPEUTIC EXERCISES: CPT | Performed by: PHYSICAL THERAPIST

## 2024-12-12 PROCEDURE — 97140 MANUAL THERAPY 1/> REGIONS: CPT | Performed by: PHYSICAL THERAPIST

## 2024-12-12 NOTE — PROGRESS NOTES
Dx: Lumbar radiculopathy (M54.16) , s/p cervical fusion            Authorized # of Visits:  20  Fall Risk: standard         Precautions: n/a             Subjective:    Burning in arms was better after last visit. She did not walk yesterday. Walking tends to increase arm burning  Pain 4/10  Objective:    Treadmill walking - Increased bilat UE symptoms  Lumbar pain increased with thoracic PAS, quadruped rocking. Pelvic shift in prone and with rocking back contributes to pain as there is good reduction of pain with stabilization of lumbar spine   SL thoracic mobility - no increase in UE symptoms  Braiding -good  Squatting with TRX straps - good   Single leg balance increased lateral shift right with right LE balance, left good alignment  Reduction of bilat UE symptoms is achieved with manual distraction in standing. No increase in symptoms cervical isometrics, good strength. Arms in flexed position versus dangling in standing also reduces arm pain        Date: 11/25/2024   Tx#: 5/20 Date: 11/27/2024   Tx#: 6/20  Date: 12/3/2024   Tx#: 7/20 Date:12/6/2024  Tx#: 8/ Date: 12/10/2024   Tx#: 9/20  Date:12/12/2024   Tx#: 10/20    TherEx 45 min TherEx 50 min TherEx 45 min TherEx 45 TherEx 35 min TherEx 40 min   Elliptical 8 minutes Elliptical 10 minutes Elliptical 8 minutes  Elliptical 8 minutes;   Seated ball rolls x 20  Elliptical 8 minutes  Braiding - good   High knees - lumbar pain, no pain with arms overhead    Dynamic warm up UE  transverse, frontal and sagittal plane  TRX bilat shoulder flexion 10 reps     Doorway pec ER stretch  Lateral step up/down 6 inch step 10 reps each   Quadruped rocking  no  pain in sagittal plane, rocking left lower quadrant - lumbar pain   No pain with hinging or flexion with tactile cues in pure sagittal plane Single leg balance  right and left, increased lateral shift with balance right LE (HEP)   TRX squats 20 reps  Posterior pelvic tilt reduces lumbar pain (HEP) Closed chain hip IR,  lateral step opposite side reach 10 reps each     Ankle mobility in semi tandem and tandem stand > 10 reps each SL thoracic mobility 10 reps each   Posterior pelvic tilt - initial reps painful lumbar spine  TRX squats > 20 reps    Rolling supine to SL 8 reps each  Leg load with opposite arm lift 10 reps each (HEP)    Bent knee fallouts 20 reps - good     Quadruped rocking with spinal flexion and hip hinge - good  TRX squats 10 reps x 2  TRX squats 10 reps x 2; Shuttle L5 x 30 bilateral press Right hip IR/ER in supine - lumbar pain - arms overhead eliminated pain  Cervical isometrics - good, no pain in standing     Quadruped rocking with correction of pelvis - no lumbar pain    Knees to chest with SB 20 reps  Supine cervical PROM    Bilat UE nerve flossing - contract/relax techniques through shoulder reduced radicular symptoms  The \"stick\" soft tissue release hip flexors/quads     Figure four stretch 4 reps x 30 second each with manual hold at pelvis     Cervical PROM in supine as tolerated  Seated ball rolls for lumbar flexion x 15  Medicordz resisted walk - pain,  Side step - no pain with cues for alignment   Backward walk - no pain  SL thoracic mobility 10 reps each - no increase in UE symptoms       Review of HEP  band exercise D2 flexion, horizontal abduction    Supine cervical PROM  in supine, right and left UE ROM/nerve flossing   Shuttle 5 bands squats 10 reps x 4  SL thoracic mobility 10 reps each        10 pound suitcase carry - symptoms   Weeks's carry total of 20 pounds, 10 each - no pain  Suitcase carry - pain with 10 pounds, farmer's carry -no pain    The \"stick\" bilat IT/hamstrings/ quads soft tissue mob  Cervical isometrics - no pain all planes  Hip IR/ER on step with stable pelvis - HEP - no forward bending    Cervical isometrics - good strength - no pain  Manual therapy: 10 min, thoracic PAS - no lumbar pain with stabilization at pelvis    Figure four stretch 3 reps x 30 seconds each      Forward  step bilat overhead reach > 5 reps each     Leg load with blue band pulldown 10 reps x 2 each   Cervical facet up glide; OA posterior glide; PROM to cervical spine.   Manual therapy: 10 min  Suboccipital release, UT MFR        Hip PRON, Lumbar SB manipulation L3L4 L4L5         Assessment:   Patient complaints include bilat UE symptoms and lumbar pain. Dependent position of UE provokes bilat UE symptoms, elbows flexed eliminates pain. Decreased thoracic mobility appears to contribute to lumbar pain.. There is no soft tissue tenderness with palpation     Plan:   PT 2x/wk progressive exercise, body mechanics training     Charges: TherEx 3, manual therapy      Total Timed Treatment: 50  min  Total Treatment Time: 50 min

## 2024-12-17 ENCOUNTER — APPOINTMENT (OUTPATIENT)
Dept: PHYSICAL THERAPY | Age: 54
End: 2024-12-17
Attending: NEUROLOGICAL SURGERY
Payer: COMMERCIAL

## 2024-12-17 ENCOUNTER — OFFICE VISIT (OUTPATIENT)
Dept: PHYSICAL THERAPY | Age: 54
End: 2024-12-17
Attending: NEUROLOGICAL SURGERY
Payer: COMMERCIAL

## 2024-12-17 PROCEDURE — 97110 THERAPEUTIC EXERCISES: CPT | Performed by: PHYSICAL THERAPIST

## 2024-12-17 NOTE — PROGRESS NOTES
Dx: Lumbar radiculopathy (M54.16) , s/p cervical fusion            Authorized # of Visits:  20  Fall Risk: standard         Precautions: n/a             Subjective:    Low back pain, pain in neck and both arms. Rolling in bed has not improved with the surgery   Pain 5/10  Objective:    Manual correction of pelvic position when performing quadruped rocking and with closed chain activities reduces lumbar pain    Cervical pain is reduced with manual traction in standing     Position changes guarded      Date:12/6/2024  Tx#: 8/ Date: 12/10/2024   Tx#: 9/20  Date:12/12/2024   Tx#: 10/20  Date: 12/17/2024   Tx# 11/20    TherEx 45 TherEx 35 min TherEx 40 min TherEx 50 min    Elliptical 8 minutes;   Seated ball rolls x 20  Elliptical 8 minutes  Braiding - good   High knees - lumbar pain, no pain with arms overhead  Elliptical 8 minutes       Quadruped rocking  no  pain in sagittal plane, rocking left lower quadrant - lumbar pain   No pain with hinging or flexion with tactile cues in pure sagittal plane Single leg balance  right and left, increased lateral shift with balance right LE (HEP) Split stance thoracic mobility transverse plane - lumbar pain   Transverse plane isometrics through UE - good with pelvis squared    SL thoracic mobility 10 reps each   Posterior pelvic tilt - initial reps painful lumbar spine  TRX squats > 20 reps  Quadruped rock - manual correction for pelvic - cues for hip mobility     Quadruped UE reaches - no pain with postural correction    TRX squats 10 reps x 2; Shuttle L5 x 30 bilateral press Right hip IR/ER in supine - lumbar pain - arms overhead eliminated pain  Cervical isometrics - good, no pain in standing     Quadruped rocking with correction of pelvis - no lumbar pain  Posterior pelvic tilt - lumbar pain     Cervical flexion in supine - cervical pain     Leg load - lumbar pain - bracing - decreased pain    Seated ball rolls for lumbar flexion x 15  Medicordz resisted walk - pain,  Side step  - no pain with cues for alignment   Backward walk - no pain  SL thoracic mobility 10 reps each - no increase in UE symptoms       Review of HEP  band exercise D2 flexion, horizontal abduction  TRX squats 10 reps x 2     TRX bilat shoulder flexion > 10 reps     One arm protraction manual resisted walk - pain lumbar spine      10 pound suitcase carry - symptoms   Weeks's carry total of 20 pounds, 10 each - no pain  Suitcase carry - pain with 10 pounds, farmer's carry -no pain  Single leg balance with emphasis on equal loading on foot      Cervical isometrics - good strength - no pain  Manual therapy: 10 min, thoracic PAS - no lumbar pain with stabilization at pelvis      Forward step bilat overhead reach > 5 reps each      Cervical facet up glide; OA posterior glide; PROM to cervical spine.   Manual therapy: 10 min  Suboccipital release, UT MFR      Hip PRON, Lumbar SB manipulation L3L4 L4L5          Assessment:   Patient continues to have difficulty with basic mobility and ADL function. There is improvement in pain with postural correction at pelvis as relates to lumbar complaints. Next visit with assess lift/carry     Plan:   PT 2x/wk progressive exercise, body mechanics training     Charges: TherEx 3 Total Timed Treatment: 50  min  Total Treatment Time: 50 min

## 2024-12-19 ENCOUNTER — OFFICE VISIT (OUTPATIENT)
Dept: PHYSICAL THERAPY | Age: 54
End: 2024-12-19
Attending: NEUROLOGICAL SURGERY
Payer: COMMERCIAL

## 2024-12-19 PROCEDURE — 97110 THERAPEUTIC EXERCISES: CPT | Performed by: PHYSICAL THERAPIST

## 2024-12-19 NOTE — PROGRESS NOTES
Dx: Lumbar radiculopathy (M54.16) , s/p cervical fusion            Authorized # of Visits:  20  Fall Risk: standard         Precautions: n/a             Subjective:    Pain in low back. Reports she continues to have difficulty with donning/doffing shoes/socks   Pain 5/10  Objective:    Standing and sitting active hip flexion provoke lumbar pain. No pain with passive hip flexion sitting/standing. Patient instructed to load opposite leg prior to hip flexion - pain reduced  Supine bilateral LE PROM WNL - no pain . Bent knee fallouts - no pain, right and left hip flexion provoke pain - no pain with cues to stabilize via elbow dig in bed prior to motion   10 pound suitcase carry - lumbar pain - manual support to stabilize spine - no pain   Following visit - patient utilized long handled shoe horn to tess shoes       Date:12/6/2024  Tx#: 8/ Date: 12/10/2024   Tx#: 9/20  Date:12/12/2024   Tx#: 10/20  Date: 12/17/2024   Tx# 11/20  Date: 12/19/2024   Tx#: 12/20    TherEx 45 TherEx 35 min TherEx 40 min TherEx 50 min  TherEx 50 min   Elliptical 8 minutes;   Seated ball rolls x 20  Elliptical 8 minutes  Braiding - good   High knees - lumbar pain, no pain with arms overhead  Elliptical 8 minutes    Elliptical 8 min    Quadruped rocking  no  pain in sagittal plane, rocking left lower quadrant - lumbar pain   No pain with hinging or flexion with tactile cues in pure sagittal plane Single leg balance  right and left, increased lateral shift with balance right LE (HEP) Split stance thoracic mobility transverse plane - lumbar pain   Transverse plane isometrics through UE - good with pelvis squared  Shuttle 5 bands squats 15 reps     Shuttle 3 bands single leg squats   Left and right > 30 reps - pelvic shift at extension provokes pain at greater load    Lateral step up/down on 6 inch step - lumbar pain , tactile cues for stabilization - eliminated pain    SL thoracic mobility 10 reps each   Posterior pelvic tilt - initial reps painful  lumbar spine  TRX squats > 20 reps  Quadruped rock - manual correction for pelvic - cues for hip mobility     Quadruped UE reaches - no pain with postural correction  High knees - lumbar pain - no pain with manual hold at cervical spine     10 pound suitcase carry - lumbar pain - hold at spine - no pain    TRX squats 10 reps x 2; Shuttle L5 x 30 bilateral press Right hip IR/ER in supine - lumbar pain - arms overhead eliminated pain  Cervical isometrics - good, no pain in standing     Quadruped rocking with correction of pelvis - no lumbar pain  Posterior pelvic tilt - lumbar pain     Cervical flexion in supine - cervical pain     Leg load - lumbar pain - bracing - decreased pain  PROM bilat LE in supine - no pain full ROM    Bent knee fallouts - good     Hip flexion - pain - no pain with stabilization prior to motion    Seated ball rolls for lumbar flexion x 15  Medicordz resisted walk - pain,  Side step - no pain with cues for alignment   Backward walk - no pain  SL thoracic mobility 10 reps each - no increase in UE symptoms       Review of HEP  band exercise D2 flexion, horizontal abduction  TRX squats 10 reps x 2     TRX bilat shoulder flexion > 10 reps     One arm protraction manual resisted walk - pain lumbar spine       10 pound suitcase carry - symptoms   Weeks's carry total of 20 pounds, 10 each - no pain  Suitcase carry - pain with 10 pounds, farmer's carry -no pain  Single leg balance with emphasis on equal loading on foot       Cervical isometrics - good strength - no pain  Manual therapy: 10 min, thoracic PAS - no lumbar pain with stabilization at pelvis       Forward step bilat overhead reach > 5 reps each       Cervical facet up glide; OA posterior glide; PROM to cervical spine.   Manual therapy: 10 min  Suboccipital release, UT MFR       Hip PRON, Lumbar SB manipulation L3L4 L4L5           Assessment:   Reaction/timing of spinal stabilizers is impacting pain with LE movement associated with dressing.  Neural tension tests are negative. Lumbar complaints are reduced with stabilization of cervical spine. The difficulty with position changes in donning shoes/socks is impacted by the delay.        Plan:   Re-eval     Charges: TherEx 3 Total Timed Treatment: 50  min  Total Treatment Time: 50 min

## 2024-12-23 ENCOUNTER — OFFICE VISIT (OUTPATIENT)
Dept: PHYSICAL THERAPY | Age: 54
End: 2024-12-23
Attending: NEUROLOGICAL SURGERY
Payer: COMMERCIAL

## 2024-12-23 PROCEDURE — 97110 THERAPEUTIC EXERCISES: CPT | Performed by: PHYSICAL THERAPIST

## 2024-12-23 PROCEDURE — 97140 MANUAL THERAPY 1/> REGIONS: CPT | Performed by: PHYSICAL THERAPIST

## 2024-12-23 NOTE — PROGRESS NOTES
Dx: Lumbar radiculopathy (M54.16) , s/p cervical fusion            Authorized # of Visits:  20  Fall Risk: standard         Precautions: n/a             Subjective:    Pain in low back. Reports she continues to have difficulty with donning/doffing shoes/socks No c/o LE symptoms. Right side spinal pain > left   Pain 5/10  Objective:    Cervical AROM: rotation 50% deficit. Pain with cervical flexion - right UE. Manual distraction decreases pain   Squatting: demonstrates squatting near parallel. She is unable to squat and don/doff her shoes/socks . Rolling/transfers sitting to and from supine - guarded. Lumbar pain provoked by position change.   Palpation: No acute soft tissue pain. Lumbar pain with PAS    LE ROM WNL. There is decreased active LE ROM involving flexion.   Date:12/6/2024  Tx#: 8/ Date: 12/10/2024   Tx#: 9/20  Date:12/12/2024   Tx#: 10/20  Date: 12/17/2024   Tx# 11/20  Date: 12/19/2024   Tx#: 12/20  Date: 12/23/2024   Tx#: 13/20    TherEx 45 TherEx 35 min TherEx 40 min TherEx 50 min  TherEx 50 min TherEx 30 min   Elliptical 8 minutes;   Seated ball rolls x 20  Elliptical 8 minutes  Braiding - good   High knees - lumbar pain, no pain with arms overhead  Elliptical 8 minutes    Elliptical 8 min Elliptical 8 minutes    Quadruped rocking  no  pain in sagittal plane, rocking left lower quadrant - lumbar pain   No pain with hinging or flexion with tactile cues in pure sagittal plane Single leg balance  right and left, increased lateral shift with balance right LE (HEP) Split stance thoracic mobility transverse plane - lumbar pain   Transverse plane isometrics through UE - good with pelvis squared  Shuttle 5 bands squats 15 reps     Shuttle 3 bands single leg squats   Left and right > 30 reps - pelvic shift at extension provokes pain at greater load    Lateral step up/down on 6 inch step - lumbar pain , tactile cues for stabilization - eliminated pain  Hip mobility sagittal, frontal, transverse 10 reps each with  bilat UE     SL thoracic mobility 10 reps each   Posterior pelvic tilt - initial reps painful lumbar spine  TRX squats > 20 reps  Quadruped rock - manual correction for pelvic - cues for hip mobility     Quadruped UE reaches - no pain with postural correction  High knees - lumbar pain - no pain with manual hold at cervical spine     10 pound suitcase carry - lumbar pain - hold at spine - no pain  Prone  MT 10 reps   Prone retraction 10 reps     TRX squats 10 reps x 2; Shuttle L5 x 30 bilateral press Right hip IR/ER in supine - lumbar pain - arms overhead eliminated pain  Cervical isometrics - good, no pain in standing     Quadruped rocking with correction of pelvis - no lumbar pain  Posterior pelvic tilt - lumbar pain     Cervical flexion in supine - cervical pain     Leg load - lumbar pain - bracing - decreased pain  PROM bilat LE in supine - no pain full ROM    Bent knee fallouts - good     Hip flexion - pain - no pain with stabilization prior to motion  LE PROM WNL      Seated ball rolls for lumbar flexion x 15  Medicordz resisted walk - pain,  Side step - no pain with cues for alignment   Backward walk - no pain  SL thoracic mobility 10 reps each - no increase in UE symptoms       Review of HEP  band exercise D2 flexion, horizontal abduction  TRX squats 10 reps x 2     TRX bilat shoulder flexion > 10 reps     One arm protraction manual resisted walk - pain lumbar spine    Manual therapy 15 min:   MFR T/L fascia, T/L PAS  - painful     10 pound suitcase carry - symptoms   Eweks's carry total of 20 pounds, 10 each - no pain  Suitcase carry - pain with 10 pounds, farmer's carry -no pain  Single leg balance with emphasis on equal loading on foot        Cervical isometrics - good strength - no pain  Manual therapy: 10 min, thoracic PAS - no lumbar pain with stabilization at pelvis        Forward step bilat overhead reach > 5 reps each        Cervical facet up glide; OA posterior glide; PROM to cervical spine.    Manual therapy: 10 min  Suboccipital release, UT MFR        Hip PRON, Lumbar SB manipulation L3L4 L4L5            Assessment:   Patient continues to have a variable level of pain. Lumbar pain appears to be associated with cervical spine and decreased reaction timing of spinal stabilizers. Lumbar pain is limiting patient's ability to perform LE dressing and active hip motion. In addition, she continues to have difficulty with position changes.  Pain is predominately right side of spine. Lumbar PAS provoke lumbar pain. There are no LE complaints     Goals: (to be met in 24 visits)   Patient will be able to make position changes sit to and from supine and rolling in bed with no difficulty - progressing   Patient will be able to forward bend without provoking increased arm symptoms - progressing  Patient will be able to perform pelvic tilting without pain - Met following repeated reps   Patient will be able to read without pain - Not Met  Patient will report 75% reduction in bilat UE symptoms - progressing  No pain with hip flexion in sitting - progressing  Full right shoulder ROM without pain  - Met   Patient will be able to perform light household activities with little to no pain - Not Met     Post Neck Disability Index Score  Post Score: 50 % (12/23/2024 10:39 PM)    10 % improvement    Plan: Continue skilled Physical Therapy 2 x/week or a total of 10 visits over a 90 day period. Treatment will include: therapeutic exercise,  manual therapy            Charges: TherEx 2, manual therapy  Total Timed Treatment: 50  min  Total Treatment Time: 50 min

## 2024-12-26 ENCOUNTER — OFFICE VISIT (OUTPATIENT)
Dept: PHYSICAL THERAPY | Age: 54
End: 2024-12-26
Attending: NEUROLOGICAL SURGERY
Payer: COMMERCIAL

## 2024-12-26 PROCEDURE — 97110 THERAPEUTIC EXERCISES: CPT | Performed by: PHYSICAL THERAPIST

## 2024-12-26 NOTE — PROGRESS NOTES
Dx: Lumbar radiculopathy (M54.16) , s/p cervical fusion            Authorized # of Visits:  20  Fall Risk: standard         Precautions: n/a             Subjective:      Pain 4/10  Objective:    Instructed patient in hip flexion with wall press.   Squat with TRX cervical flexion reduces lumbar pain   Position changes guarded - provokes lumbar pain    SL - thoracic rotation right SL - increased pelvic motion.      Date: 12/17/2024   Tx# 11/20  Date: 12/19/2024   Tx#: 12/20  Date: 12/23/2024   Tx#: 13/20  Date: 12/26/2024   Tx#: 14/20    TherEx 50 min  TherEx 50 min TherEx 30 min TherEx 45 min   Elliptical 8 minutes    Elliptical 8 min Elliptical 8 minutes Elliptical 8 minutes    Split stance thoracic mobility transverse plane - lumbar pain   Transverse plane isometrics through UE - good with pelvis squared  Shuttle 5 bands squats 15 reps     Shuttle 3 bands single leg squats   Left and right > 30 reps - pelvic shift at extension provokes pain at greater load    Lateral step up/down on 6 inch step - lumbar pain , tactile cues for stabilization - eliminated pain  Hip mobility sagittal, frontal, transverse 10 reps each with bilat UE     TRX squats 20 reps     TRX squat with cervical flexion - decreased lumbar pain    TRX retraction 20 reps     Wall press with hip flexion - decreased lumbar pain.    Quadruped rock - manual correction for pelvic - cues for hip mobility     Quadruped UE reaches - no pain with postural correction  High knees - lumbar pain - no pain with manual hold at cervical spine     10 pound suitcase carry - lumbar pain - hold at spine - no pain  Prone  MT 10 reps   Prone retraction 10 reps   SL thoracic mobility 10 reps each     SL rhythmic stabilization contact points pelvis/shoulders     Pelvis manual mobility   Posterior pelvic tilt - lumbar pain     Cervical flexion in supine - cervical pain     Leg load - lumbar pain - bracing - decreased pain  PROM bilat LE in supine - no pain full  ROM    Bent knee fallouts - good     Hip flexion - pain - no pain with stabilization prior to motion  LE PROM WNL    Cervical PROM in supine - no pain with support at cervical lordosis    TRX squats 10 reps x 2     TRX bilat shoulder flexion > 10 reps     One arm protraction manual resisted walk - pain lumbar spine    Manual therapy 15 min:   MFR T/L fascia, T/L PAS  - painful  Leg load with blue band pulldown 10 reps each,  10 reps with opposite shoulder flexion    Single leg balance with emphasis on equal loading on foot                                    Assessment:   SL thoracic mobility tolerated well. Pain/difficulty with position changes is unchanged. There is decreased lumbar pain with hip flexion with stabilizer activation via shoulder flexion     Goals: (to be met in 24 visits)   Patient will be able to make position changes sit to and from supine and rolling in bed with no difficulty - progressing   Patient will be able to forward bend without provoking increased arm symptoms - progressing  Patient will be able to perform pelvic tilting without pain - Met following repeated reps   Patient will be able to read without pain - Not Met  Patient will report 75% reduction in bilat UE symptoms - progressing  No pain with hip flexion in sitting - progressing  Full right shoulder ROM without pain  - Met   Patient will be able to perform light household activities with little to no pain - Not Met     Post Neck Disability Index Score  Post Score: 50 % (12/23/2024 10:39 PM)    10 % improvement    Plan: Continue PT 2x/wk progressive exercise as tolerated             Charges: TherEx 3 Total Timed Treatment:  45 min  Total Treatment Time: 45 min

## 2024-12-30 ENCOUNTER — OFFICE VISIT (OUTPATIENT)
Dept: PHYSICAL THERAPY | Age: 54
End: 2024-12-30
Attending: NEUROLOGICAL SURGERY
Payer: COMMERCIAL

## 2024-12-30 ENCOUNTER — HOSPITAL ENCOUNTER (OUTPATIENT)
Dept: GENERAL RADIOLOGY | Age: 54
Discharge: HOME | End: 2024-12-30
Attending: NEUROLOGICAL SURGERY
Payer: COMMERCIAL

## 2024-12-30 ENCOUNTER — HOSPITAL ENCOUNTER (OUTPATIENT)
Dept: GENERAL RADIOLOGY | Facility: HOSPITAL | Age: 54
Discharge: HOME | End: 2024-12-30
Attending: NEUROLOGICAL SURGERY
Payer: COMMERCIAL

## 2024-12-30 DIAGNOSIS — Z98.1 ARTHRODESIS STATUS: ICD-10-CM

## 2024-12-30 PROCEDURE — 72040 X-RAY EXAM NECK SPINE 2-3 VW: CPT | Performed by: NEUROLOGICAL SURGERY

## 2024-12-30 PROCEDURE — 97110 THERAPEUTIC EXERCISES: CPT | Performed by: PHYSICAL THERAPIST
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
## 2025-01-02 ENCOUNTER — OFFICE VISIT (OUTPATIENT)
Dept: PHYSICAL THERAPY | Age: 55
End: 2025-01-02
Attending: NEUROLOGICAL SURGERY
Payer: COMMERCIAL

## 2025-01-02 PROCEDURE — 97110 THERAPEUTIC EXERCISES: CPT | Performed by: PHYSICAL THERAPIST

## 2025-01-02 NOTE — PROGRESS NOTES
Dx: Lumbar radiculopathy (M54.16) , s/p cervical fusion            Authorized # of Visits:  20  Fall Risk: standard         Precautions: n/a             Subjective:    Patient was able to speak with the surgeon regarding medication for relief of pain. He referred to neuro or her primary doctor. She states she did not follow up with anyone. The numbness improved on her own. She did have her neck x-ray prior to her follow up with surgeon and it looked good.   Pain 4/10 numbness in her face is gone, she is feeling much better  Objective:    One arm high pull provokes increased UE symptoms. Standing bilat pulldown - no symptoms. Rhythmic stabilization followed up with resisted exercise - decreased pain   D2 UE flexion - no pain with steady head position. Pain is provoked with cervical ROM following UE movement   Resisted ambulation - no pain     Date: 12/17/2024   Tx# 11/20  Date: 12/19/2024   Tx#: 12/20  Date: 12/23/2024   Tx#: 13/20  Date: 12/26/2024   Tx#: 14/20  Date: 12/30/2024   Tx#: 15/20  Date: 1/2/2025   Tx#: 16/20    TherEx 50 min  TherEx 50 min TherEx 30 min TherEx 45 min TherEx 50 min TherEx 45 min   Elliptical 8 minutes    Elliptical 8 min Elliptical 8 minutes Elliptical 8 minutes  UBE 6 minutes  Elliptical 8 minutes    Split stance thoracic mobility transverse plane - lumbar pain   Transverse plane isometrics through UE - good with pelvis squared  Shuttle 5 bands squats 15 reps     Shuttle 3 bands single leg squats   Left and right > 30 reps - pelvic shift at extension provokes pain at greater load    Lateral step up/down on 6 inch step - lumbar pain , tactile cues for stabilization - eliminated pain  Hip mobility sagittal, frontal, transverse 10 reps each with bilat UE     TRX squats 20 reps     TRX squat with cervical flexion - decreased lumbar pain    TRX retraction 20 reps     Wall press with hip flexion - decreased lumbar pain.  Transverse plane isometrics parallel stance - good, split stance -  facial symptoms     Cervical isometrics  - good strength - no symptoms     Quadruped - no pain with rocking including with spinal flexion    Quadruped UE reach, LE reach - good     Prone quad stretch - mild quad tightness - decreased lumbar pain with manual traction at sacrum      TRX squats 10 reps x 2, retraction > 20 reps   TRX spinal flexion stretch 10 reps     6 pound medicine ball parallel and split stance overhead lift 10 reps each     Bilat pulldown 60 pounds 10 reps x 3     D2 shoulder flexion - no UE symptoms with steady cervical position. Following movement with cervical spine - UE symptoms     Cervical isometrics,  rhythmic stabilization followed by UE motion - decreased symptoms        Quadruped rock - manual correction for pelvic - cues for hip mobility     Quadruped UE reaches - no pain with postural correction  High knees - lumbar pain - no pain with manual hold at cervical spine     10 pound suitcase carry - lumbar pain - hold at spine - no pain  Prone  MT 10 reps   Prone retraction 10 reps   SL thoracic mobility 10 reps each     SL rhythmic stabilization contact points pelvis/shoulders     Pelvis manual mobility Prone hip extension right and left LE - initially - lumbar pain, cues for glut set prior to lift - no pain      Medicordz resisted forward and side step > 8 reps each -   Posterior pelvic tilt - lumbar pain     Cervical flexion in supine - cervical pain     Leg load - lumbar pain - bracing - decreased pain  PROM bilat LE in supine - no pain full ROM    Bent knee fallouts - good     Hip flexion - pain - no pain with stabilization prior to motion  LE PROM WNL    Cervical PROM in supine - no pain with support at cervical lordosis  Supine cervical rotation with support at cervical lordosis - no pain     Supine - pillow no support at lordosis - facial symptoms     Cervical flexion sin supine - facial symptoms   Posterior pelvic tilt initial reps - mild pain - repeated reps lumbar pain resolved      Knee to chest with SB 30 reps - no pain    TRX squats 10 reps x 2     TRX bilat shoulder flexion > 10 reps     One arm protraction manual resisted walk - pain lumbar spine    Manual therapy 15 min:   MFR T/L fascia, T/L PAS  - painful  Leg load with blue band pulldown 10 reps each,  10 reps with opposite shoulder flexion   LE PROM  in supine - no pain, ROM WNL    Single leg balance with emphasis on equal loading on foot                                              Assessment:   Patient relieved that facial numbness has resolved. No improvement in ability to make position changes. She was able to tolerate spinal flexion stretch with TRX straps with no increase in symptoms. UE neural tension persists and transverse plane mobility also exacerbates pain  Goals: (to be met in 24 visits)   Patient will be able to make position changes sit to and from supine and rolling in bed with no difficulty - progressing   Patient will be able to forward bend without provoking increased arm symptoms - progressing  Patient will be able to perform pelvic tilting without pain - Met following repeated reps   Patient will be able to read without pain - Not Met  Patient will report 75% reduction in bilat UE symptoms - progressing  No pain with hip flexion in sitting - progressing  Full right shoulder ROM without pain  - Met   Patient will be able to perform light household activities with little to no pain - Not Met     Post Neck Disability Index Score  Post Score: 50 % (12/23/2024 10:39 PM)    10 % improvement    Plan: Continue PT 2x/wk progressive exercise as tolerated             Charges: TherEx 3 Total Timed Treatment:  45 min  Total Treatment Time: 45 min

## 2025-01-06 ENCOUNTER — OFFICE VISIT (OUTPATIENT)
Dept: PHYSICAL THERAPY | Age: 55
End: 2025-01-06
Attending: NEUROLOGICAL SURGERY
Payer: COMMERCIAL

## 2025-01-06 PROCEDURE — 97530 THERAPEUTIC ACTIVITIES: CPT | Performed by: PHYSICAL THERAPIST

## 2025-01-06 PROCEDURE — 97140 MANUAL THERAPY 1/> REGIONS: CPT | Performed by: PHYSICAL THERAPIST

## 2025-01-06 PROCEDURE — 97110 THERAPEUTIC EXERCISES: CPT | Performed by: PHYSICAL THERAPIST

## 2025-01-06 NOTE — PROGRESS NOTES
Dx: Lumbar radiculopathy (M54.16) , s/p cervical fusion            Authorized # of Visits:  20  Fall Risk: standard         Precautions: n/a             Subjective:    .   Pain 5/10 neck and both shoulders   Objective:    Bilat significant clavicle tenderness in supine and in standing. No pain in sitting   SL thoracic mobility - no increase in symptoms. Support at cervical lordosis continue to decrease pain  Increased active cervical rotation with bracing through UES     Date: 12/26/2024   Tx#: 14/20  Date: 12/30/2024   Tx#: 15/20  Date: 1/2/2025   Tx#: 16/20  Date: 1/6/2025   Tx#: 17/20    TherEx 45 min TherEx 50 min TherEx 45 min TherEx 20 min   Elliptical 8 minutes  UBE 6 minutes  Elliptical 8 minutes  UBE 4 minutes   TRX squats 20 reps     TRX squat with cervical flexion - decreased lumbar pain    TRX retraction 20 reps     Wall press with hip flexion - decreased lumbar pain.  Transverse plane isometrics parallel stance - good, split stance - facial symptoms     Cervical isometrics  - good strength - no symptoms     Quadruped - no pain with rocking including with spinal flexion    Quadruped UE reach, LE reach - good     Prone quad stretch - mild quad tightness - decreased lumbar pain with manual traction at sacrum      TRX squats 10 reps x 2, retraction > 20 reps   TRX spinal flexion stretch 10 reps     6 pound medicine ball parallel and split stance overhead lift 10 reps each     Bilat pulldown 60 pounds 10 reps x 3     D2 shoulder flexion - no UE symptoms with steady cervical position. Following movement with cervical spine - UE symptoms     Cervical isometrics,  rhythmic stabilization followed by UE motion - decreased symptoms      SL thoracic ROM UE      Posterior pelvic tilt - lumbar pain -no pain with passive bilat knee to chest     Cervical active rotation right and left - increased mobility - decreased pain with bracing through UES    Cervical AROM with scap depression with towel  - increased ROM     SL thoracic mobility 10 reps each     SL rhythmic stabilization contact points pelvis/shoulders     Pelvis manual mobility Prone hip extension right and left LE - initially - lumbar pain, cues for glut set prior to lift - no pain      Medicordz resisted forward and side step > 8 reps each - Manual therapy 10 min:   SL scap mobility, manual depression Ilium right and left SL, rhythmic stabilization in SL  -no pain        Cervical PROM in supine - no pain with support at cervical lordosis  Supine cervical rotation with support at cervical lordosis - no pain     Supine - pillow no support at lordosis - facial symptoms     Cervical flexion sin supine - facial symptoms   Posterior pelvic tilt initial reps - mild pain - repeated reps lumbar pain resolved     Knee to chest with SB 30 reps - no pain  Therapeutic activities: 25 min    Patient education on relaxation and postural training. Patient had tendency to default to scap elevation for relief of pain.     Leg load with blue band pulldown 10 reps each,  10 reps with opposite shoulder flexion   LE PROM  in supine - no pain, ROM WNL                                       Assessment:   Patient continues to present with variable level of pain. The clavicle pain was positional and may be associated with ongoing tendency towards scapular elevation as relates to the long term guarding. There is improved cervical ROM when stabilization is performed prior to movement.  Next visit will assess lift/carry.       Goals: (to be met in 24 visits)   Patient will be able to make position changes sit to and from supine and rolling in bed with no difficulty - progressing   Patient will be able to forward bend without provoking increased arm symptoms - progressing  Patient will be able to perform pelvic tilting without pain - Met following repeated reps   Patient will be able to read without pain - Not Met  Patient will report 75% reduction in bilat UE symptoms - progressing  No pain with  hip flexion in sitting - progressing  Full right shoulder ROM without pain  - Met   Patient will be able to perform light household activities with little to no pain - Not Met     Post Neck Disability Index Score  Post Score: 50 % (12/23/2024 10:39 PM)    10 % improvement    Plan: Continue PT 2x/wk progressive exercise as tolerated             Charges: TherEx, manual therapy, therapeutic activities 2 Total Timed Treatment:  55  min  Total Treatment Time: 55 min

## 2025-01-08 ENCOUNTER — OFFICE VISIT (OUTPATIENT)
Dept: PHYSICAL THERAPY | Age: 55
End: 2025-01-08
Attending: NEUROLOGICAL SURGERY
Payer: COMMERCIAL

## 2025-01-08 PROCEDURE — 97110 THERAPEUTIC EXERCISES: CPT | Performed by: PHYSICAL THERAPIST

## 2025-01-08 NOTE — PROGRESS NOTES
Dx: Lumbar radiculopathy (M54.16) , s/p cervical fusion            Authorized # of Visits:  20  Fall Risk: standard         Precautions: n/a            Progress Summary  Pt has attended 18 visits in Physical Therapy.    Subjective:    Patient reports she does not feel pain has improved with surgery. She has difficulty getting in/out of her car and is still unable to put her shoes and socks on without assist   Pain: 4/10 - pain in low back and burning in arms right > left.   Objective:    Gait: No deviation   Position changes: Sit to and from supine - guarded with patient holing rigid position of cervical spine. Rolling SL to and from supine - cervical pain.   Cervical AROM: right and left rotation decreased 25% - painful.  Cervical flexion decreased - minimal pain with supine head lift - cues for flexion prior to lifting head from bed.Following rhythmic stabilization contact points pelvis/shoulders - increased cervical ROM.  LTR - lumbar pain - performed with cervical rotation - opposite - no pain. Posterior pelvic tilt - lumbar pain right > left - no LE symptoms. No pain with passive bilat knee to chest.   LE ROM: Bilateral LE ROM WNL. Mild right lumbar pain with right figure four mobility:  right and left hip active flexion provoke symptoms with lumbar reversal. Demonstrates squatting with no pain.  Closed chain hip ROM - little to no lumbar pain with cues for bilateral UE .   Strength: Bilateral UE/LE strength is WNL. She is able to lift maximum of 3 pounds right and left UE overhead without provoking pain. Cervical isometrics - good strength - no pain. Single leg balance right and left LE minimum of 20 seconds each   Palpation: Mild bilat UT tenderness. When pain is present - reduction in achieved with support at cervical lordosis. Last visit, there was bilat clavicle tenderness - today - no pain. There is mild bilateral neural tension median nerve right > left     Assessment:   Pain level remains variable  from day to day.  There is good extremity strength. Pain is associated with basic ADL function as she has difficulty with position changes sit to and from supine and rolling. There is some reduction in pain following stabilization techniques including contract/relax and rhythmic stabilization prior to movement. The improvement is not maintained as pain returns. In addition to the difficulty with position changes, the patient is unable to tess/doff shoes and socks without assist or long handle shoe horn.  There is little to no pain with passive hip mobility and passive cervical motion indicating  pain appears to be associated with decreased reaction/timing of spinal stabilizers. The delay would also contribute to difficulty with position changes.      Discussed progress with patient. She would like to continue with PT. In agreement, she would benefit from additional visits.  It is unclear why there has been little change in patient's function following surgery.   Patient will discuss concerns with surgeon.      Goals: (to be met in 24 visits)   Patient will be able to make position changes sit to and from supine and rolling in bed with no difficulty - progressing   Patient will be able to forward bend without provoking increased arm symptoms - progressing  Patient will be able to perform pelvic tilting without pain - Met following repeated reps   Patient will be able to read without pain - Not Met  Patient will report 75% reduction in bilat UE symptoms - progressing  No pain with hip flexion in sitting - progressing  Full right shoulder ROM without pain  - Met   Patient will be able to perform light household activities with little to no pain - Not Met     Post Neck Disability Index Score  Post Score: 50 % (12/23/2024 10:39 PM)    10 % improvement        Plan: Continue skilled Physical Therapy 2 x/week or a total of 10 visits over a 90 day period Treatment will include: progressive exercise.         Patient/Family/Caregiver was advised of these findings, precautions, and treatment options and has agreed to actively participate in planning and for this course of care.    Thank you for your referral. If you have any questions, please contact me at Dept: [phone].    Sincerely,  Electronically signed by therapist: Dara Godfrey PT     Physician's certification required:  Yes  Please co-sign or sign and return this letter via fax as soon as possible to [phone].   I certify the need for these services furnished under this plan of treatment and while under my care.    X___________________________________________________ Date____________________    Certification From: 1/8/2025  To:4/8/2025      Date: 12/26/2024   Tx#: 14/20  Date: 12/30/2024   Tx#: 15/20  Date: 1/2/2025   Tx#: 16/20  Date: 1/6/2025   Tx#: 17/20  Date: 1/8/2025   Tx#: 18/20   TherEx 45 min TherEx 50 min TherEx 45 min TherEx 20 min TherEx 45 min   Elliptical 8 minutes  UBE 6 minutes  Elliptical 8 minutes  UBE 4 minutes Nustep total of 8 minutes combination of UE/LE, LE only, UE only  - following increased symptoms arms and lumbar spine   TRX squats 20 reps     TRX squat with cervical flexion - decreased lumbar pain    TRX retraction 20 reps     Wall press with hip flexion - decreased lumbar pain.  Transverse plane isometrics parallel stance - good, split stance - facial symptoms     Cervical isometrics  - good strength - no symptoms     Quadruped - no pain with rocking including with spinal flexion    Quadruped UE reach, LE reach - good     Prone quad stretch - mild quad tightness - decreased lumbar pain with manual traction at sacrum      TRX squats 10 reps x 2, retraction > 20 reps   TRX spinal flexion stretch 10 reps     6 pound medicine ball parallel and split stance overhead lift 10 reps each     Bilat pulldown 60 pounds 10 reps x 3     D2 shoulder flexion - no UE symptoms with steady cervical position. Following movement with cervical  spine - UE symptoms     Cervical isometrics,  rhythmic stabilization followed by UE motion - decreased symptoms      SL thoracic ROM UE      Posterior pelvic tilt - lumbar pain -no pain with passive bilat knee to chest     Cervical active rotation right and left - increased mobility - decreased pain with bracing through UES    Cervical AROM with scap depression with towel  - increased ROM  TRX squats 20 reps     Step matrix with 3 pounds right and left UE. 6 pounds right and left provokes lumbar pain     Posterior pelvic tilt painful.     Bridge decreased pain with preset of abdominals     SLR right more pain than left decreased versus leg load   Return to pelvic tilts pain reduced - right lumbar pain only       LTR with cervical rotation opposite - good. LTR without cervical ROM - pain    SL thoracic mobility 10 reps each     SL rhythmic stabilization contact points pelvis/shoulders     Pelvis manual mobility Prone hip extension right and left LE - initially - lumbar pain, cues for glut set prior to lift - no pain      Medicordz resisted forward and side step > 8 reps each - Manual therapy 10 min:   SL scap mobility, manual depression Ilium right and left SL, rhythmic stabilization in SL  -no pain      Single leg balance minimum of 20 seconds right and left LE   Cervical PROM in supine - no pain with support at cervical lordosis  Supine cervical rotation with support at cervical lordosis - no pain     Supine - pillow no support at lordosis - facial symptoms     Cervical flexion sin supine - facial symptoms   Posterior pelvic tilt initial reps - mild pain - repeated reps lumbar pain resolved     Knee to chest with SB 30 reps - no pain  Therapeutic activities: 25 min    Patient education on relaxation and postural training. Patient had tendency to default to scap elevation for relief of pain.      Leg load with blue band pulldown 10 reps each,  10 reps with opposite shoulder flexion   LE PROM  in supine - no  pain, ROM WNL                                                        Charges: TherEx 3  Total Timed Treatment:  50  min  Total Treatment Time: 50 min

## 2025-01-14 ENCOUNTER — OFFICE VISIT (OUTPATIENT)
Dept: PHYSICAL THERAPY | Age: 55
End: 2025-01-14
Attending: NEUROLOGICAL SURGERY
Payer: COMMERCIAL

## 2025-01-14 PROCEDURE — 97110 THERAPEUTIC EXERCISES: CPT | Performed by: PHYSICAL THERAPIST

## 2025-01-14 NOTE — PROGRESS NOTES
Dx: Lumbar radiculopathy (M54.16) , s/p cervical fusion            Authorized # of Visits:  30   Fall Risk: standard         Precautions: n/a             Subjective:     Patient states she had her follow up with the surgeon. The surgery is fine. He recommends she got for a consult to get a spinal stimulator   Pain: 5/10 - pain in low back and burning in arms right > left.   Objective:    Patient unable to long sit secondary to spinal pain    Lumbar and right side neck pain decreased with depression right shoulder  Quadruped rocking all planes - no pain   Scap control in SL - good.   Initial rep of posterior pelvic tilt - pain, reduced pain with repeated reps    Assessment:   Patient responded well to focus on spinal mobility in multiple planes and positions on mat table. Next visit will focus on multiple plan mobility in WB. She continues to respond well to stabilizer reaction training with rhythmic stab.     Goals: (to be met in 24 visits)   Patient will be able to make position changes sit to and from supine and rolling in bed with no difficulty - progressing   Patient will be able to forward bend without provoking increased arm symptoms - progressing  Patient will be able to perform pelvic tilting without pain - Met following repeated reps   Patient will be able to read without pain - Not Met  Patient will report 75% reduction in bilat UE symptoms - progressing  No pain with hip flexion in sitting - progressing  Full right shoulder ROM without pain  - Met   Patient will be able to perform light household activities with little to no pain - Not Met     Post Neck Disability Index Score  Post Score: 50 % (12/23/2024 10:39 PM)    10 % improvement        Plan:   Continue PT 2x/wk       Date: 12/26/2024   Tx#: 14/20  Date: 12/30/2024   Tx#: 15/20  Date: 1/2/2025   Tx#: 16/20  Date: 1/6/2025   Tx#: 17/20  Date: 1/8/2025   Tx#: 18/20 Date: 1/14/2025   Tx#: 19/30    TherEx 45 min TherEx 50 min TherEx 45 min TherEx 20 min  TherEx 45 min TherEx 45 min   Elliptical 8 minutes  UBE 6 minutes  Elliptical 8 minutes  UBE 4 minutes Nustep total of 8 minutes combination of UE/LE, LE only, UE only  - following increased symptoms arms and lumbar spine Elliptical 8 minutes    TRX squats 20 reps     TRX squat with cervical flexion - decreased lumbar pain    TRX retraction 20 reps     Wall press with hip flexion - decreased lumbar pain.  Transverse plane isometrics parallel stance - good, split stance - facial symptoms     Cervical isometrics  - good strength - no symptoms     Quadruped - no pain with rocking including with spinal flexion    Quadruped UE reach, LE reach - good     Prone quad stretch - mild quad tightness - decreased lumbar pain with manual traction at sacrum      TRX squats 10 reps x 2, retraction > 20 reps   TRX spinal flexion stretch 10 reps     6 pound medicine ball parallel and split stance overhead lift 10 reps each     Bilat pulldown 60 pounds 10 reps x 3     D2 shoulder flexion - no UE symptoms with steady cervical position. Following movement with cervical spine - UE symptoms     Cervical isometrics,  rhythmic stabilization followed by UE motion - decreased symptoms      SL thoracic ROM UE      Posterior pelvic tilt - lumbar pain -no pain with passive bilat knee to chest     Cervical active rotation right and left - increased mobility - decreased pain with bracing through UES    Cervical AROM with scap depression with towel  - increased ROM  TRX squats 20 reps     Step matrix with 3 pounds right and left UE. 6 pounds right and left provokes lumbar pain     Posterior pelvic tilt painful.     Bridge decreased pain with preset of abdominals     SLR right more pain than left decreased versus leg load   Return to pelvic tilts pain reduced - right lumbar pain only       LTR with cervical rotation opposite - good. LTR without cervical ROM - pain  Quadruped rocking pure sagittal, frontal planes, thoracic mobility with cross  stretch body stretch in quadruped, lat stretching       SL thoracic mobility 10 reps each     SL rhythmic stabilization contact points pelvis/shoulders     Pelvis manual mobility Prone hip extension right and left LE - initially - lumbar pain, cues for glut set prior to lift - no pain      Medicordz resisted forward and side step > 8 reps each - Manual therapy 10 min:   SL scap mobility, manual depression Ilium right and left SL, rhythmic stabilization in SL  -no pain      Single leg balance minimum of 20 seconds right and left LE SL scap elevation/depression manual resistance 10 reps, passive mobility, SL thoracic mobility        Cervical PROM in supine - no pain with support at cervical lordosis  Supine cervical rotation with support at cervical lordosis - no pain     Supine - pillow no support at lordosis - facial symptoms     Cervical flexion sin supine - facial symptoms   Posterior pelvic tilt initial reps - mild pain - repeated reps lumbar pain resolved     Knee to chest with SB 30 reps - no pain  Therapeutic activities: 25 min    Patient education on relaxation and postural training. Patient had tendency to default to scap elevation for relief of pain.    Supine figure four stretch 4 reps x 20 seconds each       Deep neck flexor activation 5 reps       Posterior pelvic tilt > 15 reps    Leg load with blue band pulldown 10 reps each,  10 reps with opposite shoulder flexion   LE PROM  in supine - no pain, ROM WNL    Rhythmic stabilization contact points pelvis/scap     Right scap depression with towel                                                            Charges: TherEx 3  Total Timed Treatment:  45  min  Total Treatment Time: 45 min

## 2025-01-20 ENCOUNTER — HOSPITAL ENCOUNTER (OUTPATIENT)
Dept: ULTRASOUND IMAGING | Age: 55
Discharge: HOME | End: 2025-01-20
Attending: INTERNAL MEDICINE
Payer: COMMERCIAL

## 2025-01-20 DIAGNOSIS — K76.0 NAFLD (NONALCOHOLIC FATTY LIVER DISEASE): ICD-10-CM

## 2025-01-20 PROCEDURE — 76700 US EXAM ABDOM COMPLETE: CPT | Performed by: INTERNAL MEDICINE

## 2025-01-20 PROCEDURE — 76981 USE PARENCHYMA: CPT | Performed by: INTERNAL MEDICINE

## 2025-01-21 ENCOUNTER — OFFICE VISIT (OUTPATIENT)
Dept: PHYSICAL THERAPY | Age: 55
End: 2025-01-21
Attending: NEUROLOGICAL SURGERY
Payer: COMMERCIAL

## 2025-01-21 PROCEDURE — 97110 THERAPEUTIC EXERCISES: CPT | Performed by: PHYSICAL THERAPIST

## 2025-01-21 NOTE — PROGRESS NOTES
Dx: Lumbar radiculopathy (M54.16) , s/p cervical fusion            Authorized # of Visits:  30   Fall Risk: standard         Precautions: n/a             Subjective:      Pain: 4/10  Objective:    Treatment session focus on mobility in multiple planes and ROM for function for donning/doffing shoes.  Patient unable to tess shoes without long handle shoe horn     Assessment:   ROM and pain improved following interventions. Patient still unable to tess shoes without the long handle shoe horn despite the improvement.      Goals: (to be met in 24 visits)   Patient will be able to make position changes sit to and from supine and rolling in bed with no difficulty - progressing   Patient will be able to forward bend without provoking increased arm symptoms - progressing  Patient will be able to perform pelvic tilting without pain - Met following repeated reps   Patient will be able to read without pain - Not Met  Patient will report 75% reduction in bilat UE symptoms - progressing  No pain with hip flexion in sitting - progressing  Full right shoulder ROM without pain  - Met   Patient will be able to perform light household activities with little to no pain - Not Met     Post Neck Disability Index Score  Post Score: 50 % (12/23/2024 10:39 PM)    10 % improvement        Plan:   Continue PT 2x/wk       Date: 1/6/2025   Tx#: 17/20  Date: 1/8/2025   Tx#: 18/20 Date: 1/14/2025   Tx#: 19/30  Date: 1/21/2025   Tx#: 20/30    TherEx 20 min TherEx 45 min TherEx 45 min TherEx 50 min   UBE 4 minutes Nustep total of 8 minutes combination of UE/LE, LE only, UE only  - following increased symptoms arms and lumbar spine Elliptical 8 minutes  Elliptical 8 minutes    SL thoracic ROM UE      Posterior pelvic tilt - lumbar pain -no pain with passive bilat knee to chest     Cervical active rotation right and left - increased mobility - decreased pain with bracing through UES    Cervical AROM with scap depression with towel  - increased  ROM  TRX squats 20 reps     Step matrix with 3 pounds right and left UE. 6 pounds right and left provokes lumbar pain     Posterior pelvic tilt painful.     Bridge decreased pain with preset of abdominals     SLR right more pain than left decreased versus leg load   Return to pelvic tilts pain reduced - right lumbar pain only       LTR with cervical rotation opposite - good. LTR without cervical ROM - pain  Quadruped rocking pure sagittal, frontal planes, thoracic mobility with cross stretch body stretch in quadruped, lat stretching     Closed chain  hip mobility bilat UE  all planes  Squaring pelvis with with ant steps reduced pain and improved FB and pain     1/2 kneel thoracic mobility     1/2 kneel transverse plane isometrics - good     Single leg balance with and without shoes on visual cues       TRX squatting - initial reps - lumbar pain - tactile cues to increase hip drive - decreased pain   Manual therapy 10 min:   SL scap mobility, manual depression Ilium right and left SL, rhythmic stabilization in SL  -no pain      Single leg balance minimum of 20 seconds right and left LE SL scap elevation/depression manual resistance 10 reps, passive mobility, SL thoracic mobility         Therapeutic activities: 25 min    Patient education on relaxation and postural training. Patient had tendency to default to scap elevation for relief of pain.    Supine figure four stretch 4 reps x 20 seconds each       Deep neck flexor activation 5 reps       Posterior pelvic tilt > 15 reps       Rhythmic stabilization contact points pelvis/scap     Right scap depression with towel                                                   Charges: TherEx 3  Total Timed Treatment:  50  min  Total Treatment Time:  50 min

## 2025-01-27 ENCOUNTER — LABORATORY ENCOUNTER (OUTPATIENT)
Dept: LAB | Age: 55
End: 2025-01-27
Attending: INTERNAL MEDICINE
Payer: COMMERCIAL

## 2025-01-27 DIAGNOSIS — K76.0 NAFLD (NONALCOHOLIC FATTY LIVER DISEASE): Primary | ICD-10-CM

## 2025-01-27 LAB
ALBUMIN SERPL-MCNC: 4.6 G/DL (ref 3.2–4.8)
ALBUMIN/GLOB SERPL: 1.6 {RATIO} (ref 1–2)
ALP LIVER SERPL-CCNC: 98 U/L
ALT SERPL-CCNC: 30 U/L
ANION GAP SERPL CALC-SCNC: 10 MMOL/L (ref 0–18)
AST SERPL-CCNC: 26 U/L (ref ?–34)
BASOPHILS # BLD AUTO: 0.04 X10(3) UL (ref 0–0.2)
BASOPHILS NFR BLD AUTO: 0.7 %
BILIRUB SERPL-MCNC: 0.5 MG/DL (ref 0.3–1.2)
BUN BLD-MCNC: 18 MG/DL (ref 9–23)
CALCIUM BLD-MCNC: 9.5 MG/DL (ref 8.7–10.6)
CHLORIDE SERPL-SCNC: 104 MMOL/L (ref 98–112)
CO2 SERPL-SCNC: 27 MMOL/L (ref 21–32)
CREAT BLD-MCNC: 0.79 MG/DL
EGFRCR SERPLBLD CKD-EPI 2021: 89 ML/MIN/1.73M2 (ref 60–?)
EOSINOPHIL # BLD AUTO: 0.12 X10(3) UL (ref 0–0.7)
EOSINOPHIL NFR BLD AUTO: 2 %
ERYTHROCYTE [DISTWIDTH] IN BLOOD BY AUTOMATED COUNT: 11.8 %
FASTING STATUS PATIENT QL REPORTED: YES
GLOBULIN PLAS-MCNC: 2.9 G/DL (ref 2–3.5)
GLUCOSE BLD-MCNC: 86 MG/DL (ref 70–99)
HCT VFR BLD AUTO: 44.8 %
HGB BLD-MCNC: 14.8 G/DL
IMM GRANULOCYTES # BLD AUTO: 0.02 X10(3) UL (ref 0–1)
IMM GRANULOCYTES NFR BLD: 0.3 %
LYMPHOCYTES # BLD AUTO: 2.24 X10(3) UL (ref 1–4)
LYMPHOCYTES NFR BLD AUTO: 38 %
MCH RBC QN AUTO: 31.4 PG (ref 26–34)
MCHC RBC AUTO-ENTMCNC: 33 G/DL (ref 31–37)
MCV RBC AUTO: 94.9 FL
MONOCYTES # BLD AUTO: 0.51 X10(3) UL (ref 0.1–1)
MONOCYTES NFR BLD AUTO: 8.7 %
NEUTROPHILS # BLD AUTO: 2.96 X10 (3) UL (ref 1.5–7.7)
NEUTROPHILS # BLD AUTO: 2.96 X10(3) UL (ref 1.5–7.7)
NEUTROPHILS NFR BLD AUTO: 50.3 %
OSMOLALITY SERPL CALC.SUM OF ELEC: 293 MOSM/KG (ref 275–295)
PLATELET # BLD AUTO: 298 10(3)UL (ref 150–450)
POTASSIUM SERPL-SCNC: 4.4 MMOL/L (ref 3.5–5.1)
PROT SERPL-MCNC: 7.5 G/DL (ref 5.7–8.2)
RBC # BLD AUTO: 4.72 X10(6)UL
SODIUM SERPL-SCNC: 141 MMOL/L (ref 136–145)
WBC # BLD AUTO: 5.9 X10(3) UL (ref 4–11)

## 2025-01-27 PROCEDURE — 85025 COMPLETE CBC W/AUTO DIFF WBC: CPT

## 2025-01-27 PROCEDURE — 36415 COLL VENOUS BLD VENIPUNCTURE: CPT

## 2025-01-27 PROCEDURE — 80053 COMPREHEN METABOLIC PANEL: CPT

## 2025-01-28 ENCOUNTER — OFFICE VISIT (OUTPATIENT)
Dept: PHYSICAL THERAPY | Age: 55
End: 2025-01-28
Attending: NEUROLOGICAL SURGERY
Payer: COMMERCIAL

## 2025-01-28 PROCEDURE — 97110 THERAPEUTIC EXERCISES: CPT | Performed by: PHYSICAL THERAPIST

## 2025-01-28 PROCEDURE — 97140 MANUAL THERAPY 1/> REGIONS: CPT | Performed by: PHYSICAL THERAPIST

## 2025-01-28 NOTE — PROGRESS NOTES
Dx: Lumbar radiculopathy (M54.16) , s/p cervical fusion            Authorized # of Visits:  30   Fall Risk: standard         Precautions: n/a             Subjective:    Back pain and burning in both arms  Pain: 4/10  Objective:     Tolerance for spinal loading is improved with bilat carry.   Forward bending remains limited and painful at lumbar spine, tactile cues improve mobility.     Assessment:   Lumbar pain reduced to essentially 0/10 following interventions. Cervical manual traction reduces UE symptoms.     Goals: (to be met in 24 visits)   Patient will be able to make position changes sit to and from supine and rolling in bed with no difficulty - progressing   Patient will be able to forward bend without provoking increased arm symptoms - progressing  Patient will be able to perform pelvic tilting without pain - Met following repeated reps   Patient will be able to read without pain - Not Met  Patient will report 75% reduction in bilat UE symptoms - progressing  No pain with hip flexion in sitting - progressing  Full right shoulder ROM without pain  - Met   Patient will be able to perform light household activities with little to no pain - Not Met     Post Neck Disability Index Score  Post Score: 50 % (12/23/2024 10:39 PM)    10 % improvement        Plan:   Continue PT 2x/wk       Date: 1/6/2025   Tx#: 17/20  Date: 1/8/2025   Tx#: 18/20 Date: 1/14/2025   Tx#: 19/30  Date: 1/21/2025   Tx#: 20/30  Date:1/28/2025   Tx# 21/30    TherEx 20 min TherEx 45 min TherEx 45 min TherEx 50 min TherEx 35 min   UBE 4 minutes Nustep total of 8 minutes combination of UE/LE, LE only, UE only  - following increased symptoms arms and lumbar spine Elliptical 8 minutes  Elliptical 8 minutes  UBE x 7 minutes     Closed chain hip IR - lumbar pain - primarily with right LE loading   Split stance bilat overhead reach - decreased shoulder ROM increased UE symptoms    Forward bending - lumbar pain tactile cues for postural correction  decreased pain     Quadruped rock - increased UE symptoms with shoulder flexion with rocking back - mild reduction in pain with postural correction    SL thoracic ROM UE      Posterior pelvic tilt - lumbar pain -no pain with passive bilat knee to chest     Cervical active rotation right and left - increased mobility - decreased pain with bracing through UES    Cervical AROM with scap depression with towel  - increased ROM  TRX squats 20 reps     Step matrix with 3 pounds right and left UE. 6 pounds right and left provokes lumbar pain     Posterior pelvic tilt painful.     Bridge decreased pain with preset of abdominals     SLR right more pain than left decreased versus leg load   Return to pelvic tilts pain reduced - right lumbar pain only       LTR with cervical rotation opposite - good. LTR without cervical ROM - pain  Quadruped rocking pure sagittal, frontal planes, thoracic mobility with cross stretch body stretch in quadruped, lat stretching     Closed chain  hip mobility bilat UE  all planes  Squaring pelvis with with ant steps reduced pain and improved FB and pain     1/2 kneel thoracic mobility     1/2 kneel transverse plane isometrics - good     Single leg balance with and without shoes on visual cues       TRX squatting - initial reps - lumbar pain - tactile cues to increase hip drive - decreased pain 20 pound suitcase carry - increased lumbar pain. Farmer's carry 20 and 10 on opposite arm - reduces symptoms     6 pound overhead lift mild pain. Cues for drive with LE decreased pain     Squat medicine ball throw - increase pain    Manual therapy 10 min:   SL scap mobility, manual depression Ilium right and left SL, rhythmic stabilization in SL  -no pain      Single leg balance minimum of 20 seconds right and left LE SL scap elevation/depression manual resistance 10 reps, passive mobility, SL thoracic mobility       Manual therapy 15 min:  T/L MFR, - decreased lumbar pain     Cervical traction -  decreased UE symptoms    Therapeutic activities: 25 min    Patient education on relaxation and postural training. Patient had tendency to default to scap elevation for relief of pain.    Supine figure four stretch 4 reps x 20 seconds each       Deep neck flexor activation 5 reps       Posterior pelvic tilt > 15 reps        Rhythmic stabilization contact points pelvis/scap     Right scap depression with towel                                                         Charges: TherEx 2, manual therapy    Total Timed Treatment:  50  min  Total Treatment Time:  50 min

## 2025-02-04 ENCOUNTER — OFFICE VISIT (OUTPATIENT)
Dept: PHYSICAL THERAPY | Age: 55
End: 2025-02-04
Attending: NEUROLOGICAL SURGERY
Payer: COMMERCIAL

## 2025-02-04 PROCEDURE — 97110 THERAPEUTIC EXERCISES: CPT | Performed by: PHYSICAL THERAPIST

## 2025-02-04 NOTE — PROGRESS NOTES
Dx: Lumbar radiculopathy (M54.16) , s/p cervical fusion            Authorized # of Visits:  30   Fall Risk: standard         Precautions: n/a             Subjective:    Neck pain/stiffness.    Pain: 4/10  Objective:     Cervical rotation in standing decreased - painful. Following rhythmic stab, increased ROM  Supine posterior pelvic tilt - lumbar pain, support at cervical lordosis reduces pain   Position changes sit to and from supine, rolling - lumbar pain guarded and slow        Assessment:   Discussed progress with patient and overall POC. She continues to report no change in her status following cervical surgery. In agreement as she continues to struggle with basic mobility as noted with bed mobility and ability to tess/doff her shoes and socks. Support at cervical spine reduces symptoms and improves ability to make position changes.  Will discuss further during next visit     Goals: (to be met in 24 visits)   Patient will be able to make position changes sit to and from supine and rolling in bed with no difficulty - progressing   Patient will be able to forward bend without provoking increased arm symptoms - progressing  Patient will be able to perform pelvic tilting without pain - Met following repeated reps   Patient will be able to read without pain - Not Met  Patient will report 75% reduction in bilat UE symptoms - progressing  No pain with hip flexion in sitting - progressing  Full right shoulder ROM without pain  - Met   Patient will be able to perform light household activities with little to no pain - Not Met     Post Neck Disability Index Score  Post Score: 50 % (12/23/2024 10:39 PM)    10 % improvement        Plan:   As above       Date: 1/6/2025   Tx#: 17/20  Date: 1/8/2025   Tx#: 18/20 Date: 1/14/2025   Tx#: 19/30  Date: 1/21/2025   Tx#: 20/30  Date:1/28/2025   Tx# 21/30  Date:2/4/2025   Tx#: 22/30    TherEx 20 min TherEx 45 min TherEx 45 min TherEx 50 min TherEx 35 min TherEx 45 min   UBE 4 minutes  Nustep total of 8 minutes combination of UE/LE, LE only, UE only  - following increased symptoms arms and lumbar spine Elliptical 8 minutes  Elliptical 8 minutes  UBE x 7 minutes     Closed chain hip IR - lumbar pain - primarily with right LE loading   Split stance bilat overhead reach - decreased shoulder ROM increased UE symptoms    Forward bending - lumbar pain tactile cues for postural correction decreased pain     Quadruped rock - increased UE symptoms with shoulder flexion with rocking back - mild reduction in pain with postural correction  Elliptical x 8 minutes    Split stance same side reach with SB 15 reps each     Farmer's hold with 8 and 10 pounds increased cervical ROM       Rhythmic stabilization improved cervical AROM following intervention     Quadruped rocking symptoms reduced with tactile cues for pelvic alignment     Quadruped UE reaches - painful    Quadruped LE reaches - OK      SL thoracic ROM UE      Posterior pelvic tilt - lumbar pain -no pain with passive bilat knee to chest     Cervical active rotation right and left - increased mobility - decreased pain with bracing through UES    Cervical AROM with scap depression with towel  - increased ROM  TRX squats 20 reps     Step matrix with 3 pounds right and left UE. 6 pounds right and left provokes lumbar pain     Posterior pelvic tilt painful.     Bridge decreased pain with preset of abdominals     SLR right more pain than left decreased versus leg load   Return to pelvic tilts pain reduced - right lumbar pain only       LTR with cervical rotation opposite - good. LTR without cervical ROM - pain  Quadruped rocking pure sagittal, frontal planes, thoracic mobility with cross stretch body stretch in quadruped, lat stretching     Closed chain  hip mobility bilat UE  all planes  Squaring pelvis with with ant steps reduced pain and improved FB and pain     1/2 kneel thoracic mobility     1/2 kneel transverse plane isometrics - good      Single leg balance with and without shoes on visual cues       TRX squatting - initial reps - lumbar pain - tactile cues to increase hip drive - decreased pain 20 pound suitcase carry - increased lumbar pain. Farmer's carry 20 and 10 on opposite arm - reduces symptoms     6 pound overhead lift mild pain. Cues for drive with LE decreased pain     Squat medicine ball throw - increase pain  Posterior pelvic tilt - lumbar pain - support at lordosis -no pain     Leg load/SLR - lumbar pain, no pain with support at lordosis     LTR - no pain with support at lordosis    Manual therapy 10 min:   SL scap mobility, manual depression Ilium right and left SL, rhythmic stabilization in SL  -no pain      Single leg balance minimum of 20 seconds right and left LE SL scap elevation/depression manual resistance 10 reps, passive mobility, SL thoracic mobility       Manual therapy 15 min:  T/L MFR, - decreased lumbar pain     Cervical traction - decreased UE symptoms  Cervical PROM in supine        Therapeutic activities: 25 min    Patient education on relaxation and postural training. Patient had tendency to default to scap elevation for relief of pain.    Supine figure four stretch 4 reps x 20 seconds each       Deep neck flexor activation 5 reps       Posterior pelvic tilt > 15 reps         Rhythmic stabilization contact points pelvis/scap     Right scap depression with towel                                                               Charges: TherEx 3   Total Timed Treatment:  45 min  Total Treatment Time:  45 min

## 2025-02-13 ENCOUNTER — TELEPHONE (OUTPATIENT)
Dept: INTERNAL MEDICINE CLINIC | Facility: CLINIC | Age: 55
End: 2025-02-13

## 2025-02-27 ENCOUNTER — OFFICE VISIT (OUTPATIENT)
Dept: INTERNAL MEDICINE CLINIC | Facility: CLINIC | Age: 55
End: 2025-02-27
Payer: COMMERCIAL

## 2025-02-27 VITALS
BODY MASS INDEX: 35.2 KG/M2 | OXYGEN SATURATION: 98 % | HEART RATE: 83 BPM | HEIGHT: 66 IN | SYSTOLIC BLOOD PRESSURE: 120 MMHG | RESPIRATION RATE: 18 BRPM | WEIGHT: 219 LBS | DIASTOLIC BLOOD PRESSURE: 86 MMHG | TEMPERATURE: 98 F

## 2025-02-27 DIAGNOSIS — M54.2 NECK PAIN: ICD-10-CM

## 2025-02-27 DIAGNOSIS — M54.16 LUMBAR RADICULOPATHY: ICD-10-CM

## 2025-02-27 DIAGNOSIS — Z98.1 HISTORY OF FUSION OF CERVICAL SPINE: ICD-10-CM

## 2025-02-27 DIAGNOSIS — S14.105D SPINAL CORD INJURY AT C5-C7 LEVEL WITHOUT INJURY OF SPINAL BONE, SUBSEQUENT ENCOUNTER (HCC): ICD-10-CM

## 2025-02-27 DIAGNOSIS — E78.5 DYSLIPIDEMIA: ICD-10-CM

## 2025-02-27 DIAGNOSIS — G89.21 CHRONIC PAIN DUE TO TRAUMA: Primary | ICD-10-CM

## 2025-02-27 DIAGNOSIS — M54.12 CERVICAL RADICULOPATHY: ICD-10-CM

## 2025-02-27 DIAGNOSIS — K76.0 METABOLIC DYSFUNCTION-ASSOCIATED STEATOTIC LIVER DISEASE (MASLD): ICD-10-CM

## 2025-02-27 DIAGNOSIS — F32.A MILD DEPRESSION: ICD-10-CM

## 2025-02-27 PROBLEM — S14.105A SPINAL CORD INJURY AT C5-C7 LEVEL WITHOUT INJURY OF SPINAL BONE (HCC): Status: ACTIVE | Noted: 2025-02-27

## 2025-02-27 PROCEDURE — 99214 OFFICE O/P EST MOD 30 MIN: CPT | Performed by: NURSE PRACTITIONER

## 2025-02-27 PROCEDURE — 3008F BODY MASS INDEX DOCD: CPT | Performed by: NURSE PRACTITIONER

## 2025-02-27 PROCEDURE — 3074F SYST BP LT 130 MM HG: CPT | Performed by: NURSE PRACTITIONER

## 2025-02-27 PROCEDURE — 3079F DIAST BP 80-89 MM HG: CPT | Performed by: NURSE PRACTITIONER

## 2025-02-27 PROCEDURE — G2211 COMPLEX E/M VISIT ADD ON: HCPCS | Performed by: NURSE PRACTITIONER

## 2025-02-27 RX ORDER — IBUPROFEN 600 MG/1
600 TABLET, FILM COATED ORAL AS NEEDED
COMMUNITY
Start: 2024-10-01

## 2025-02-27 RX ORDER — DULOXETIN HYDROCHLORIDE 30 MG/1
30 CAPSULE, DELAYED RELEASE ORAL DAILY
Qty: 90 CAPSULE | Refills: 1 | Status: SHIPPED | OUTPATIENT
Start: 2025-02-27
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
## 2025-03-10 NOTE — TELEPHONE ENCOUNTER
Located long term disability Medical record request documented below in originals. Valid outside authorization with forms signed on 1/23/25. Never received email. Created email and archived. Letter requesting all office notes from provider Dr Adam Schriedel from 9/12/24 through present. Patient not seen with provider during the time period. Faxed back to [place] with note stating patient was not seen during time requested.

## 2025-06-25 ENCOUNTER — TELEPHONE (OUTPATIENT)
Dept: INTERNAL MEDICINE CLINIC | Facility: CLINIC | Age: 55
End: 2025-06-25

## 2025-06-25 NOTE — TELEPHONE ENCOUNTER
Received medical records, from [facility]     [facility]

## (undated) DIAGNOSIS — E66.9 OBESITY WITH BODY MASS INDEX (BMI) OF 30.0 TO 39.9: ICD-10-CM

## (undated) DIAGNOSIS — Z51.81 ENCOUNTER FOR THERAPEUTIC DRUG MONITORING: ICD-10-CM

## (undated) DEVICE — VIOLET BRAIDED (POLYGLACTIN 910), SYNTHETIC ABSORBABLE SUTURE: Brand: COATED VICRYL

## (undated) DEVICE — UNDYED BRAIDED (POLYGLACTIN 910), SYNTHETIC ABSORBABLE SUTURE: Brand: COATED VICRYL

## (undated) DEVICE — 3M(TM) MICROPORE TAPE DISPENSER 1535-2: Brand: 3M™ MICROPORE™

## (undated) DEVICE — LIGHT HANDLE

## (undated) DEVICE — SOLUTION  .9 1000ML BTL

## (undated) DEVICE — SUT SILK 0 FSL 678G

## (undated) DEVICE — STERILE POLYISOPRENE POWDER-FREE SURGICAL GLOVES: Brand: PROTEXIS

## (undated) DEVICE — SLEEVE KENDALL SCD EXPRESS MED

## (undated) DEVICE — BREAST-HERNIA-PORT CDS-LF: Brand: MEDLINE INDUSTRIES, INC.

## (undated) NOTE — LETTER
05/05/20        [address]  [address] [facility] [id number]      Dear Sahyy Mendez,    [address] [facility] records indicate that you have outstanding lab work and or testing that was ordered for you and has not yet been completed Due to the current COVID-19 outbr

## (undated) NOTE — LETTER
Asad Chilel, :8/15/1970    CONSENT FOR PROCEDURE/SEDATION    1. I authorize the performance upon [employer]  the following: IUD Removal    2.  I authorize Dr. Kesha Parsons DO (and whomever is designated as the doctor’s assistant), to [employer] Witness: _________________________________________ Date:___________     Physician Signature: _______________________________ Date:___________

## (undated) NOTE — LETTER
Patient Name: Asad Chilel  YOB: 1970          MRN :  [id number]  Date:  1/18/2024  Referring Physician:  No ref. provider found    Progress Summary  Pt has attended 22 visits in Physical Therapy.    Subjective:    Pain neck, upper back and right lower back. There is burning in both forearms. Left arm more burning.   Pain is increased when bending to put on shoes   Pain 4/10  Objective:  Gait: No deviation. High knees gait - lumbar pain right > left   Spinal ROM: slight right lateral shift. Decreased active cervical rotation right and left 50%, SB decreased 50%, extension WNL, flexion WNL. Bilat UE forearm \"burning \" complaints with spinal forward bending/simulation of donning shoes forward bending lumbar spine WNL. PROM cervical spine WNL. Manual traction/lower cervical PAS reduced bilat UE complaints. Thoracic PAS - mid cervical spine - painful No acute soft tissue tenderness   Position changes: No increase in UE symptoms. Patient demonstrated rolling, LTR, sit to and from supine without provoking symptoms.   Quadruped: Rock with hip hinge - no symptoms. Rocking with cervical flexion - bilat UE symptoms   Strength: Bilateral UE/LE gross strength 5/5. Lumbar pain with active SLR right and left LE   ROM: mild deficit right figure four mobility - lumbar pain, mild lateal hip pain . Squats with no increase in lumbar pain       Assessment:   Patient returns to therapy for first visit since 12/8/2023. Primary complaints of bilateral forearm \"burning\" remain. Lumbar pain may be associated with C/T spine. She demonstrated position changes of sit to and from supine with greater ease than at previous visits. In addition, there was no guarding/increase in pain with rolling supine to and from SL. Forward bending provoked increased bilat UE symptoms. As at previous visits, symptoms were reduced with standing manual traction. For the first time since onset of therapy, there was reduction in symptoms with manual  traction in supine. Physician has prescribed a  home traction unit if traction is beneficial. Will proceed with at least 3 additional sessions performing manual traction  to ensure that traction is helpful.     Revised Goals:  Patient will be able to forward bend/put on shoes without provoking increased UE symptoms   Patient will be able to lift/carry minimum of 20 pounds without provoking pain  Patient will be able to perform right and left hip mobility without provoking pain           Plan:    Continue PT 2x/wk manual therapy, neuro re-ed, therapeutic exercise     Patient/Family/Caregiver was advised of these findings, precautions, and treatment options and has agreed to actively participate in planning and for this course of care.    Thank you for your referral. If you have any questions, please contact me at Dept: [phone].    Sincerely,  Electronically signed by therapist: Dara Godfrey PT     Physician's certification required:  Yes  Please co-sign or sign and return this letter via fax as soon as possible to [phone].   I certify the need for these services furnished under this plan of treatment and while under my care.    X___________________________________________________ Date____________________    Certification From: 12/8/2023  To:3/7/2024            21st Century Cures Act Notice to Patient: Medical documents like this are made available to patients in the interest of transparency. However, be advised this is a medical document and it is intended as [id number] communication between your medical providers. This medical document may contain abbreviations, assessments, medical data, and results or other terms that are unfamiliar. Medical documents are intended to carry relevant information, facts as evident, and the clinical opinion of the practitioner. As such, this medical document may be written in language that appears blunt or direct. You are encouraged to contact your medical provider and/or  [facility] Patient Experience if you have any questions about this medical document.

## (undated) NOTE — LETTER
04/19/21        Rahul Notice   [address] [facility] [address]      Dear Genny Duval,    [address] [facility] records indicate that you have outstanding lab work and or testing that was ordered for you and has not yet been completed:  Orders Placed This Encounter

## (undated) NOTE — Clinical Note
I had the pleasure of seeing Bella Martinez on 12/21/2022. Please see my attached note.   Danielle Richardson MD FACS [facility]Surgery

## (undated) NOTE — LETTER
Patient Name: Do Greene  YOB: 1970          MRN :  [id number]  Date:  12/8/2023  Referring Physician:  Latasha Chakraborty    Progress Summary  Pt has attended 21 visits in Physical Therapy. Subjective:    Patient reports she continues to have variable levels of pain from day to day. She was walking the other day when she had severe right side LBP. She was able to decrease her pain with getting in the crawling position and doing he rocking exercise. She is experiencing urinary urgency that seems to be getting a little bit worse. Burning in both arms and right side LBP   Pain 5/10  Objective:  Cervical posture: slight left side bend. Correction of side bend provokes bilat UE symptoms and right lumbar pain. Manual cervical distraction in standing - decreased symptoms. Cervical flexion: biltat UE symptoms, right lumbar pain. Cervical extension reduction in symptoms. Active cervical rotation decreased right and left - symptoms provoked. Passive rotation improves ROM when performed in standing. In supine - rotation without support at cervical lordosis provokes symptoms. Bilateral UE/LEstrength/ROM WNL. Right Apley's IR - mild pain. Cervical support eliminates pain    Position changes sit to and from supine and rolling - bilat UE symptoms/cervical pain. Therapist assist with head hold during position changes essentially eliminates pain. There is slight deficit right figure four mobility - support of cervical lordosis improves right hip mobility and reduces pain         Assessment:   Patient returned to PT for  brief re-eval. She has not attended therapy since 11/13/2023 secondary to respiratory illness. Although the patient has some right shoulder pain as well as lumbar complaints, pain appears to be generated from the cervical spine. The most significant problem has been the ongoing difficulty with position changes. sit to and from supine and with rolling.  Since the onset of therapy, there has been no improvement in ability to perform this functional movement. There is clear reduction in pain at lumbar levels, right shoulder  as well as cervical spine when manual support is provided during position changes. She does obtain some relief with manual cervical traction, only in upright position. In supine she obtains best relief with support of cervical lordosis. Discussed progress with patient, she would like to continue with therapy. Secondary to insurance change, she requests resuming in January. She is scheduled for follow up with physician in January. Recommend she discuss the urinary changes with physician. In agreement with continuing therapy for additional 4 weeks to improve  on position changes and work to centralize symptoms. Plan:   As above     Patient/Family/Caregiver was advised of these findings, precautions, and treatment options and has agreed to actively participate in planning and for this course of care. Thank you for your referral. If you have any questions, please contact me at Dept: [phone]. Sincerely,  Electronically signed by therapist: Jackson Roberto PT     Physician's certification required:  Yes  Please co-sign or sign and return this letter via fax as soon as possible to [phone]. I certify the need for these services furnished under this plan of treatment and while under my care. X___________________________________________________ Date____________________    Certification From: 27/8/3407  To:3/7/2024            21st Century Cures Act Notice to Patient: Medical documents like this are made available to patients in the interest of transparency. However, be advised this is a medical document and it is intended as hczl-ue-gnrc communication between your medical providers. This medical document may contain abbreviations, assessments, medical data, and results or other terms that are unfamiliar.  Medical documents are intended to carry relevant information, facts as evident, and the clinical opinion of the practitioner. As such, this medical document may be written in language that appears blunt or direct. You are encouraged to contact your medical provider and/or [facility] Patient Experience if you have any questions about this medical document.

## (undated) NOTE — LETTER
Date & Time: 7/28/2022, 8:52 AM  Patient: Vamshi Muro      To Whom It May Concern:    Vamshi Muro was seen and treated in our department on 7/28/2022. She {Return to school/sport/work:[id number]}.     If you have any questions or concerns, please do not hesitate to call.        _____________________________  Physician/APC Signature

## (undated) NOTE — Clinical Note
Dr. Mustapha Morris,  Ms. Smith Record is down a total of #28 lbs! Sincerely, Sushma Lesches, GERRY    Sushmaeste Lesches, APRN, [facility] [address] [facility]  [facility] [address], [address] [facility], [place], [address]   [phone] [facility]
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
## (undated) NOTE — LETTER
Asad Chilel, :8/15/1970    CONSENT FOR PROCEDURE/SEDATION    1. I authorize the performance upon Melba Cain  the following: IUD REMOVAL    2.  I authorize Dr. Georgiana Rosado,  (and whomever is designated as the doctor’s assistant), to [employer] Witness: _________________________________________ Date:___________     Physician Signature: _______________________________ Date:___________

## (undated) NOTE — Clinical Note
I had the pleasure of seeing Taft Free on 11/14/2022. Please see my attached note.   Dayanna Moeller MD FACS [facility]--Surgery

## (undated) NOTE — LETTER
Patient Name: Asad Chilel  YOB: 1970          MRN :  [id number]  Date:  2024  Referring Physician:  No ref. provider found    Progress Summary  Pt has attended 7 visits in Physical Therapy.    Subjective:   Pain varies from day to day. There can be burning in both arms and/or right side LBP. No LE complaints    Current Pain Ratin/10  Objective:   Gait: No deviation. Right lumbar pain with cervical flexion. Position changes supine to and from SL/sitting bilateral UE symptoms and at times right side lumbar pain.   Bilateral UE/LE strength: 5/5 with exception of right SLR - 3/5 when performed without cervical lordosis support. Patient is able to squat without provoking pain. Squat with cervical flexion or bending forward to tie shoes - provokes UE complaints, right side lumbar pain : right - 70 pounds, left 60 pounds. 10 pound suitcase carry provokes bilat UE symptoms   Posture slight right cervical spine lateral shift. Supine passive cervical ROM WNL. Active cervical ROM decreased slightly. Bilateral UE symptoms provoked with cervical flexion   Bilateral UE/LE ROM WNL         Assessment:   Patient continues to have transient bilateral UE symptoms and right side lumbar pain. Pain is reduced most effectively with support at cervical lordosis. There is no neural tension when supported. Although she has good overall isometric strength and mobility, she has been unable to progress with basic daily function.  UE symptoms and right lumbar pain are provoked nearly immediately with cervical flexion and during position changes. She has excellent reduction in symptoms with cervical extension and lordosis support following the provocation of the symptoms.  She tolerates cervical traction well but based on presentation, there is a more immediate response to pain with support at cervical lordosis and support combined with extension.  The improvements are not sustained as any movement towards flexion  and at times ambulation reproduce her pain.    Patient has attempted to remain active and perform exercises over the course of an extended recovery. From a functional perspective, there should be improvement in position changes and her ability to perform basic ADLS.  Recommend further evaluation of cervical spine especially when positioned in flexion.     Patient will be able to forward bend/put on shoes without provoking increased UE symptoms - Not Met  Patient will be able to lift/carry minimum of 20 pounds without provoking pain - Not Met  Patient will be able to perform right and left hip mobility without provoking pain  - able to perform when cervical spine is stabilized with support at cervical lordosis         Plan: Continue PT. Patient to discuss POC at follow visit  with physician     Patient/Family/Caregiver was advised of these findings, precautions, and treatment options and has agreed to actively participate in planning and for this course of care.    Thank you for your referral. If you have any questions, please contact me at Dept: [phone].    Sincerely,  Electronically signed by therapist: Dara Godfrey PT     Physician's certification required:  Yes  Please co-sign or sign and return this letter via fax as soon as possible to [phone].   I certify the need for these services furnished under this plan of treatment and while under my care.    X___________________________________________________ Date____________________    Certification From: 2/8/2024  To:5/8/2024            21st Century Cures Act Notice to Patient: Medical documents like this are made available to patients in the interest of transparency. However, be advised this is a medical document and it is intended as evjm-vz-tqtn communication between your medical providers. This medical document may contain abbreviations, assessments, medical data, and results or other terms that are unfamiliar. Medical documents are intended to carry  relevant information, facts as evident, and the clinical opinion of the practitioner. As such, this medical document may be written in language that appears blunt or direct. You are encouraged to contact your medical provider and/or [facility] Patient Experience if you have any questions about this medical document.

## (undated) NOTE — LETTER
Date & Time: 7/28/2022, 8:45 AM  Patient: Vamshi Muro      To Whom It May Concern:    Vamshi Muro was seen and treated in our department on 7/28/2022. She {Return to school/sport/work:[id number]}.     If you have any questions or concerns, please do not hesitate to call.        _____________________________  Physician/APC Signature

## (undated) NOTE — LETTER
01/13/20        [address]  [address] [facility] [id number]      Dear Anders Councilman,    [facility] records indicate that you have outstanding lab work and or testing that was ordered for you and has not yet been completed:  Orders Placed This Encounter

## (undated) NOTE — Clinical Note
Patient was seen for their 3 month f/u at [facility] with a total weight loss of 15# since initial consult.

## (undated) NOTE — LETTER
Patient Name: Asad Chilel  YOB: 1970          MRN :  [id number]  Date:  1/8/2025  Referring Physician:  Jeffery Chu    Progress Summary  Pt has attended 18 visits in Physical Therapy.    Subjective:    Patient reports she does not feel pain has improved with surgery. She has difficulty getting in/out of her car and is still unable to put her shoes and socks on without assist   Pain: 4/10 - pain in low back and burning in arms right > left.   Objective:    Gait: No deviation   Position changes: Sit to and from supine - guarded with patient holing rigid position of cervical spine. Rolling SL to and from supine - cervical pain.   Cervical AROM: right and left rotation decreased 25% - painful.  Cervical flexion decreased - minimal pain with supine head lift - cues for flexion prior to lifting head from bed.Following rhythmic stabilization contact points pelvis/shoulders - increased cervical ROM.  LTR - lumbar pain - performed with cervical rotation - opposite - no pain. Posterior pelvic tilt - lumbar pain right > left - no LE symptoms. No pain with passive bilat knee to chest.   LE ROM: Bilateral LE ROM WNL. Mild right lumbar pain with right figure four mobility:  right and left hip active flexion provoke symptoms with lumbar reversal. Demonstrates squatting with no pain.  Closed chain hip ROM - little to no lumbar pain with cues for bilateral UE .   Strength: Bilateral UE/LE strength is WNL. She is able to lift maximum of 3 pounds right and left UE overhead without provoking pain. Cervical isometrics - good strength - no pain. Single leg balance right and left LE minimum of 20 seconds each   Palpation: Mild bilat UT tenderness. When pain is present - reduction in achieved with support at cervical lordosis. Last visit, there was bilat clavicle tenderness - today - no pain. There is mild bilateral neural tension median nerve right > left     Assessment:   Pain level remains variable from day  to day.  There is good extremity strength. Pain is associated with basic ADL function as she has difficulty with position changes sit to and from supine and rolling. There is some reduction in pain following stabilization techniques including contract/relax and rhythmic stabilization prior to movement. The improvement is not maintained as pain returns. In addition to the difficulty with position changes, the patient is unable to tess/doff shoes and socks without assist or long handle shoe horn.  There is little to no pain with passive hip mobility and passive cervical motion indicating  pain appears to be associated with decreased reaction/timing of spinal stabilizers. The delay would also contribute to difficulty with position changes.      Discussed progress with patient. She would like to continue with PT. In agreement, she would benefit from additional visits.  It is unclear why there has been little change in patient's function following surgery.   Patient will discuss concerns with surgeon.      Goals: (to be met in 24 visits)   Patient will be able to make position changes sit to and from supine and rolling in bed with no difficulty - progressing   Patient will be able to forward bend without provoking increased arm symptoms - progressing  Patient will be able to perform pelvic tilting without pain - Met following repeated reps   Patient will be able to read without pain - Not Met  Patient will report 75% reduction in bilat UE symptoms - progressing  No pain with hip flexion in sitting - progressing  Full right shoulder ROM without pain  - Met   Patient will be able to perform light household activities with little to no pain - Not Met     Post Neck Disability Index Score  Post Score: 50 % (12/23/2024 10:39 PM)    10 % improvement        Plan: Continue skilled Physical Therapy 2 x/week or a total of 10 visits over a 90 day period Treatment will include: progressive exercise.        Patient/Family/Caregiver  was advised of these findings, precautions, and treatment options and has agreed to actively participate in planning and for this course of care.    Thank you for your referral. If you have any questions, please contact me at Dept: [phone].    Sincerely,  Electronically signed by therapist: Dara Godfrey PT     Physician's certification required:  Yes  Please co-sign or sign and return this letter via fax as soon as possible to [phone].   I certify the need for these services furnished under this plan of treatment and while under my care.    X___________________________________________________ Date____________________    Certification From: 1/8/2025  To:4/8/2025 21st Century Cures Act Notice to Patient: Medical documents like this are made available to patients in the interest of transparency. However, be advised this is a medical document and it is intended as [id number]-xxfo communication between your medical providers. This medical document may contain abbreviations, assessments, medical data, and results or other terms that are unfamiliar. Medical documents are intended to carry relevant information, facts as evident, and the clinical opinion of the practitioner. As such, this medical document may be written in language that appears blunt or direct. You are encouraged to contact your medical provider and/or [facility] Patient Experience if you have any questions about this medical document.

## (undated) NOTE — MR AVS SNAPSHOT
After Visit Summary   10/28/2019    Kimo Delcid    MRN: [id number]           Visit Information     Date & Time  10/28/2019 12:45 PM Provider  [place] Nurse, DO Department  [address]  Dept[address]  Phone  [phone]      Your [facility] now offers Video Visits through [address] for adult and pediatric patients. Video Visits are available Monday - Friday for many common conditions such as allergies, colds, cough, fever, rash, sore throat, headache and pink eye.   The cost for a Video Vi at a hospital emergency room. Average cost  $2,300*   *Cost varies based on your insurance coverage  For more information about hours, locations or appointment options available at [address] [place] [address],  visit: [email or web address] or call [phone] [email or web address]. (1

## (undated) NOTE — Clinical Note
Patient was seen for their 1 month f/u at [facility] with a total weight loss of 9# since initial consult.

## (undated) NOTE — Clinical Note
Patient was seen for their 8 month f/u at [facility] with a total weight loss of 30# since initial consult.

## (undated) NOTE — LETTER
Patient Name: Micki Spears  YOB: 1970          MRN :  [id number]  Date:  10/3/2023  Referring Physician:  Kayla Ruiz    Progress Summary  Pt has attended 9 visits in Physical Therapy. Subjective:   Patient states that she continues to have burning in both her forearms right > left. Pain 4/10      Objective:   Gait: No deviation   Position changes: guarded/slow protective for cervical motion  Palpation: No soft tissue tenderness. Thoracic pain with PAS   Cervical traction in standing - decreased bilat UE complaints. In supine there is no decrease in symptoms with manual traction unless cervical lordosis is supported  Strength: Bilat UE strength = 5/5 with exception of bilat LT = 3-/5 - pain right shoulder > left. Right shoulder pain with ER at 90/90. : right = 70 pounds, left = 55 pounds   PROM cervical spine WNL. AROM decreased painful. Bilateral shoulder AROM - WNL   LE ROM WNL       Assessment:   Patient continues to have primary problem of difficulty with position changes and decreased AROM cervical spine. Pain at cervical and lumbar spine is predominately right sided only. She responds well to postural correction/support at cervical lordosis. There is reduction in bilat forearm complaints with manual traction in standing only. In supine, if the cervical lordosis is unsupported, bilat UE complaints. Increase. Right side shoulder pain may also be cervical related.   Her overall strength is WNL  She will benefit from additional therapy to improve position changes, postural training and exercise to improve tolerance for ADLS         Goals: (to be met in 12 visits)   No difficulty with dressing - Met   Full cervical ROM to improve ADL function - Not met  No difficulty with position changes - Not met   No difficulty ambulating minimum of 20 minutes - progressing   No difficulty lifting/carrying minimum of 20 pounds - NT     Post Oswestry Disability Index Score  Post Score: 60 % (10/2/2023 12:18 PM)    18 % improvement    Plan: Continue skilled Physical Therapy 2 x/week or a total of 8 visits over a 90 day period. Treatment will include: therapeutic exercise, neural re-ed        Patient/Family/Caregiver was advised of these findings, precautions, and treatment options and has agreed to actively participate in planning and for this course of care. Thank you for your referral. If you have any questions, please contact me at Dept: [phone]. Sincerely,  Electronically signed by therapist: Sherif Foster PT     Physician's certification required:  Yes  Please co-sign or sign and return this letter via fax as soon as possible to [phone]. I certify the need for these services furnished under this plan of treatment and while under my care. X___________________________________________________ Date____________________    Certification From: 70/5/7245  To:12/31/2023 21st Century Cures Act Notice to Patient: Medical documents like this are made available to patients in the interest of transparency. However, be advised this is a medical document and it is intended as [id number] communication between your medical providers. This medical document may contain abbreviations, assessments, medical data, and results or other terms that are unfamiliar. Medical documents are intended to carry relevant information, facts as evident, and the clinical opinion of the practitioner. As such, this medical document may be written in language that appears blunt or direct. You are encouraged to contact your medical provider and/or [facility] Experience if you have any questions about this medical document.

## (undated) NOTE — LETTER
[address] [address] [facility], [address] [place] [address]  [phone]            December 20, 2019      Naheed Kingsley [id number]      Dear Ms. Gil

## (undated) NOTE — Clinical Note
Ms. Nissa Rose is down a total of #38 lbs! Sincerely, GERRY Torres APRN, [facility] [place] [address] [facility]  [facility] [address], [address] [facility], [address] [facility], [address]  [phone] Thee Cunningham. org